# Patient Record
Sex: FEMALE | Race: WHITE | Employment: OTHER | URBAN - METROPOLITAN AREA
[De-identification: names, ages, dates, MRNs, and addresses within clinical notes are randomized per-mention and may not be internally consistent; named-entity substitution may affect disease eponyms.]

---

## 2017-01-03 ENCOUNTER — HOSPITAL ENCOUNTER (EMERGENCY)
Age: 62
Discharge: HOME | End: 2017-01-03
Attending: EMERGENCY MEDICINE
Payer: COMMERCIAL

## 2017-01-03 VITALS
SYSTOLIC BLOOD PRESSURE: 129 MMHG | WEIGHT: 253.75 LBS | RESPIRATION RATE: 16 BRPM | OXYGEN SATURATION: 100 % | DIASTOLIC BLOOD PRESSURE: 62 MMHG | HEIGHT: 66 IN | HEART RATE: 68 BPM | TEMPERATURE: 97.9 F | BODY MASS INDEX: 40.78 KG/M2

## 2017-01-03 DIAGNOSIS — M54.50 ACUTE BILATERAL LOW BACK PAIN WITHOUT SCIATICA: Primary | ICD-10-CM

## 2017-01-03 PROCEDURE — 99282 EMERGENCY DEPT VISIT SF MDM: CPT

## 2017-01-03 RX ORDER — CYCLOBENZAPRINE HCL 5 MG
10 TABLET ORAL
Qty: 15 TAB | Refills: 0 | Status: SHIPPED | OUTPATIENT
Start: 2017-01-03 | End: 2017-03-10 | Stop reason: ALTCHOICE

## 2017-01-03 RX ORDER — OXYCODONE AND ACETAMINOPHEN 5; 325 MG/1; MG/1
1 TABLET ORAL
Qty: 20 TAB | Refills: 0 | Status: SHIPPED | OUTPATIENT
Start: 2017-01-03 | End: 2017-03-10 | Stop reason: ALTCHOICE

## 2017-01-03 NOTE — ED PROVIDER NOTES
Patient is a 62 y.o. female presenting with back pain. The history is provided by the patient. Back Pain    This is a new problem. The current episode started yesterday. The problem has not changed since onset. Episode frequency: with movement only. Patient reports not work related injury. The pain is associated with no known injury (was walking and felt low back suddenly tighten up). The pain is present in the left side, right side and lower back. The quality of the pain is described as aching. The pain does not radiate. The pain is moderate. The symptoms are aggravated by bending, twisting and certain positions. Stiffness is present in the morning. Pertinent negatives include no chest pain, no fever, no numbness, no weight loss, no headaches, no abdominal pain, no abdominal swelling, no bowel incontinence, no perianal numbness, no bladder incontinence, no dysuria, no pelvic pain, no leg pain, no paresthesias, no paresis, no tingling and no weakness. Risk factors include obesity. The patient's surgical history non-contributory        Past Medical History:   Diagnosis Date    Depression     Diabetes ([facility] [address]     Gastrointestinal disorder      gerd    High cholesterol     Obese     Other ill-defined conditions(799.89)      fibromyalgia    Psychiatric disorder      depression       Past Surgical History:   Procedure Laterality Date    Hx gyn       hysterectomy    Delivery        X 2    Hx septoplasty           Family History:   Problem Relation Age of Onset    Hypertension Mother     Heart Disease Father     Cancer Brother        Social History     Social History    Marital status:      Spouse name: N/A    Number of children: N/A    Years of education: N/A     Occupational History    Not on file.      Social History Main Topics    Smoking status: Former Smoker    Smokeless tobacco: Never Used    Alcohol use No      Comment: rarely     Drug use: No    Sexual activity: Not Currently Partners: Male     Birth control/ protection: None     Other Topics Concern    Not on file     Social History Narrative         ALLERGIES: No allergy information available and No known drug allergies    Review of Systems   Constitutional: Negative. Negative for activity change, appetite change, chills, fatigue, fever, unexpected weight change and weight loss. HENT: Negative. Negative for congestion, hearing loss, rhinorrhea, sneezing and voice change. Eyes: Negative. Negative for pain and visual disturbance. Respiratory: Negative. Negative for apnea, cough, choking, chest tightness and shortness of breath. Cardiovascular: Negative. Negative for chest pain and palpitations. Gastrointestinal: Negative. Negative for abdominal distention, abdominal pain, blood in stool, bowel incontinence, diarrhea, nausea and vomiting. Genitourinary: Negative. Negative for bladder incontinence, difficulty urinating, dysuria, flank pain, frequency, pelvic pain and urgency. No discharge   Musculoskeletal: Positive for back pain. Negative for arthralgias, myalgias and neck stiffness. Skin: Negative. Negative for color change and rash. Neurological: Negative. Negative for dizziness, tingling, seizures, syncope, speech difficulty, weakness, numbness, headaches and paresthesias. Hematological: Negative for adenopathy. Psychiatric/Behavioral: Negative. Negative for agitation, behavioral problems, dysphoric mood and suicidal ideas. The patient is not nervous/anxious. Vitals:    01/03/17 1434   BP: 129/62   Pulse: 68   Resp: 16   Temp: 97.9 °F (36.6 °C)   SpO2: 100%   Weight: 115.1 kg (253 lb 12 oz)   Height: 5' 6\" (1.676 m)            Physical Exam   Constitutional: She is oriented to person, place, and time. She appears well-developed and well-nourished. No distress. HENT:   Head: Normocephalic and atraumatic. Mouth/Throat: Oropharynx is clear and moist. No oropharyngeal exudate.    Eyes: Conjunctivae and EOM are normal. Pupils are equal, round, and reactive to light. Right eye exhibits no discharge. Left eye exhibits no discharge. Neck: Normal range of motion. Neck supple. Cardiovascular: Normal rate, regular rhythm and intact distal pulses. Exam reveals no gallop and no friction rub. No murmur heard. Pulmonary/Chest: Effort normal and breath sounds normal. No respiratory distress. She has no wheezes. She has no rales. She exhibits no tenderness. Abdominal: Soft. Bowel sounds are normal. She exhibits no distension and no mass. There is no tenderness. There is no rebound and no guarding. Musculoskeletal: Normal range of motion. She exhibits tenderness. She exhibits no edema. Bilateral paraspinal muscular tenderness to palpation adjacent to lumbar spine. No swelling or erythema. N/v intact distally.  - SLR bilaterally. Lymphadenopathy:     She has no cervical adenopathy. Neurological: She is alert and oriented to person, place, and time. No cranial nerve deficit. Coordination normal.   Skin: Skin is warm and dry. No rash noted. No erythema. Psychiatric: She has a normal mood and affect. Nursing note and vitals reviewed. MDM  Number of Diagnoses or Management Options  Diagnosis management comments: Acute muscular low back pain. No indication for xrays. Will treat with conservative measures. Nsaids, muscle relaxants, heat, stretching, massage. F/u pcp in 1 week for recheck.     Risk of Complications, Morbidity, and/or Mortality  Presenting problems: low  Diagnostic procedures: low  Management options: low    Patient Progress  Patient progress: stable    ED Course       Procedures

## 2017-01-03 NOTE — ED TRIAGE NOTES
Pt ambulatory to treatment area with c/o \"lower back pain x 3 days. \"  Pt states \"sunday morning I got out of the chair and I bent over in pain. \"  Pt denies urinary symptoms, no loss of bowel or bladder. Pt states \"it feels like a spasm. \"  Pt reports taking vicodin yesterday without relief.

## 2017-01-03 NOTE — DISCHARGE INSTRUCTIONS
Back Pain: Care Instructions  Your Care Instructions    Back pain has many possible causes. It is often related to problems with muscles and ligaments of the back. It may also be related to problems with the nerves, discs, or bones of the back. Moving, lifting, standing, sitting, or sleeping in an awkward way can strain the back. Sometimes you don't notice the injury until later. Arthritis is another common cause of back pain. Although it may hurt a lot, back pain usually improves on its own within several weeks. Most people recover in 12 weeks or less. Using good home treatment and being careful not to stress your back can help you feel better sooner. Follow-up care is a key part of your treatment and safety. Be sure to make and go to all appointments, and call your doctor if you are having problems. Its also a good idea to know your test results and keep a list of the medicines you take. How can you care for yourself at home? · Sit or lie in positions that are most comfortable and reduce your pain. Try one of these positions when you lie down:  ¨ Lie on your back with your knees bent and supported by large pillows. ¨ Lie on the floor with your legs on the seat of a sofa or chair. Gregorio Left on your side with your knees and hips bent and a pillow between your legs. ¨ Lie on your stomach if it does not make pain worse. · Do not sit up in bed, and avoid soft couches and twisted positions. Bed rest can help relieve pain at first, but it delays healing. Avoid bed rest after the first day of back pain. · Change positions every 30 minutes. If you must sit for long periods of time, take breaks from sitting. Get up and walk around, or lie in a comfortable position. · Try using a heating pad on a low or medium setting for 15 to 20 minutes every 2 or 3 hours. Try a warm shower in place of one session with the heating pad. · You can also try an ice pack for 10 to 15 minutes every 2 to 3 hours.  Put a thin cloth between the ice pack and your skin. · Take pain medicines exactly as directed. ¨ If the doctor gave you a prescription medicine for pain, take it as prescribed. ¨ If you are not taking a prescription pain medicine, ask your doctor if you can take an over-the-counter medicine. · Take short walks several times a day. You can start with 5 to 10 minutes, 3 or 4 times a day, and work up to longer walks. Walk on level surfaces and avoid hills and stairs until your back is better. · Return to work and other activities as soon as you can. Continued rest without activity is usually not good for your back. · To prevent future back pain, do exercises to stretch and strengthen your back and stomach. Learn how to use good posture, safe lifting techniques, and proper body mechanics. When should you call for help? Call your doctor now or seek immediate medical care if:  · You have new or worsening numbness in your legs. · You have new or worsening weakness in your legs. (This could make it hard to stand up.)  · You lose control of your bladder or bowels. Watch closely for changes in your health, and be sure to contact your doctor if:  · Your pain gets worse. · You are not getting better after 2 weeks. Where can you learn more? Go to http://kelli-carl.info/. Enter V781 in the search box to learn more about \"Back Pain: Care Instructions. \"  Current as of: May 23, 2016  Content Version: 11.1  © 4450-9388 [employer]. Care instructions adapted under license by [employer] (which disclaims liability or warranty for this information). If you have questions about a medical condition or this instruction, always ask your healthcare professional. [address] any warranty or liability for your use of this information.

## 2017-03-10 ENCOUNTER — OFFICE VISIT (OUTPATIENT)
Dept: FAMILY MEDICINE CLINIC | Age: 62
End: 2017-03-10

## 2017-03-10 VITALS
BODY MASS INDEX: 40.43 KG/M2 | HEART RATE: 74 BPM | DIASTOLIC BLOOD PRESSURE: 71 MMHG | RESPIRATION RATE: 20 BRPM | SYSTOLIC BLOOD PRESSURE: 140 MMHG | WEIGHT: 251.6 LBS | OXYGEN SATURATION: 98 % | HEIGHT: 66 IN | TEMPERATURE: 98.3 F

## 2017-03-10 DIAGNOSIS — J04.0 LARYNGITIS: ICD-10-CM

## 2017-03-10 DIAGNOSIS — J32.9 SINUSITIS, UNSPECIFIED CHRONICITY, UNSPECIFIED LOCATION: ICD-10-CM

## 2017-03-10 DIAGNOSIS — J40 BRONCHITIS: Primary | ICD-10-CM

## 2017-03-10 RX ORDER — AMOXICILLIN AND CLAVULANATE POTASSIUM 875; 125 MG/1; MG/1
1 TABLET, FILM COATED ORAL 2 TIMES DAILY
Qty: 20 TAB | Refills: 0 | Status: SHIPPED | OUTPATIENT
Start: 2017-03-10 | End: 2017-03-20

## 2017-03-10 RX ORDER — LISDEXAMFETAMINE DIMESYLATE 60 MG/1
CAPSULE ORAL
Refills: 0 | COMMUNITY
Start: 2017-03-07 | End: 2018-06-28

## 2017-03-10 RX ORDER — LAMOTRIGINE 25 MG/1
TABLET ORAL
Refills: 0 | COMMUNITY
Start: 2017-03-06 | End: 2017-04-12

## 2017-03-10 NOTE — PROGRESS NOTES
Identified pt with two pt identifiers(name and ). Chief Complaint   Patient presents with    Sore Throat     only yesterday    Cough     coughing up green mucus        Health Maintenance Due   Topic    EYE EXAM RETINAL OR DILATED Q1     ZOSTER VACCINE AGE 60>        Wt Readings from Last 3 Encounters:   03/10/17 251 lb 9.6 oz (114.1 kg)   17 253 lb 12 oz (115.1 kg)   16 257 lb 15 oz (117 kg)     Temp Readings from Last 3 Encounters:   03/10/17 98.3 °F (36.8 °C) (Oral)   17 97.9 °F (36.6 °C)   16 98.1 °F (36.7 °C)     BP Readings from Last 3 Encounters:   03/10/17 140/71   17 129/62   16 (!) 140/115     Pulse Readings from Last 3 Encounters:   03/10/17 74   17 68   16 70         Learning Assessment:  :     Learning Assessment 2014   PRIMARY LEARNER Patient   HIGHEST LEVEL OF EDUCATION - PRIMARY LEARNER  DID NOT GRADUATE HIGH SCHOOL   BARRIERS PRIMARY LEARNER NONE   CO-LEARNER CAREGIVER No   PRIMARY LANGUAGE ENGLISH   LEARNER PREFERENCE PRIMARY OTHER (COMMENT)   ANSWERED BY patient   RELATIONSHIP SELF       Depression Screening:  :     PHQ 2 / 9, over the last two weeks 2016   Little interest or pleasure in doing things Several days   Feeling down, depressed or hopeless Several days   Total Score PHQ 2 2       Fall Risk Assessment:  :     Fall Risk Assessment, last 12 mths 2016   Able to walk? Yes   Fall in past 12 months? No       Abuse Screening:  :     Abuse Screening Questionnaire 10/10/2016 2015   Do you ever feel afraid of your partner? N N   Are you in a relationship with someone who physically or mentally threatens you? N N   Is it safe for you to go home? Y Y       Coordination of Care Questionnaire:  :     1) Have you been to an emergency room, urgent care clinic since your last visit?  yes  16 rib pain and 1/3/17 Anibal Liveang for her back   Hospitalized since your last visit? no             2) Have you seen or consulted any other health care providers outside of [address] [place] [address] since your last visit? yes  Dr Vince Arora psy (Include any pap smears or colon screenings in this section.)    3) Do you have an Advance Directive on file? no  Are you interested in receiving information about Advance Directives? no    Reviewed record in preparation for visit and have obtained necessary documentation. Medication reconciliation up to date and corrected with patient at this time.

## 2017-03-10 NOTE — PATIENT INSTRUCTIONS
Laryngitis: Care Instructions  Your Care Instructions    Laryngitis is an inflammation of the voice box (larynx) that causes your voice to become raspy or hoarse. It can be short-lived or long-lasting. Most of the time, laryngitis comes on quickly and lasts as long as 2 weeks. It is caused by overuse, irritation, or infection of the vocal cords inside the larynx. Some of the most common causes are a cold, the flu, or allergies. Loud talking, shouting, cheering, or singing also can cause laryngitis. Stomach acid that backs up into the throat also can make you lose your voice. Resting your voice and taking other steps at home can help you get your voice back. Follow-up care is a key part of your treatment and safety. Be sure to make and go to all appointments, and call your doctor if you are having problems. It's also a good idea to know your test results and keep a list of the medicines you take. How can you care for yourself at home? · Follow your doctor's directions for treating the condition that caused you to lose your voice. If your doctor prescribed antibiotics, take them as directed. Do not stop taking them just because you feel better. You need to take the full course of antibiotics. · Before you use cough and cold medicines, check the label. They may not be safe for young children or for people with certain health problems. · Try to keep stomach acid from backing up into your throat. Do not eat just before bedtime. Reduce the amount of coffee and alcohol you drink, and eat healthy foods. Taking over-the-counter acid reducers can help when these steps are not enough. In some cases, you may need prescription medicine. · Rest your voice. You do not have to stop speaking, but use your voice as little as possible. Speak softly but do not whisper; whispering can bother your larynx more than speaking softly. Avoid talking on the telephone or trying to speak loudly. · Try not to clear your throat.  This can cause more irritation of your larynx. Take an over-the-counter cough suppressant (if your doctor recommends it) if you have a dry cough that does not produce mucus. · Do not smoke or allow others to smoke around you. If you need help quitting, talk to your doctor about stop-smoking programs and medicines. These can increase your chances of quitting for good. · Use a humidifier or vaporizer to add moisture to your bedroom. Humidity helps to thin the mucus in the nasal membranes that causes stuffiness or postnasal drip. Follow the directions for cleaning the machine. · Drink plenty of fluids, enough so that your urine is light yellow or clear like water. If you have kidney, heart, or liver disease and have to limit fluids, talk with your doctor before you increase the amount of fluids you drink. · Relieve nasal stuffiness. A saline (saltwater) nasal wash may help. You can buy saline nose drops at a grocery store or drugstore. Or you can make your own by adding 1 teaspoon of salt and 1 teaspoon of baking soda to 2 cups of distilled water. If you make your own, fill a bulb syringe with the solution, insert the tip into your nostril, and squeeze gently. Lorn Hush your nose. When should you call for help? Call your doctor now or seek immediate medical care if:  · You have new or worse trouble breathing. · You have new pain, or your pain gets worse. · You have trouble swallowing. Watch closely for changes in your health, and be sure to contact your doctor if:  · Your voice does not get better after 2 weeks of care at home. Where can you learn more? Go to http://kelli-carl.info/. Enter Z272 in the search box to learn more about \"Laryngitis: Care Instructions. \"  Current as of: July 29, 2016  Content Version: 11.1  © 8210-1701 [employer]. Care instructions adapted under license by [employer] (which disclaims liability or warranty for this information).  If you have questions about a medical condition or this instruction, always ask your healthcare professional. [place] [address] any warranty or liability for your use of this information.

## 2017-03-10 NOTE — MR AVS SNAPSHOT
Visit Information Date & Time Provider Department Dept. Phone Encounter #  
 [id number]  7:35 PM Jaya Ortiz [phone] Upcoming Health Maintenance Date Due  
 EYE EXAM RETINAL OR DILATED Q1 8/17/1966 ZOSTER VACCINE AGE 60> 8/17/2016 Pneumococcal 19-64 Medium Risk (1 of 1 - PPSV23) 8/23/2021* HEMOGLOBIN A1C Q6M 4/25/2017 FOOT EXAM Q1 5/25/2017 MICROALBUMIN Q1 5/25/2017 LIPID PANEL Q1 10/25/2017 BREAST CANCER SCRN MAMMOGRAM 12/30/2017 PAP AKA CERVICAL CYTOLOGY 11/19/2018 COLONOSCOPY 1/21/2019 DTaP/Tdap/Td series (2 - Td) 9/1/2023 *Topic was postponed. The date shown is not the original due date. Allergies as of 3/10/2017  Review Complete On: 0/27/0104 By: Je Clark MD  
  
 Severity Noted Reaction Type Reactions No Allergy Information Available  09/04/2011    Other (comments)  
 unresponsive No Known Drug Allergies  09/04/2011    Unknown (comments) NKDA per pt's  Current Immunizations  Reviewed on 10/10/2016 Name Date Influenza Vaccine (Quad) 11/19/2015 11:47 AM  
 Influenza Vaccine (Quad) PF 10/10/2016, 1/28/2015 Tdap 9/1/2013  6:31 PM  
  
 Not reviewed this visit You Were Diagnosed With   
  
 Codes Comments Bronchitis    -  Primary ICD-10-CM: X44 ICD-9-CM: 524 Sinusitis, unspecified chronicity, unspecified location     ICD-10-CM: J32.9 ICD-9-CM: 473.9 Laryngitis     ICD-10-CM: J04.0 ICD-9-CM: 464.00 Vitals BP Pulse Temp Resp Height(growth percentile) Weight(growth percentile) 140/71 (BP 1 Location: Right arm, BP Patient Position: Sitting) 74 98.3 °F (36.8 °C) (Oral) 20 5' 6\" (1.676 m) 251 lb 9.6 oz (114.1 kg) SpO2 BMI OB Status Smoking Status 98% 40.61 kg/m2 Hysterectomy Former Smoker BMI and BSA Data Body Mass Index Body Surface Area  
 40.61 kg/m 2 2.31 m 2 Preferred Pharmacy Pharmacy Name Phone [facility] #[id number] - [place] [facility] [address] [phone] Your Updated Medication List  
  
   
This list is accurate as of: 3/10/17  3:09 PM.  Always use your most recent med list. amLODIPine 5 mg tablet Commonly known as:  Suzon Salle TAKE 1 TABLET BY MOUTH DAILY FOR PRINZMETAL ANGINA  
  
 amoxicillin-clavulanate 875-125 mg per tablet Commonly known as:  AUGMENTIN Take 1 Tab by mouth two (2) times a day for 10 days. doxepin 25 mg capsule Commonly known as:  SINEquan TAKE 1 CAPSULE BY MOUTH AT BEDTIME  
  
 escitalopram oxalate 20 mg tablet Commonly known as:  Lorenzo Dub TAKE 2 TABLETS BY MOUTH DAILY  
  
 lactulose 10 gram/15 mL (15 mL) Soln 15 mL twice a day. Indications: Constipation  
  
 lamoTRIgine 25 mg tablet Commonly known as: LaMICtal  
TAKE 2 TABLETS BY MOUTH EVERY NIGHT  
  
 levothyroxine 175 mcg tablet Commonly known as:  SYNTHROID Take 1 Tab by mouth Daily (before breakfast). metFORMIN 500 mg tablet Commonly known as:  GLUCOPHAGE  
TAKE 1 TABLET BY MOUTH TWICE DAILY WITH MEALS  
  
 omeprazole 20 mg capsule Commonly known as:  PRILOSEC  
TAKE 1 CAPSULE BY MOUTH TWICE DAILY pravastatin 40 mg tablet Commonly known as:  PRAVACHOL  
TAKE 1 TABLET BY MOUTH EVERY NIGHT  
  
 pregabalin 150 mg capsule Commonly known as:  Deneise Grandchild TAKE ONE CAPSULE BY MOUTH TWICE A DAY  Indications: FIBROMYALGIA  
  
 VYVANSE 60 mg capsule Generic drug:  Lisdexamfetamine TAKE ONE CAPSULE BY MOUTH EVERY DAY Prescriptions Sent to Pharmacy Refills  
 amoxicillin-clavulanate (AUGMENTIN) 875-125 mg per tablet 0 Sig: Take 1 Tab by mouth two (2) times a day for 10 days. Class: Normal  
 Pharmacy: [facility]acy #[id number] - [place] - [address] Ph #: [phone] Route: Oral  
  
Patient Instructions Laryngitis: Care Instructions Your Care Instructions Laryngitis is an inflammation of the voice box (larynx) that causes your voice to become raspy or hoarse. It can be short-lived or long-lasting. Most of the time, laryngitis comes on quickly and lasts as long as 2 weeks. It is caused by overuse, irritation, or infection of the vocal cords inside the larynx. Some of the most common causes are a cold, the flu, or allergies. Loud talking, shouting, cheering, or singing also can cause laryngitis. Stomach acid that backs up into the throat also can make you lose your voice. Resting your voice and taking other steps at home can help you get your voice back. Follow-up care is a key part of your treatment and safety. Be sure to make and go to all appointments, and call your doctor if you are having problems. It's also a good idea to know your test results and keep a list of the medicines you take. How can you care for yourself at home? · Follow your doctor's directions for treating the condition that caused you to lose your voice. If your doctor prescribed antibiotics, take them as directed. Do not stop taking them just because you feel better. You need to take the full course of antibiotics. · Before you use cough and cold medicines, check the label. They may not be safe for young children or for people with certain health problems. · Try to keep stomach acid from backing up into your throat. Do not eat just before bedtime. Reduce the amount of coffee and alcohol you drink, and eat healthy foods. Taking over-the-counter acid reducers can help when these steps are not enough. In some cases, you may need prescription medicine. · Rest your voice. You do not have to stop speaking, but use your voice as little as possible. Speak softly but do not whisper; whispering can bother your larynx more than speaking softly. Avoid talking on the telephone or trying to speak loudly. · Try not to clear your throat.  This can cause more irritation of your larynx. Take an over-the-counter cough suppressant (if your doctor recommends it) if you have a dry cough that does not produce mucus. · Do not smoke or allow others to smoke around you. If you need help quitting, talk to your doctor about stop-smoking programs and medicines. These can increase your chances of quitting for good. · Use a humidifier or vaporizer to add moisture to your bedroom. Humidity helps to thin the mucus in the nasal membranes that causes stuffiness or postnasal drip. Follow the directions for cleaning the machine. · Drink plenty of fluids, enough so that your urine is light yellow or clear like water. If you have kidney, heart, or liver disease and have to limit fluids, talk with your doctor before you increase the amount of fluids you drink. · Relieve nasal stuffiness. A saline (saltwater) nasal wash may help. You can buy saline nose drops at a grocery store or drugstore. Or you can make your own by adding 1 teaspoon of salt and 1 teaspoon of baking soda to 2 cups of distilled water. If you make your own, fill a bulb syringe with the solution, insert the tip into your nostril, and squeeze gently. Avila Bees your nose. When should you call for help? Call your doctor now or seek immediate medical care if: 
· You have new or worse trouble breathing. · You have new pain, or your pain gets worse. · You have trouble swallowing. Watch closely for changes in your health, and be sure to contact your doctor if: 
· Your voice does not get better after 2 weeks of care at home. Where can you learn more? Go to http://kelli-carl.info/. Enter C932 in the search box to learn more about \"Laryngitis: Care Instructions. \" Current as of: July 29, 2016 Content Version: 11.1 © 3423-4538 [employer]. Care instructions adapted under license by [employer] (which disclaims liability or warranty for this information).  If you have questions about a medical condition or this instruction, always ask your healthcare professional. [place] [address] any warranty or liability for your use of this information. Introducing [facility]! [employer] introduces Zillabyte patient portal. Now you can access parts of your medical record, email your doctor's office, and request medication refills online. 1. In your internet browser, go to [email or web address] 2. Click on the First Time User? Click Here link in the Sign In box. You will see the New Member Sign Up page. 3. Enter your Zillabyte Access Code exactly as it appears below. You will not need to use this code after youve completed the sign-up process. If you do not sign up before the expiration date, you must request a new code. · Zillabyte Access Code: [email or web address] Expires: 6/8/2017  3:09 PM 
 
4. Enter the last four digits of your Social Security Number (xxxx) and Date of Birth (mm/dd/yyyy) as indicated and click Submit. You will be taken to the next sign-up page. 5. Create a Zillabyte ID. This will be your Zillabyte login ID and cannot be changed, so think of one that is secure and easy to remember. 6. Create a Zillabyte password. You can change your password at any time. 7. Enter your Password Reset Question and Answer. This can be used at a later time if you forget your password. 8. Enter your e-mail address. You will receive e-mail notification when new information is available in [address] 9. Click Sign Up. You can now view and download portions of your medical record. 10. Click the Download Summary menu link to download a portable copy of your medical information. If you have questions, please visit the Frequently Asked Questions section of the Zillabyte website. Remember, Zillabyte is NOT to be used for urgent needs. For medical emergencies, dial 911. Now available from your iPhone and Android! Please provide this summary of care documentation to your next provider. Your primary care clinician is listed as Kirk Powell. If you have any questions after today's visit, please call [phone].

## 2017-03-10 NOTE — PROGRESS NOTES
Subjective:   Salazar So is a 62 y.o. female who complains of congestion, sore throat, productive cough and hoarseness for 2 days, gradually worsening since that time. She denies a history of shortness of breath and wheezing. Evaluation to date: none. Treatment to date: OTC products. Patient does not smoke cigarettes. Relevant PMH: . Patient Active Problem List    Diagnosis Date Noted    S/P TOMASA (total abdominal hysterectomy) 04/19/2016    Type II diabetes mellitus ([facility] [address] 04/19/2016    Essential hypertension 03/23/2016    Hypercholesterolemia 03/23/2016    ANNA on CPAP 07/01/2014    MOFFETT (nonalcoholic steatohepatitis) 06/28/2014    Lung nodule seen on imaging study 06/28/2014    Obesity 09/05/2011    Hypothyroidism 09/04/2011    Depression 09/04/2011    GERD 09/04/2011    Fibromyalgia 09/04/2011     Current Outpatient Prescriptions   Medication Sig Dispense Refill    lamoTRIgine (LAMICTAL) 25 mg tablet TAKE 2 TABLETS BY MOUTH EVERY NIGHT  0    VYVANSE 60 mg capsule TAKE ONE CAPSULE BY MOUTH EVERY DAY  0    amoxicillin-clavulanate (AUGMENTIN) 875-125 mg per tablet Take 1 Tab by mouth two (2) times a day for 10 days. 20 Tab 0    doxepin (SINEQUAN) 25 mg capsule TAKE 1 CAPSULE BY MOUTH AT BEDTIME 90 Cap 3    escitalopram oxalate (LEXAPRO) 20 mg tablet TAKE 2 TABLETS BY MOUTH DAILY 180 Tab 3    levothyroxine (SYNTHROID) 175 mcg tablet Take 1 Tab by mouth Daily (before breakfast).  90 Tab 1    pregabalin (LYRICA) 150 mg capsule TAKE ONE CAPSULE BY MOUTH TWICE A DAY  Indications: FIBROMYALGIA 180 Cap 1    amLODIPine (NORVASC) 5 mg tablet TAKE 1 TABLET BY MOUTH DAILY FOR PRINZMETAL ANGINA 90 Tab 0    pravastatin (PRAVACHOL) 40 mg tablet TAKE 1 TABLET BY MOUTH EVERY NIGHT 90 Tab 0    omeprazole (PRILOSEC) 20 mg capsule TAKE 1 CAPSULE BY MOUTH TWICE DAILY 180 Cap 0    metFORMIN (GLUCOPHAGE) 500 mg tablet TAKE 1 TABLET BY MOUTH TWICE DAILY WITH MEALS 180 Tab 0    lactulose 10 gram/15 mL (15 mL) soln 15 mL twice a day. Indications: Constipation 200 mL 2     Allergies   Allergen Reactions    No Allergy Information Available Other (comments)     unresponsive    No Known Drug Allergies Unknown (comments)     NKDA per pt's      Past Medical History:   Diagnosis Date    Depression     Diabetes ([address]     Gastrointestinal disorder     gerd    High cholesterol     Obese     Other ill-defined conditions(799.89)     fibromyalgia    Psychiatric disorder     depression     Past Surgical History:   Procedure Laterality Date    DELIVERY       X 2    HX GYN      hysterectomy    HX SEPTOPLASTY       Family History   Problem Relation Age of Onset    Hypertension Mother     Heart Disease Father     Cancer Brother      Social History   Substance Use Topics    Smoking status: Former Smoker    Smokeless tobacco: Never Used    Alcohol use No      Comment: rarely         Review of Systems  Pertinent items are noted in HPI. Objective:     Visit Vitals    /71 (BP 1 Location: Right arm, BP Patient Position: Sitting)    Pulse 74    Temp 98.3 °F (36.8 °C) (Oral)    Resp 20    Ht 5' 6\" (1.676 m)    Wt 251 lb 9.6 oz (114.1 kg)    SpO2 98%    BMI 40.61 kg/m2     General:  alert, cooperative, no distress, moderately obese   Eyes: negative   Ears: normal TM's and external ear canals AU   Sinuses: Normal paranasal sinuses without tenderness   Mouth:  Lips, mucosa, and tongue normal. Teeth and gums normal   Neck: supple, symmetrical, trachea midline and no adenopathy. Heart: S1 and S2 normal, no murmurs noted. Lungs: clear to auscultation bilaterally   Abdomen:         Assessment/Plan:         ICD-10-CM ICD-9-CM    1. Bronchitis J40 490 amoxicillin-clavulanate (AUGMENTIN) 875-125 mg per tablet   2. Sinusitis, unspecified chronicity, unspecified location J32.9 473.9 amoxicillin-clavulanate (AUGMENTIN) 875-125 mg per tablet   3. Laryngitis J04.0 464.00    .   Patient Instructions        Laryngitis: Care Instructions  Your Care Instructions    Laryngitis is an inflammation of the voice box (larynx) that causes your voice to become raspy or hoarse. It can be short-lived or long-lasting. Most of the time, laryngitis comes on quickly and lasts as long as 2 weeks. It is caused by overuse, irritation, or infection of the vocal cords inside the larynx. Some of the most common causes are a cold, the flu, or allergies. Loud talking, shouting, cheering, or singing also can cause laryngitis. Stomach acid that backs up into the throat also can make you lose your voice. Resting your voice and taking other steps at home can help you get your voice back. Follow-up care is a key part of your treatment and safety. Be sure to make and go to all appointments, and call your doctor if you are having problems. It's also a good idea to know your test results and keep a list of the medicines you take. How can you care for yourself at home? · Follow your doctor's directions for treating the condition that caused you to lose your voice. If your doctor prescribed antibiotics, take them as directed. Do not stop taking them just because you feel better. You need to take the full course of antibiotics. · Before you use cough and cold medicines, check the label. They may not be safe for young children or for people with certain health problems. · Try to keep stomach acid from backing up into your throat. Do not eat just before bedtime. Reduce the amount of coffee and alcohol you drink, and eat healthy foods. Taking over-the-counter acid reducers can help when these steps are not enough. In some cases, you may need prescription medicine. · Rest your voice. You do not have to stop speaking, but use your voice as little as possible. Speak softly but do not whisper; whispering can bother your larynx more than speaking softly. Avoid talking on the telephone or trying to speak loudly.   · Try not to clear your throat. This can cause more irritation of your larynx. Take an over-the-counter cough suppressant (if your doctor recommends it) if you have a dry cough that does not produce mucus. · Do not smoke or allow others to smoke around you. If you need help quitting, talk to your doctor about stop-smoking programs and medicines. These can increase your chances of quitting for good. · Use a humidifier or vaporizer to add moisture to your bedroom. Humidity helps to thin the mucus in the nasal membranes that causes stuffiness or postnasal drip. Follow the directions for cleaning the machine. · Drink plenty of fluids, enough so that your urine is light yellow or clear like water. If you have kidney, heart, or liver disease and have to limit fluids, talk with your doctor before you increase the amount of fluids you drink. · Relieve nasal stuffiness. A saline (saltwater) nasal wash may help. You can buy saline nose drops at a grocery store or drugstore. Or you can make your own by adding 1 teaspoon of salt and 1 teaspoon of baking soda to 2 cups of distilled water. If you make your own, fill a bulb syringe with the solution, insert the tip into your nostril, and squeeze gently. Duana Glow your nose. When should you call for help? Call your doctor now or seek immediate medical care if:  · You have new or worse trouble breathing. · You have new pain, or your pain gets worse. · You have trouble swallowing. Watch closely for changes in your health, and be sure to contact your doctor if:  · Your voice does not get better after 2 weeks of care at home. Where can you learn more? Go to http://kelli-carl.info/. Enter Z722 in the search box to learn more about \"Laryngitis: Care Instructions. \"  Current as of: July 29, 2016  Content Version: 11.1  © 7545-3280 [employer].  Care instructions adapted under license by [employer] (which disclaims liability or warranty for this information). If you have questions about a medical condition or this instruction, always ask your healthcare professional. [place] [address] any warranty or liability for your use of this information. Home rest.  Given note to remain off work over weekend.

## 2017-03-10 NOTE — LETTER
NOTIFICATION RETURN TO WORK / SCHOOL 
 
3/10/2017 3:04 PM 
 
Ms. Mirian Mcdonald [address] [place], [place] [phone] To Whom It May Concern: 
 
Mirian Mcdonald is currently under the care of [address] [place] [address]. She will return to work on: 3/14/2017. If there are questions or concerns please have the patient contact our office. Sincerely, Fan Sung MD

## 2017-04-12 ENCOUNTER — OFFICE VISIT (OUTPATIENT)
Dept: FAMILY MEDICINE CLINIC | Age: 62
End: 2017-04-12

## 2017-04-12 VITALS
RESPIRATION RATE: 18 BRPM | BODY MASS INDEX: 41.33 KG/M2 | WEIGHT: 257.2 LBS | DIASTOLIC BLOOD PRESSURE: 75 MMHG | SYSTOLIC BLOOD PRESSURE: 130 MMHG | OXYGEN SATURATION: 98 % | HEIGHT: 66 IN | TEMPERATURE: 98.5 F | HEART RATE: 80 BPM

## 2017-04-12 DIAGNOSIS — J01.10 ACUTE NON-RECURRENT FRONTAL SINUSITIS: Primary | ICD-10-CM

## 2017-04-12 DIAGNOSIS — J02.9 SORE THROAT: ICD-10-CM

## 2017-04-12 LAB
S PYO AG THROAT QL: NEGATIVE
VALID INTERNAL CONTROL?: YES

## 2017-04-12 RX ORDER — CYCLOBENZAPRINE HCL 5 MG
TABLET ORAL
Refills: 0 | COMMUNITY
Start: 2017-01-03 | End: 2017-07-03 | Stop reason: ALTCHOICE

## 2017-04-12 RX ORDER — LAMOTRIGINE 100 MG/1
100 TABLET ORAL DAILY
COMMUNITY
Start: 2017-04-06 | End: 2017-07-10 | Stop reason: SDUPTHER

## 2017-04-12 RX ORDER — OXYCODONE AND ACETAMINOPHEN 5; 325 MG/1; MG/1
TABLET ORAL
Refills: 0 | COMMUNITY
Start: 2017-01-03 | End: 2017-04-12 | Stop reason: ALTCHOICE

## 2017-04-12 RX ORDER — AMOXICILLIN AND CLAVULANATE POTASSIUM 875; 125 MG/1; MG/1
1 TABLET, FILM COATED ORAL EVERY 12 HOURS
Qty: 14 TAB | Refills: 0 | Status: SHIPPED | OUTPATIENT
Start: 2017-04-12 | End: 2017-04-19

## 2017-04-12 NOTE — PATIENT INSTRUCTIONS
Sinusitis: Care Instructions  Your Care Instructions    Sinusitis is an infection of the lining of the sinus cavities in your head. Sinusitis often follows a cold. It causes pain and pressure in your head and face. In most cases, sinusitis gets better on its own in 1 to 2 weeks. But some mild symptoms may last for several weeks. Sometimes antibiotics are needed. Follow-up care is a key part of your treatment and safety. Be sure to make and go to all appointments, and call your doctor if you are having problems. It's also a good idea to know your test results and keep a list of the medicines you take. How can you care for yourself at home? · Take an over-the-counter pain medicine, such as acetaminophen (Tylenol), ibuprofen (Advil, Motrin), or naproxen (Aleve). Read and follow all instructions on the label. · If the doctor prescribed antibiotics, take them as directed. Do not stop taking them just because you feel better. You need to take the full course of antibiotics. · Be careful when taking over-the-counter cold or flu medicines and Tylenol at the same time. Many of these medicines have acetaminophen, which is Tylenol. Read the labels to make sure that you are not taking more than the recommended dose. Too much acetaminophen (Tylenol) can be harmful. · Breathe warm, moist air from a steamy shower, a hot bath, or a sink filled with hot water. Avoid cold, dry air. Using a humidifier in your home may help. Follow the directions for cleaning the machine. · Use saline (saltwater) nasal washes to help keep your nasal passages open and wash out mucus and bacteria. You can buy saline nose drops at a grocery store or drugstore. Or you can make your own at home by adding 1 teaspoon of salt and 1 teaspoon of baking soda to 2 cups of distilled water. If you make your own, fill a bulb syringe with the solution, insert the tip into your nostril, and squeeze gently. [place] your nose.   · Put a hot, wet towel or a warm gel pack on your face 3 or 4 times a day for 5 to 10 minutes each time. · Try a decongestant nasal spray like oxymetazoline (Afrin). Do not use it for more than 3 days in a row. Using it for more than 3 days can make your congestion worse. When should you call for help? Call your doctor now or seek immediate medical care if:  · You have new or worse swelling or redness in your face or around your eyes. · You have a new or higher fever. Watch closely for changes in your health, and be sure to contact your doctor if:  · You have new or worse facial pain. · The mucus from your nose becomes thicker (like pus) or has new blood in it. · You are not getting better as expected. Where can you learn more? Go to http://kelli-carl.info/. Enter G959 in the search box to learn more about \"Sinusitis: Care Instructions. \"  Current as of: July 29, 2016  Content Version: 11.2  © 2709-9009 [employer]. Care instructions adapted under license by [employer] (which disclaims liability or warranty for this information). If you have questions about a medical condition or this instruction, always ask your healthcare professional. [place] [address] any warranty or liability for your use of this information. Sore Throat: Care Instructions  Your Care Instructions    Infection by bacteria or a virus causes most sore throats. Cigarette smoke, dry air, air pollution, allergies, and yelling can also cause a sore throat. Sore throats can be painful and annoying. Fortunately, most sore throats go away on their own. If you have a bacterial infection, your doctor may prescribe antibiotics. Follow-up care is a key part of your treatment and safety. Be sure to make and go to all appointments, and call your doctor if you are having problems. It's also a good idea to know your test results and keep a list of the medicines you take.   How can you care for yourself at home?  · If your doctor prescribed antibiotics, take them as directed. Do not stop taking them just because you feel better. You need to take the full course of antibiotics. · Gargle with warm salt water once an hour to help reduce swelling and relieve discomfort. Use 1 teaspoon of salt mixed in 1 cup of warm water. · Take an over-the-counter pain medicine, such as acetaminophen (Tylenol), ibuprofen (Advil, Motrin), or naproxen (Aleve). Read and follow all instructions on the label. · Be careful when taking over-the-counter cold or flu medicines and Tylenol at the same time. Many of these medicines have acetaminophen, which is Tylenol. Read the labels to make sure that you are not taking more than the recommended dose. Too much acetaminophen (Tylenol) can be harmful. · Drink plenty of fluids. Fluids may help soothe an irritated throat. Hot fluids, such as tea or soup, may help decrease throat pain. · Use over-the-counter throat lozenges to soothe pain. Regular cough drops or hard candy may also help. These should not be given to young children because of the risk of choking. · Do not smoke or allow others to smoke around you. If you need help quitting, talk to your doctor about stop-smoking programs and medicines. These can increase your chances of quitting for good. · Use a vaporizer or humidifier to add moisture to your bedroom. Follow the directions for cleaning the machine. When should you call for help? Call your doctor now or seek immediate medical care if:  · You have new or worse trouble swallowing. · Your sore throat gets much worse on one side. Watch closely for changes in your health, and be sure to contact your doctor if you do not get better as expected. Where can you learn more? Go to http://kelli-carl.info/. Enter 062 441 80 [id number] in the search box to learn more about \"Sore Throat: Care Instructions. \"  Current as of: July 29, 2016  Content Version: 11.2  © 5235-9821 [employer]. Care instructions adapted under license by [employer] (which disclaims liability or warranty for this information). If you have questions about a medical condition or this instruction, always ask your healthcare professional. [place] any warranty or liability for your use of this information.

## 2017-04-12 NOTE — MR AVS SNAPSHOT
Visit Information Date & Time Provider Department Dept. Phone Encounter #  
 4/12/2017  8:00 AM Yumiko GuerreroJaya [phone] [id number] Follow-up Instructions Return if symptoms worsen or fail to improve. Upcoming Health Maintenance Date Due  
 EYE EXAM RETINAL OR DILATED Q1 8/17/1966 ZOSTER VACCINE AGE 60> 8/17/2016 HEMOGLOBIN A1C Q6M 4/25/2017 Pneumococcal 19-64 Medium Risk (1 of 1 - PPSV23) 8/23/2021* FOOT EXAM Q1 5/25/2017 MICROALBUMIN Q1 5/25/2017 LIPID PANEL Q1 10/25/2017 BREAST CANCER SCRN MAMMOGRAM 12/30/2017 PAP AKA CERVICAL CYTOLOGY 11/19/2018 COLONOSCOPY 1/21/2019 DTaP/Tdap/Td series (2 - Td) 9/1/2023 *Topic was postponed. The date shown is not the original due date. Allergies as of 4/12/2017  Review Complete On: 4/12/2017 By: Yumiko Masters MD  
  
 Severity Noted Reaction Type Reactions No Allergy Information Available  09/04/2011    Other (comments)  
 unresponsive No Known Drug Allergies  09/04/2011    Unknown (comments) NKDA per pt's  Current Immunizations  Reviewed on 10/10/2016 Name Date Influenza Vaccine (Quad) 11/19/2015 11:47 AM  
 Influenza Vaccine (Quad) PF 10/10/2016, 1/28/2015 Tdap 9/1/2013  6:31 PM  
  
 Not reviewed this visit You Were Diagnosed With   
  
 Codes Comments Acute non-recurrent frontal sinusitis    -  Primary ICD-10-CM: J01.10 ICD-9-CM: 836.6 Sore throat     ICD-10-CM: J02.9 ICD-9-CM: 669 Vitals BP Pulse Temp Resp Height(growth percentile) Weight(growth percentile) 130/75 (BP 1 Location: Left arm, BP Patient Position: Sitting) 80 98.5 °F (36.9 °C) (Oral) 18 5' 6\" (1.676 m) 257 lb 3.2 oz (116.7 kg) SpO2 BMI OB Status Smoking Status 98% 41.51 kg/m2 Hysterectomy Former Smoker BMI and BSA Data Body Mass Index Body Surface Area 41.51 kg/m 2 2.33 m 2 Preferred Pharmacy Pharmacy Name Phone [facility]/PHARMACY #[id number] [place] - [address] [phone] Your Updated Medication List  
  
   
This list is accurate as of: 4/12/17  8:24 AM.  Always use your most recent med list. amLODIPine 5 mg tablet Commonly known as:  Erorl Pupa TAKE 1 TABLET BY MOUTH DAILY FOR PRINZMETAL ANGINA  
  
 amoxicillin-clavulanate 875-125 mg per tablet Commonly known as:  AUGMENTIN Take 1 Tab by mouth every twelve (12) hours for 7 days. cyclobenzaprine 5 mg tablet Commonly known as:  FLEXERIL  
TAKE 2 TABLETS BY MOUTH 3 TIMES A DAY AS NEEDED FOR MUSCLE SPASM  
  
 doxepin 25 mg capsule Commonly known as:  SINEquan TAKE 1 CAPSULE BY MOUTH AT BEDTIME  
  
 escitalopram oxalate 20 mg tablet Commonly known as:  Oletha Dubs TAKE 2 TABLETS BY MOUTH DAILY  
  
 lamoTRIgine 100 mg tablet Commonly known as: LaMICtal  
Take 100 mg by mouth daily. levothyroxine 175 mcg tablet Commonly known as:  SYNTHROID Take 1 Tab by mouth Daily (before breakfast). metFORMIN 500 mg tablet Commonly known as:  GLUCOPHAGE  
TAKE 1 TABLET BY MOUTH TWICE DAILY WITH MEALS  
  
 omeprazole 20 mg capsule Commonly known as:  PRILOSEC  
TAKE 1 CAPSULE BY MOUTH TWICE DAILY pravastatin 40 mg tablet Commonly known as:  PRAVACHOL  
TAKE 1 TABLET BY MOUTH EVERY NIGHT  
  
 pregabalin 150 mg capsule Commonly known as:  Veronica Alfonso TAKE ONE CAPSULE BY MOUTH TWICE A DAY  Indications: FIBROMYALGIA  
  
 VYVANSE 60 mg capsule Generic drug:  Lisdexamfetamine TAKE ONE CAPSULE BY MOUTH EVERY DAY Prescriptions Sent to Pharmacy Refills  
 amoxicillin-clavulanate (AUGMENTIN) 875-125 mg per tablet 0 Sig: Take 1 Tab by mouth every twelve (12) hours for 7 days. Class: Normal  
 Pharmacy: [facility]/pharmacy #[id number] - [place] - [address] Ph #: [phone] Route: Oral  
  
We Performed the Following AMB POC RAPID STREP A [15625 CPT(R)] Follow-up Instructions Return if symptoms worsen or fail to improve. Patient Instructions Sinusitis: Care Instructions Your Care Instructions Sinusitis is an infection of the lining of the sinus cavities in your head. Sinusitis often follows a cold. It causes pain and pressure in your head and face. In most cases, sinusitis gets better on its own in 1 to 2 weeks. But some mild symptoms may last for several weeks. Sometimes antibiotics are needed. Follow-up care is a key part of your treatment and safety. Be sure to make and go to all appointments, and call your doctor if you are having problems. It's also a good idea to know your test results and keep a list of the medicines you take. How can you care for yourself at home? · Take an over-the-counter pain medicine, such as acetaminophen (Tylenol), ibuprofen (Advil, Motrin), or naproxen (Aleve). Read and follow all instructions on the label. · If the doctor prescribed antibiotics, take them as directed. Do not stop taking them just because you feel better. You need to take the full course of antibiotics. · Be careful when taking over-the-counter cold or flu medicines and Tylenol at the same time. Many of these medicines have acetaminophen, which is Tylenol. Read the labels to make sure that you are not taking more than the recommended dose. Too much acetaminophen (Tylenol) can be harmful. · Breathe warm, moist air from a steamy shower, a hot bath, or a sink filled with hot water. Avoid cold, dry air. Using a humidifier in your home may help. Follow the directions for cleaning the machine. · Use saline (saltwater) nasal washes to help keep your nasal passages open and wash out mucus and bacteria. You can buy saline nose drops at a grocery store or drugstore.  Or you can make your own at home by adding 1 teaspoon of salt and 1 teaspoon of baking soda to 2 cups of distilled water. If you make your own, fill a bulb syringe with the solution, insert the tip into your nostril, and squeeze gently. [place] your nose. · Put a hot, wet towel or a warm gel pack on your face 3 or 4 times a day for 5 to 10 minutes each time. · Try a decongestant nasal spray like oxymetazoline (Afrin). Do not use it for more than 3 days in a row. Using it for more than 3 days can make your congestion worse. When should you call for help? Call your doctor now or seek immediate medical care if: 
· You have new or worse swelling or redness in your face or around your eyes. · You have a new or higher fever. Watch closely for changes in your health, and be sure to contact your doctor if: 
· You have new or worse facial pain. · The mucus from your nose becomes thicker (like pus) or has new blood in it. · You are not getting better as expected. Where can you learn more? Go to http://kelli-carl.info/. Enter C416 in the search box to learn more about \"Sinusitis: Care Instructions. \" Current as of: July 29, 2016 Content Version: 11.2 © 1820-3756 [employer]. Care instructions adapted under license by [employer] (which disclaims liability or warranty for this information). If you have questions about a medical condition or this instruction, always ask your healthcare professional. [place] [address] any warranty or liability for your use of this information. Sore Throat: Care Instructions Your Care Instructions Infection by bacteria or a virus causes most sore throats. Cigarette smoke, dry air, air pollution, allergies, and yelling can also cause a sore throat. Sore throats can be painful and annoying. Fortunately, most sore throats go away on their own. If you have a bacterial infection, your doctor may prescribe antibiotics. Follow-up care is a key part of your treatment and safety.  Be sure to make and go to all appointments, and call your doctor if you are having problems. It's also a good idea to know your test results and keep a list of the medicines you take. How can you care for yourself at home? · If your doctor prescribed antibiotics, take them as directed. Do not stop taking them just because you feel better. You need to take the full course of antibiotics. · Gargle with warm salt water once an hour to help reduce swelling and relieve discomfort. Use 1 teaspoon of salt mixed in 1 cup of warm water. · Take an over-the-counter pain medicine, such as acetaminophen (Tylenol), ibuprofen (Advil, Motrin), or naproxen (Aleve). Read and follow all instructions on the label. · Be careful when taking over-the-counter cold or flu medicines and Tylenol at the same time. Many of these medicines have acetaminophen, which is Tylenol. Read the labels to make sure that you are not taking more than the recommended dose. Too much acetaminophen (Tylenol) can be harmful. · Drink plenty of fluids. Fluids may help soothe an irritated throat. Hot fluids, such as tea or soup, may help decrease throat pain. · Use over-the-counter throat lozenges to soothe pain. Regular cough drops or hard candy may also help. These should not be given to young children because of the risk of choking. · Do not smoke or allow others to smoke around you. If you need help quitting, talk to your doctor about stop-smoking programs and medicines. These can increase your chances of quitting for good. · Use a vaporizer or humidifier to add moisture to your bedroom. Follow the directions for cleaning the machine. When should you call for help? Call your doctor now or seek immediate medical care if: 
· You have new or worse trouble swallowing. · Your sore throat gets much worse on one side. Watch closely for changes in your health, and be sure to contact your doctor if you do not get better as expected. Where can you learn more? Go to http://kelli-carl.info/. Enter [phone] [id number] [phone] in the search box to learn more about \"Sore Throat: Care Instructions. \" Current as of: July 29, 2016 Content Version: 11.2 © 3369-8700 [employer]. Care instructions adapted under license by [employer] (which disclaims liability or warranty for this information). If you have questions about a medical condition or this instruction, always ask your healthcare professional. [address] any warranty or liability for your use of this information. Introducing [facility]! Brenda Katiana introduces [employer] patient portal. Now you can access parts of your medical record, email your doctor's office, and request medication refills online. 1. In your internet browser, go to [email or web address]. [employer]/Medgenics 2. Click on the First Time User? Click Here link in the Sign In box. You will see the New Member Sign Up page. 3. Enter your [employer] Access Code exactly as it appears below. You will not need to use this code after youve completed the sign-up process. If you do not sign up before the expiration date, you must request a new code. · [employer] Access Code: [email or web address] Expires: 6/8/2017  4:09 PM 
 
4. Enter the last four digits of your Social Security Number (xxxx) and Date of Birth (mm/dd/yyyy) as indicated and click Submit. You will be taken to the next sign-up page. 5. Create a [employer] ID. This will be your [employer] login ID and cannot be changed, so think of one that is secure and easy to remember. 6. Create a [employer] password. You can change your password at any time. 7. Enter your Password Reset Question and Answer. This can be used at a later time if you forget your password. 8. Enter your e-mail address. You will receive e-mail notification when new information is available in [address] 9. Click Sign Up. You can now view and download portions of your medical record. 10. Click the Download Summary menu link to download a portable copy of your medical information. If you have questions, please visit the Frequently Asked Questions section of the homedeco2u website. Remember, homedeco2u is NOT to be used for urgent needs. For medical emergencies, dial 911. Now available from your iPhone and Android! Please provide this summary of care documentation to your next provider. Your primary care clinician is listed as Stephanie Hughes. If you have any questions after today's visit, please call [phone].

## 2017-04-12 NOTE — PROGRESS NOTES
Subjective:      Liberty Garcia is a 62 y.o. female here with complaint of sore throat, dry cough, headache, bilateral sinus pain and green nasal discharge for 2 days. She denies a history of chills, fevers, nausea, vomiting and wheezing. Positive sick contacts - granddaughter. Evaluation to date: none  Treatment to date: Dayquil with some improvement in her symptoms      Current Outpatient Prescriptions   Medication Sig Dispense Refill    cyclobenzaprine (FLEXERIL) 5 mg tablet TAKE 2 TABLETS BY MOUTH 3 TIMES A DAY AS NEEDED FOR MUSCLE SPASM  0    lamoTRIgine (LAMICTAL) 100 mg tablet Take 100 mg by mouth daily.  VYVANSE 60 mg capsule TAKE ONE CAPSULE BY MOUTH EVERY DAY  0    doxepin (SINEQUAN) 25 mg capsule TAKE 1 CAPSULE BY MOUTH AT BEDTIME 90 Cap 3    escitalopram oxalate (LEXAPRO) 20 mg tablet TAKE 2 TABLETS BY MOUTH DAILY 180 Tab 3    levothyroxine (SYNTHROID) 175 mcg tablet Take 1 Tab by mouth Daily (before breakfast).  90 Tab 1    pregabalin (LYRICA) 150 mg capsule TAKE ONE CAPSULE BY MOUTH TWICE A DAY  Indications: FIBROMYALGIA 180 Cap 1    amLODIPine (NORVASC) 5 mg tablet TAKE 1 TABLET BY MOUTH DAILY FOR PRINZMETAL ANGINA 90 Tab 0    pravastatin (PRAVACHOL) 40 mg tablet TAKE 1 TABLET BY MOUTH EVERY NIGHT 90 Tab 0    omeprazole (PRILOSEC) 20 mg capsule TAKE 1 CAPSULE BY MOUTH TWICE DAILY 180 Cap 0    metFORMIN (GLUCOPHAGE) 500 mg tablet TAKE 1 TABLET BY MOUTH TWICE DAILY WITH MEALS 180 Tab 0       Allergies   Allergen Reactions    No Allergy Information Available Other (comments)     unresponsive    No Known Drug Allergies Unknown (comments)     NKDA per pt's        Past Medical History:   Diagnosis Date    Depression     Diabetes (HCC)     Gastrointestinal disorder     gerd    High cholesterol     Obese     Other ill-defined conditions     fibromyalgia    Psychiatric disorder     depression       Social History   Substance Use Topics    Smoking status: Former Smoker    Smokeless tobacco: Never Used    Alcohol use No      Comment: rarely         Review of Systems  Pertinent items are noted in HPI. Objective:     Visit Vitals    /75 (BP 1 Location: Left arm, BP Patient Position: Sitting)    Pulse 80    Temp 98.5 °F (36.9 °C) (Oral)    Resp 18    Ht 5' 6\" (1.676 m)    Wt 257 lb 3.2 oz (116.7 kg)    SpO2 98%    BMI 41.51 kg/m2      General appearance - alert, well appearing, and in no distress  Eyes - pupils equal and reactive, extraocular eye movements intact, sclera anicteric  Ears - bilateral TM's and external ear canals normal  Nose - mucosal congestion, mucosal erythema and sinus tenderness noted frontal and maxillary sinuses  Oropharyngx - mucous membranes moist, pharynx normal without lesions and erythematous  Neck - supple, no significant adenopathy  Chest - clear to auscultation, no wheezes, rales or rhonchi, symmetric air entry, no tachypnea, retractions or cyanosis  Heart - normal rate, regular rhythm, normal S1, S2, no murmurs, rubs, clicks or gallops    Assessment/Plan:   Destinee Hernandez is a 62 y.o. female seen for:     1. Acute non-recurrent frontal sinusitis: will treat as below. - amoxicillin-clavulanate (AUGMENTIN) 875-125 mg per tablet; Take 1 Tab by mouth every twelve (12) hours for 7 days. Dispense: 14 Tab; Refill: 0  - Symptomatic therapy suggested: push fluids, rest, gargle warm salt water, nasal saline spray as needed for congestion, and use acetaminophen, ibuprofen, cough suppressant of choice prn.    2. Sore throat: rapid strep testing negative. - AMB POC RAPID STREP A    I have discussed the diagnosis with the patient and the intended plan as seen in the above orders. The patient has received an after-visit summary and questions were answered concerning future plans. I have discussed medication side effects and warnings with the patient as well.  Patient verbalizes understanding of plan of care and denies further questions or concerns at this time. Informed patient to return to the office if symptoms worsen or if new symptoms arise. Follow-up Disposition:  Return if symptoms worsen or fail to improve.

## 2017-04-12 NOTE — PROGRESS NOTES
Chief Complaint   Patient presents with    Sore Throat     started 2 days ago    Nasal Congestion     starting today with nasal and sinus congestion     \"REVIEWED RECORD IN PREPARATION FOR VISIT AND HAVE OBTAINED THE NECESSARY DOCUMENTATION\"  1. Have you been to the ER, urgent care clinic since your last visit? Hospitalized since your last visit? No    2. Have you seen or consulted any other health care providers outside of the [address] [place] [address] since your last visit? Include any pap smears or colon screening. No  Patient does not have advanced directives.

## 2017-04-26 ENCOUNTER — OFFICE VISIT (OUTPATIENT)
Dept: FAMILY MEDICINE CLINIC | Age: 62
End: 2017-04-26

## 2017-04-26 VITALS
HEIGHT: 66 IN | OXYGEN SATURATION: 96 % | WEIGHT: 253 LBS | RESPIRATION RATE: 20 BRPM | HEART RATE: 67 BPM | TEMPERATURE: 98.6 F | SYSTOLIC BLOOD PRESSURE: 100 MMHG | BODY MASS INDEX: 40.66 KG/M2 | DIASTOLIC BLOOD PRESSURE: 62 MMHG

## 2017-04-26 DIAGNOSIS — J02.9 SORE THROAT: Primary | ICD-10-CM

## 2017-04-26 DIAGNOSIS — Z91.09 POLLEN ALLERGIES: ICD-10-CM

## 2017-04-26 LAB
S PYO AG THROAT QL: NEGATIVE
VALID INTERNAL CONTROL?: YES

## 2017-04-26 RX ORDER — CETIRIZINE HCL 10 MG
10 TABLET ORAL DAILY
Qty: 30 TAB | Refills: 2 | COMMUNITY
Start: 2017-04-26 | End: 2018-11-12 | Stop reason: SDUPTHER

## 2017-04-26 RX ORDER — AZITHROMYCIN 250 MG/1
TABLET, FILM COATED ORAL
Qty: 6 TAB | Refills: 0 | Status: SHIPPED | OUTPATIENT
Start: 2017-04-26 | End: 2017-05-01

## 2017-04-26 NOTE — PATIENT INSTRUCTIONS
Back Care and Preventing Injuries: Care Instructions  Your Care Instructions  You can hurt your back doing many everyday activities: lifting a heavy box, bending down to garden, exercising at the gym, and even getting out of bed. But you can keep your back strong and healthy by doing some exercises. You also can follow a few tips for sitting, sleeping, and lifting to avoid hurting your back again. Talk to your doctor before you start an exercise program. Ask for help if you want to learn more about keeping your back healthy. Follow-up care is a key part of your treatment and safety. Be sure to make and go to all appointments, and call your doctor if you are having problems. It's also a good idea to know your test results and keep a list of the medicines you take. How can you care for yourself at home? · Stay at a healthy weight to avoid strain on your lower back. · Do not smoke. Smoking increases the risk of osteoporosis, which weakens the spine. If you need help quitting, talk to your doctor about stop-smoking programs and medicines. These can increase your chances of quitting for good. · Make sure you sleep in a position that maintains your back's normal curves and on a mattress that feels comfortable. Sleep on your side with a pillow between your knees, or sleep on your back with a pillow under your knees. These positions can reduce strain on your back. · When you get out of bed, lie on your side and bend both knees. Drop your feet over the edge of the bed as you push up with both arms. Scoot to the edge of the bed. Make sure your feet are in line with your rear end (buttocks), and then stand up. · If you must stand for a long time, put one foot on a stool, ledge, or box. Exercise to strengthen your back and other muscles  · Get at least 30 minutes of exercise on most days of the week. Walking is a good choice.  You also may want to do other activities, such as running, swimming, cycling, or playing tennis or team sports. · Stretch your back muscles. Here are few exercises to try:  Shahzad Sharma on your back with your knees bent and your feet flat on the floor. Gently pull one bent knee to your chest. Put that foot back on the floor, and then pull the other knee to your chest. Hold for 15 to 30 seconds. Repeat 2 to 4 times. ¨ Do pelvic tilts. Lie on your back with your knees bent. Tighten your stomach muscles. Pull your belly button (navel) in and up toward your ribs. You should feel like your back is pressing to the floor and your hips and pelvis are slightly lifting off the floor. Hold for 6 seconds while breathing smoothly. · Keep your core muscles strong. The muscles of your back, belly (abdomen), and buttocks support your spine. ¨ Pull in your belly, and imagine pulling your navel toward your spine. Hold this for 6 seconds, then relax. Remember to keep breathing normally as you tense your muscles. ¨ Do curl-ups. Always do them with your knees bent. Keep your low back on the floor, and curl your shoulders toward your knees using a smooth, slow motion. Keep your arms folded across your chest. If this bothers your neck, try putting your hands behind your neck (not your head), with your elbows spread apart. ¨ Lie on your back with your knees bent and your feet flat on the floor. Tighten your belly muscles, and then push with your feet and raise your buttocks up a few inches. Hold this position 6 seconds as you continue to breathe normally, then lower yourself slowly to the floor. Repeat 8 to 12 times. ¨ If you like group exercise, try Pilates or yoga. These classes have poses that strengthen the core muscles. Protect your back when you sit  · Place a small pillow, a rolled-up towel, or a lumbar roll in the curve of your back if you need extra support. · Sit in a chair that is low enough to let you place both feet flat on the floor with both knees nearly level with your hips.  If your chair or desk is too high, use a foot rest to raise your knees. · When driving, keep your knees nearly level with your hips. Sit straight, and drive with both hands on the steering wheel. Your arms should be in a slightly bent position. · Try a kneeling chair, which helps tilt your hips forward. This takes pressure off your lower back. · Try sitting on an exercise ball. It can rock from side to side, which helps keep your back loose. Lift properly  · Squat down, bending at the hips and knees only. If you need to, put one knee to the floor and extend your other knee in front of you, bent at a right angle (half kneeling). · Press your chest straight forward. This helps keep your upper back straight while keeping a slight arch in your low back. · Hold the load as close to your body as possible, at the level of your navel. · Use your feet to change direction, taking small steps. · Lead with your hips as you change direction. Keep your shoulders in line with your hips as you move. Do not twist your body. · Set down your load carefully, squatting with your knees and hips only. When should you call for help? Watch closely for changes in your health, and be sure to contact your doctor if:  · You want more exercises to make your back and other core muscles stronger. Where can you learn more? Go to http://kelli-carl.info/. Enter S810 in the search box to learn more about \"Back Care and Preventing Injuries: Care Instructions. \"  Current as of: May 23, 2016  Content Version: 11.2  © 7163-1102 [employer]. Care instructions adapted under license by [employer] (which disclaims liability or warranty for this information). If you have questions about a medical condition or this instruction, always ask your healthcare professional. [address] any warranty or liability for your use of this information.

## 2017-04-26 NOTE — PROGRESS NOTES
Subjective:   Leslie Lyle is a 62 y.o. female who complains of sore throat and dry cough for 3 days, gradually worsening since that time. She denies a history of fevers, shortness of breath and wheezing. Evaluation to date: 2 weeks ago. Treatment to date: antibiotics -Augmentin x 1 week. Patient does not smoke cigarettes. Relevant PMH: DM.    Patient Active Problem List    Diagnosis Date Noted    S/P TOMASA (total abdominal hysterectomy) 04/19/2016    Type II diabetes mellitus ([facility] [address] 04/19/2016    Essential hypertension 03/23/2016    Hypercholesterolemia 03/23/2016    ANNA on CPAP 07/01/2014    MOFFETT (nonalcoholic steatohepatitis) 06/28/2014    Lung nodule seen on imaging study 06/28/2014    Obesity 09/05/2011    Hypothyroidism 09/04/2011    Depression 09/04/2011    GERD 09/04/2011    Fibromyalgia 09/04/2011     Current Outpatient Prescriptions   Medication Sig Dispense Refill    lamoTRIgine (LAMICTAL) 100 mg tablet Take 100 mg by mouth daily.  VYVANSE 60 mg capsule TAKE ONE CAPSULE BY MOUTH EVERY DAY  0    doxepin (SINEQUAN) 25 mg capsule TAKE 1 CAPSULE BY MOUTH AT BEDTIME 90 Cap 3    escitalopram oxalate (LEXAPRO) 20 mg tablet TAKE 2 TABLETS BY MOUTH DAILY 180 Tab 3    levothyroxine (SYNTHROID) 175 mcg tablet Take 1 Tab by mouth Daily (before breakfast).  90 Tab 1    pregabalin (LYRICA) 150 mg capsule TAKE ONE CAPSULE BY MOUTH TWICE A DAY  Indications: FIBROMYALGIA 180 Cap 1    amLODIPine (NORVASC) 5 mg tablet TAKE 1 TABLET BY MOUTH DAILY FOR PRINZMETAL ANGINA 90 Tab 0    pravastatin (PRAVACHOL) 40 mg tablet TAKE 1 TABLET BY MOUTH EVERY NIGHT 90 Tab 0    omeprazole (PRILOSEC) 20 mg capsule TAKE 1 CAPSULE BY MOUTH TWICE DAILY 180 Cap 0    metFORMIN (GLUCOPHAGE) 500 mg tablet TAKE 1 TABLET BY MOUTH TWICE DAILY WITH MEALS 180 Tab 0    cyclobenzaprine (FLEXERIL) 5 mg tablet TAKE 2 TABLETS BY MOUTH 3 TIMES A DAY AS NEEDED FOR MUSCLE SPASM  0     Allergies   Allergen Reactions    No Allergy Information Available Other (comments)     unresponsive    No Known Drug Allergies Unknown (comments)     NKDA per pt's      Past Medical History:   Diagnosis Date    Depression     Diabetes ([address]     Gastrointestinal disorder     gerd    High cholesterol     Obese     Other ill-defined conditions     fibromyalgia    Psychiatric disorder     depression        Review of Systems  Pertinent items are noted in HPI. Objective:     Visit Vitals    /62 (BP 1 Location: Left arm, BP Patient Position: Sitting)  Comment: sachin    Pulse 67    Temp 98.6 °F (37 °C) (Oral)    Resp 20    Ht 5' 6\" (1.676 m)    Wt 253 lb (114.8 kg)    SpO2 96%    BMI 40.84 kg/m2     General:  alert, cooperative, no distress, moderately obese   Eyes: negative   Ears: abnormal TM AD - dull, abnormal TM AS - dull   Sinuses: Normal paranasal sinuses without tenderness   Mouth:  Lips, mucosa, and tongue normal. Teeth and gums normal   Neck: supple, symmetrical, trachea midline and no adenopathy. Heart: S1 and S2 normal, no murmurs noted. Lungs: clear to auscultation bilaterally   Abdomen:         Results for orders placed or performed in visit on 04/26/17   AMB POC RAPID STREP A   Result Value Ref Range    VALID INTERNAL CONTROL POC Yes     Group A Strep Ag Negative Negative       Assessment/Plan:   allergic rhinitis      ICD-10-CM ICD-9-CM    1. Sore throat J02.9 462 AMB POC RAPID STREP A      azithromycin (ZITHROMAX) 250 mg tablet   2. Pollen allergies J30.1 477.0 cetirizine (ZYRTEC) 10 mg tablet   . Return for OV and labs to check DM, lipids.

## 2017-04-26 NOTE — MR AVS SNAPSHOT
Visit Information Date & Time Provider Department Dept. Phone Encounter #  
 6/64/2821  5:90 PM Jaya Brooks Tho [phone] [id number] Upcoming Health Maintenance Date Due  
 EYE EXAM RETINAL OR DILATED Q1 8/17/1966 ZOSTER VACCINE AGE 60> 8/17/2016 HEMOGLOBIN A1C Q6M 4/25/2017 FOOT EXAM Q1 5/25/2017 MICROALBUMIN Q1 5/25/2017 Pneumococcal 19-64 Medium Risk (1 of 1 - PPSV23) 8/23/2021* LIPID PANEL Q1 10/25/2017 BREAST CANCER SCRN MAMMOGRAM 12/30/2017 PAP AKA CERVICAL CYTOLOGY 11/19/2018 COLONOSCOPY 1/21/2019 DTaP/Tdap/Td series (2 - Td) 9/1/2023 *Topic was postponed. The date shown is not the original due date. Allergies as of 4/26/2017  Review Complete On: 6/32/1867 By: Kelli Garcia MD  
  
 Severity Noted Reaction Type Reactions No Allergy Information Available  09/04/2011    Other (comments)  
 unresponsive No Known Drug Allergies  09/04/2011    Unknown (comments) NKDA per pt's  Current Immunizations  Reviewed on 10/10/2016 Name Date Influenza Vaccine (Quad) 11/19/2015 11:47 AM  
 Influenza Vaccine (Quad) PF 10/10/2016, 1/28/2015 Tdap 9/1/2013  6:31 PM  
  
 Not reviewed this visit You Were Diagnosed With   
  
 Codes Comments Sore throat    -  Primary ICD-10-CM: J02.9 ICD-9-CM: 673 Pollen allergies     ICD-10-CM: J30.1 ICD-9-CM: 477.0 Vitals BP Pulse Temp Resp Height(growth percentile) Weight(growth percentile) 100/62 (BP 1 Location: Left arm, BP Patient Position: Sitting) 67 98.6 °F (37 °C) (Oral) 20 5' 6\" (1.676 m) 253 lb (114.8 kg) SpO2 BMI OB Status Smoking Status 96% 40.84 kg/m2 Hysterectomy Former Smoker Vitals History BMI and BSA Data Body Mass Index Body Surface Area  
 40.84 kg/m 2 2.31 m 2 Preferred Pharmacy Pharmacy Name Phone [facility]/PHARMACY #[id number] - [place] - [address] [address].. [phone] Your Updated Medication List  
  
   
This list is accurate as of: 4/26/17  3:43 PM.  Always use your most recent med list. amLODIPine 5 mg tablet Commonly known as:  Javier Lopez TAKE 1 TABLET BY MOUTH DAILY FOR PRINZMETAL ANGINA  
  
 azithromycin 250 mg tablet Commonly known as:  Sherryle Nutley Take 2 tabs today, then 1 tab daily x 4 days. cetirizine 10 mg tablet Commonly known as:  ZYRTEC Take 1 Tab by mouth daily. Indications: ALLERGIC RHINITIS  
  
 cyclobenzaprine 5 mg tablet Commonly known as:  FLEXERIL  
TAKE 2 TABLETS BY MOUTH 3 TIMES A DAY AS NEEDED FOR MUSCLE SPASM  
  
 doxepin 25 mg capsule Commonly known as:  SINEquan TAKE 1 CAPSULE BY MOUTH AT BEDTIME  
  
 escitalopram oxalate 20 mg tablet Commonly known as:  Wendelin Saas TAKE 2 TABLETS BY MOUTH DAILY  
  
 lamoTRIgine 100 mg tablet Commonly known as: LaMICtal  
Take 100 mg by mouth daily. levothyroxine 175 mcg tablet Commonly known as:  SYNTHROID Take 1 Tab by mouth Daily (before breakfast). metFORMIN 500 mg tablet Commonly known as:  GLUCOPHAGE  
TAKE 1 TABLET BY MOUTH TWICE DAILY WITH MEALS  
  
 omeprazole 20 mg capsule Commonly known as:  PRILOSEC  
TAKE 1 CAPSULE BY MOUTH TWICE DAILY pravastatin 40 mg tablet Commonly known as:  PRAVACHOL  
TAKE 1 TABLET BY MOUTH EVERY NIGHT  
  
 pregabalin 150 mg capsule Commonly known as:  York Pace TAKE ONE CAPSULE BY MOUTH TWICE A DAY  Indications: FIBROMYALGIA  
  
 VYVANSE 60 mg capsule Generic drug:  Lisdexamfetamine TAKE ONE CAPSULE BY MOUTH EVERY DAY Prescriptions Sent to Pharmacy Refills  
 azithromycin (ZITHROMAX) 250 mg tablet 0 Sig: Take 2 tabs today, then 1 tab daily x 4 days. Class: Normal  
 Pharmacy: [facility]/pharmacy #[id number] - [place], [place] - [address] Ph #: [phone] We Performed the Following AMB POC RAPID STREP A [70350 CPT(R)] Patient Instructions Back Care and Preventing Injuries: Care Instructions Your Care Instructions You can hurt your back doing many everyday activities: lifting a heavy box, bending down to garden, exercising at the gym, and even getting out of bed. But you can keep your back strong and healthy by doing some exercises. You also can follow a few tips for sitting, sleeping, and lifting to avoid hurting your back again. Talk to your doctor before you start an exercise program. Ask for help if you want to learn more about keeping your back healthy. Follow-up care is a key part of your treatment and safety. Be sure to make and go to all appointments, and call your doctor if you are having problems. It's also a good idea to know your test results and keep a list of the medicines you take. How can you care for yourself at home? · Stay at a healthy weight to avoid strain on your lower back. · Do not smoke. Smoking increases the risk of osteoporosis, which weakens the spine. If you need help quitting, talk to your doctor about stop-smoking programs and medicines. These can increase your chances of quitting for good. · Make sure you sleep in a position that maintains your back's normal curves and on a mattress that feels comfortable. Sleep on your side with a pillow between your knees, or sleep on your back with a pillow under your knees. These positions can reduce strain on your back. · When you get out of bed, lie on your side and bend both knees. Drop your feet over the edge of the bed as you push up with both arms. Scoot to the edge of the bed. Make sure your feet are in line with your rear end (buttocks), and then stand up. · If you must stand for a long time, put one foot on a stool, ledge, or box. Exercise to strengthen your back and other muscles · Get at least 30 minutes of exercise on most days of the week. Walking is a good choice.  You also may want to do other activities, such as running, swimming, cycling, or playing tennis or team sports. · Stretch your back muscles. Here are few exercises to try: ¨ Lie on your back with your knees bent and your feet flat on the floor. Gently pull one bent knee to your chest. Put that foot back on the floor, and then pull the other knee to your chest. Hold for 15 to 30 seconds. Repeat 2 to 4 times. ¨ Do pelvic tilts. Lie on your back with your knees bent. Tighten your stomach muscles. Pull your belly button (navel) in and up toward your ribs. You should feel like your back is pressing to the floor and your hips and pelvis are slightly lifting off the floor. Hold for 6 seconds while breathing smoothly. · Keep your core muscles strong. The muscles of your back, belly (abdomen), and buttocks support your spine. ¨ Pull in your belly, and imagine pulling your navel toward your spine. Hold this for 6 seconds, then relax. Remember to keep breathing normally as you tense your muscles. ¨ Do curl-ups. Always do them with your knees bent. Keep your low back on the floor, and curl your shoulders toward your knees using a smooth, slow motion. Keep your arms folded across your chest. If this bothers your neck, try putting your hands behind your neck (not your head), with your elbows spread apart. ¨ Lie on your back with your knees bent and your feet flat on the floor. Tighten your belly muscles, and then push with your feet and raise your buttocks up a few inches. Hold this position 6 seconds as you continue to breathe normally, then lower yourself slowly to the floor. Repeat 8 to 12 times. ¨ If you like group exercise, try Pilates or yoga. These classes have poses that strengthen the core muscles. Protect your back when you sit · Place a small pillow, a rolled-up towel, or a lumbar roll in the curve of your back if you need extra support.  
· Sit in a chair that is low enough to let you place both feet flat on the floor with both knees nearly level with your hips. If your chair or desk is too high, use a foot rest to raise your knees. · When driving, keep your knees nearly level with your hips. Sit straight, and drive with both hands on the steering wheel. Your arms should be in a slightly bent position. · Try a kneeling chair, which helps tilt your hips forward. This takes pressure off your lower back. · Try sitting on an exercise ball. It can rock from side to side, which helps keep your back loose. Lift properly · Squat down, bending at the hips and knees only. If you need to, put one knee to the floor and extend your other knee in front of you, bent at a right angle (half kneeling). · Press your chest straight forward. This helps keep your upper back straight while keeping a slight arch in your low back. · Hold the load as close to your body as possible, at the level of your navel. · Use your feet to change direction, taking small steps. · Lead with your hips as you change direction. Keep your shoulders in line with your hips as you move. Do not twist your body. · Set down your load carefully, squatting with your knees and hips only. When should you call for help? Watch closely for changes in your health, and be sure to contact your doctor if: 
· You want more exercises to make your back and other core muscles stronger. Where can you learn more? Go to http://kelli-carl.info/. Enter S810 in the search box to learn more about \"Back Care and Preventing Injuries: Care Instructions. \" Current as of: May 23, 2016 Content Version: 11.2 © 8944-8845 [employer]. Care instructions adapted under license by [employer] (which disclaims liability or warranty for this information).  If you have questions about a medical condition or this instruction, always ask your healthcare professional. [address] any warranty or liability for your use of this information. Introducing [facility]! Rob Carvajal introduces [employer] patient portal. Now you can access parts of your medical record, email your doctor's office, and request medication refills online. 1. In your internet browser, go to [email or web address] 2. Click on the First Time User? Click Here link in the Sign In box. You will see the New Member Sign Up page. 3. Enter your [employer] Access Code exactly as it appears below. You will not need to use this code after youve completed the sign-up process. If you do not sign up before the expiration date, you must request a new code. · [employer] Access Code: [email or web address] Expires: 6/8/2017  4:09 PM 
 
4. Enter the last four digits of your Social Security Number (xxxx) and Date of Birth (mm/dd/yyyy) as indicated and click Submit. You will be taken to the next sign-up page. 5. Create a [employer] ID. This will be your [employer] login ID and cannot be changed, so think of one that is secure and easy to remember. 6. Create a [employer] password. You can change your password at any time. 7. Enter your Password Reset Question and Answer. This can be used at a later time if you forget your password. 8. Enter your e-mail address. You will receive e-mail notification when new information is available in [address] 9. Click Sign Up. You can now view and download portions of your medical record. 10. Click the Download Summary menu link to download a portable copy of your medical information. If you have questions, please visit the Frequently Asked Questions section of the [employer] website. Remember, [employer] is NOT to be used for urgent needs. For medical emergencies, dial 911. Now available from your iPhone and Android! Please provide this summary of care documentation to your next provider. Your primary care clinician is listed as Jesusita Patel.  If you have any questions after today's visit, please call [phone].

## 2017-04-26 NOTE — PROGRESS NOTES
Identified pt with two pt identifiers(name and ). Chief Complaint   Patient presents with    Sore Throat     started day before yesterday - her grandson has strep     Nausea    Diarrhea     little    Cough     some        Health Maintenance Due   Topic    EYE EXAM RETINAL OR DILATED Q1     ZOSTER VACCINE AGE 60>     HEMOGLOBIN A1C Q6M     FOOT EXAM Q1     MICROALBUMIN Q1    she will come back for Diabetic check. Wt Readings from Last 3 Encounters:   17 253 lb (114.8 kg)   17 257 lb 3.2 oz (116.7 kg)   03/10/17 251 lb 9.6 oz (114.1 kg)     Temp Readings from Last 3 Encounters:   17 98.6 °F (37 °C) (Oral)   17 98.5 °F (36.9 °C) (Oral)   03/10/17 98.3 °F (36.8 °C) (Oral)     BP Readings from Last 3 Encounters:   17 100/62   17 130/75   03/10/17 140/71     Pulse Readings from Last 3 Encounters:   17 67   17 80   03/10/17 74         Learning Assessment:  :     Learning Assessment 2014   PRIMARY LEARNER Patient   HIGHEST LEVEL OF EDUCATION - PRIMARY LEARNER  DID NOT GRADUATE HIGH SCHOOL   BARRIERS PRIMARY LEARNER NONE   CO-LEARNER CAREGIVER No   PRIMARY LANGUAGE ENGLISH   LEARNER PREFERENCE PRIMARY OTHER (COMMENT)   ANSWERED BY patient   RELATIONSHIP SELF       Depression Screening:  :     PHQ 2 / 9, over the last two weeks 2016   Little interest or pleasure in doing things Several days   Feeling down, depressed or hopeless Several days   Total Score PHQ 2 2       Fall Risk Assessment:  :     Fall Risk Assessment, last 12 mths 2016   Able to walk? Yes   Fall in past 12 months? No       Abuse Screening:  :     Abuse Screening Questionnaire 10/10/2016 2015   Do you ever feel afraid of your partner? N N   Are you in a relationship with someone who physically or mentally threatens you? N N   Is it safe for you to go home?  Y Y       Coordination of Care Questionnaire:  :     1) Have you been to an emergency room, urgent care clinic since your last visit? no   Hospitalized since your last visit? no             2) Have you seen or consulted any other health care providers outside of [address] [place] [address] since your last visit? no  (Include any pap smears or colon screenings in this section.)    3) Do you have an Advance Directive on file? no  Are you interested in receiving information about Advance Directives? no    Patient is accompanied by spouse I have received verbal consent from Caitlin Kessler to discuss any/all medical information while they are present in the room. Reviewed record in preparation for visit and have obtained necessary documentation. Medication reconciliation up to date and corrected with patient at this time.

## 2017-07-03 ENCOUNTER — OFFICE VISIT (OUTPATIENT)
Dept: FAMILY MEDICINE CLINIC | Age: 62
End: 2017-07-03

## 2017-07-03 VITALS
BODY MASS INDEX: 40.34 KG/M2 | HEIGHT: 66 IN | WEIGHT: 251 LBS | HEART RATE: 68 BPM | OXYGEN SATURATION: 97 % | DIASTOLIC BLOOD PRESSURE: 68 MMHG | SYSTOLIC BLOOD PRESSURE: 134 MMHG | TEMPERATURE: 97.9 F | RESPIRATION RATE: 20 BRPM

## 2017-07-03 DIAGNOSIS — Z23 ENCOUNTER FOR IMMUNIZATION: ICD-10-CM

## 2017-07-03 DIAGNOSIS — E11.9 TYPE 2 DIABETES MELLITUS WITHOUT COMPLICATION, WITHOUT LONG-TERM CURRENT USE OF INSULIN (HCC): Primary | ICD-10-CM

## 2017-07-03 DIAGNOSIS — K75.81 NASH (NONALCOHOLIC STEATOHEPATITIS): ICD-10-CM

## 2017-07-03 DIAGNOSIS — I10 ESSENTIAL HYPERTENSION: ICD-10-CM

## 2017-07-03 DIAGNOSIS — E78.00 HYPERCHOLESTEROLEMIA: ICD-10-CM

## 2017-07-03 DIAGNOSIS — E03.9 ACQUIRED HYPOTHYROIDISM: Chronic | ICD-10-CM

## 2017-07-03 NOTE — PROGRESS NOTES
Subjective:     Meagan Coy is a 62 y.o. female who presents for follow up of diabetes, hypertension, hyperlipidemia and hypothyroidism. Diet and Lifestyle: generally follows a low fat low cholesterol diet, generally follows a low sodium diet, exercises sporadically, nonsmoker  Home BP Monitoring: is not measured at home    Cardiovascular ROS: taking medications as instructed, no medication side effects noted, no TIA's, no chest pain on exertion, no dyspnea on exertion, no swelling of ankles. New concerns: she is working FT at [employer] and is moving a lot. Weight is stable. Good energy. Reviewed labs. She is not taking Lyrica regularly. Some numbness of both first toes. Patient Active Problem List    Diagnosis Date Noted    S/P TOMASA (total abdominal hysterectomy) 04/19/2016    Type II diabetes mellitus ([facility] [address] 04/19/2016    Essential hypertension 03/23/2016    Hypercholesterolemia 03/23/2016    ANNA on CPAP 07/01/2014    MOFFETT (nonalcoholic steatohepatitis) 06/28/2014    Lung nodule seen on imaging study 06/28/2014    Obesity 09/05/2011    Hypothyroidism 09/04/2011    Depression 09/04/2011    GERD 09/04/2011    Fibromyalgia 09/04/2011     Current Outpatient Prescriptions   Medication Sig Dispense Refill    varicella zoster vacine live (ZOSTAVAX) 19,400 unit/0.65 mL susr injection 1 Vial by SubCUTAneous route once for 1 dose. 0.65 mL 0    levothyroxine (SYNTHROID) 175 mcg tablet TAKE 1 TABLET BY MOUTH DAILY ( BEFORE BREAKFAST ) 90 Tab 0    lamoTRIgine (LAMICTAL) 100 mg tablet Take 100 mg by mouth daily.       VYVANSE 60 mg capsule TAKE ONE CAPSULE BY MOUTH EVERY DAY  0    doxepin (SINEQUAN) 25 mg capsule TAKE 1 CAPSULE BY MOUTH AT BEDTIME 90 Cap 3    escitalopram oxalate (LEXAPRO) 20 mg tablet TAKE 2 TABLETS BY MOUTH DAILY 180 Tab 3    pregabalin (LYRICA) 150 mg capsule TAKE ONE CAPSULE BY MOUTH TWICE A DAY  Indications: FIBROMYALGIA 180 Cap 1    amLODIPine (NORVASC) 5 mg tablet TAKE 1 TABLET BY MOUTH DAILY FOR PRINZMETAL ANGINA 90 Tab 0    pravastatin (PRAVACHOL) 40 mg tablet TAKE 1 TABLET BY MOUTH EVERY NIGHT 90 Tab 0    omeprazole (PRILOSEC) 20 mg capsule TAKE 1 CAPSULE BY MOUTH TWICE DAILY 180 Cap 0    metFORMIN (GLUCOPHAGE) 500 mg tablet TAKE 1 TABLET BY MOUTH TWICE DAILY WITH MEALS 180 Tab 0    cetirizine (ZYRTEC) 10 mg tablet Take 1 Tab by mouth daily.  Indications: ALLERGIC RHINITIS 30 Tab 2     Allergies   Allergen Reactions    No Allergy Information Available Other (comments)     unresponsive    No Known Drug Allergies Unknown (comments)     NKDA per pt's      Past Medical History:   Diagnosis Date    Depression     Diabetes ([facility] [address]     Gastrointestinal disorder     gerd    High cholesterol     Obese     Other ill-defined conditions     fibromyalgia    Psychiatric disorder     depression     Past Surgical History:   Procedure Laterality Date    DELIVERY       X 2    HX GYN      hysterectomy    HX SEPTOPLASTY       Family History   Problem Relation Age of Onset    Hypertension Mother     Heart Disease Father     Cancer Brother      Social History   Substance Use Topics    Smoking status: Former Smoker    Smokeless tobacco: Never Used    Alcohol use No      Comment: rarely         Lab Results  Component Value Date/Time   WBC 4.9 2017 08:32 AM   HGB 12.7 2017 08:32 AM   Hemoglobin (POC) 13.9 2011 10:09 AM   HCT 40.6 2017 08:32 AM   Hematocrit (POC) 41 2011 10:09 AM   PLATELET 793  08:32 AM   MCV 85 2017 08:32 AM     Lab Results  Component Value Date/Time   Hemoglobin A1c 5.7 2017 08:32 AM   Hemoglobin A1c 5.9 10/25/2016 09:37 AM   Hemoglobin A1c 6.6 2016 10:55 AM   Glucose 118 2017 08:32 AM   Glucose (POC) 101 2016 09:47 AM   Microalb/Creat ratio (ug/mg creat.) 4.1 2017 08:32 AM   LDL, calculated 127 2017 08:32 AM   Creatinine (POC) 1.0 2011 10:09 AM   Creatinine 0.86 06/26/2017 08:32 AM      Lab Results  Component Value Date/Time   Cholesterol, total 190 06/26/2017 08:32 AM   HDL Cholesterol 38 06/26/2017 08:32 AM   LDL, calculated 127 06/26/2017 08:32 AM   Triglyceride 127 06/26/2017 08:32 AM   CHOL/HDL Ratio 3.8 06/29/2014 01:00 AM   Lab Results  Component Value Date/Time   ALT (SGPT) 27 06/26/2017 08:32 AM   AST (SGOT) 25 06/26/2017 08:32 AM   Alk. phosphatase 81 06/26/2017 08:32 AM   Bilirubin, direct 0.17 04/19/2016 10:55 AM   Bilirubin, total 0.4 06/26/2017 08:32 AM   Albumin 4.0 06/26/2017 08:32 AM   Protein, total 6.2 06/26/2017 08:32 AM   Ammonia 25 09/04/2011 10:20 AM   INR 1.0 04/19/2016 10:55 AM   Prothrombin time 10.6 04/19/2016 10:55 AM   PLATELET 309 21/68/7655 08:32 AM       Lab Results  Component Value Date/Time   GFR est non-AA 74 06/26/2017 08:32 AM   GFRNA, POC >60 09/04/2011 10:09 AM   GFR est AA 85 06/26/2017 08:32 AM   GFRAA, POC >60 09/04/2011 10:09 AM   Creatinine 0.86 06/26/2017 08:32 AM   Creatinine (POC) 1.0 09/04/2011 10:09 AM   BUN 11 06/26/2017 08:32 AM   BUN (POC) 5 09/04/2011 10:09 AM   Sodium 144 06/26/2017 08:32 AM   Sodium (POC) 143 09/04/2011 10:09 AM   Potassium 4.3 06/26/2017 08:32 AM   Potassium (POC) 4.1 09/04/2011 10:09 AM   Chloride 104 06/26/2017 08:32 AM   Chloride (POC) 105 09/04/2011 10:09 AM   CO2 22 06/26/2017 08:32 AM   Magnesium 2.1 09/08/2011 04:00 AM   Phosphorus 3.7 09/05/2011 01:45 AM   Lab Results  Component Value Date/Time   TSH 0.984 06/26/2017 08:32 AM   T4, Free 1.37 06/26/2017 08:32 AM                         Review of Systems, additional:  Pertinent items are noted in HPI. Objective:   Visit Vitals    /68 (BP 1 Location: Left arm, BP Patient Position: Sitting)    Pulse 68    Temp 97.9 °F (36.6 °C) (Oral)    Resp 20    Ht 5' 6\" (1.676 m)    Wt 251 lb (113.9 kg)    SpO2 97%    BMI 40.51 kg/m2     Appearance: alert, well appearing, and in no distress and overweight.   General exam: CVS exam BP noted to be well controlled today in office, S1, S2 normal, no gallop, no murmur, chest clear, no JVD, no HSM, no edema. Diabetic foot exam:     Left:    Filament test normal sensation with micro filament   Pulse DP: 2+ (normal)   Pulse PT: 2+ (normal)   Deformities: None  Right:    Filament test normal sensation with micro filament   Pulse DP: 2+ (normal)   Pulse PT: 2+ (normal)   Deformities: Mild - discoloration of toenail  Lab review: labs are reviewed, up to date and normal.     Assessment/Plan:     diabetes well controlled, hypertension well controlled, hyperlipidemia stable. orders and follow up as documented in patient record  specific diabetic recommendations: annual eye examinations at Ophthalmology discussed. ICD-10-CM ICD-9-CM    1. Type 2 diabetes mellitus without complication, without long-term current use of insulin (HCC) E11.9 250.00  DIABETES FOOT EXAM   2. Acquired hypothyroidism E03.9 244.9    3. Essential hypertension I10 401.9    4. Hypercholesterolemia E78.00 272.0    5. MOFFETT (nonalcoholic steatohepatitis) K75.81 571.8    6. Encounter for immunization Z23 V03.89 varicella zoster vacine live (ZOSTAVAX) 19,400 unit/0.65 mL susr injection      DISCONTINUED: varicella zoster vacine live (ZOSTAVAX) 19,400 unit/0.65 mL susr injection     reviewed lab results. Order Zostavax. Recommend get eye exam.  FU 6 months. I have reviewed/discussed the above normal BMI with the patient. I have recommended the following interventions: dietary management education, guidance, and counseling and encourage exercise . Jorge Osborn

## 2017-07-03 NOTE — PROGRESS NOTES
Identified pt with two pt identifiers(name and ). Chief Complaint   Patient presents with    Diabetes    Results        Health Maintenance Due   Topic    EYE EXAM RETINAL OR DILATED Q1     ZOSTER VACCINE AGE 60>     FOOT EXAM Q1        Wt Readings from Last 3 Encounters:   17 251 lb (113.9 kg)   17 253 lb (114.8 kg)   17 257 lb 3.2 oz (116.7 kg)     Temp Readings from Last 3 Encounters:   17 97.9 °F (36.6 °C) (Oral)   17 98.6 °F (37 °C) (Oral)   17 98.5 °F (36.9 °C) (Oral)     BP Readings from Last 3 Encounters:   17 134/68   17 100/62   17 130/75     Pulse Readings from Last 3 Encounters:   17 68   17 67   17 80         Learning Assessment:  :     Learning Assessment 2014   PRIMARY LEARNER Patient   HIGHEST LEVEL OF EDUCATION - PRIMARY LEARNER  DID NOT GRADUATE HIGH SCHOOL   BARRIERS PRIMARY LEARNER NONE   CO-LEARNER CAREGIVER No   PRIMARY LANGUAGE ENGLISH   LEARNER PREFERENCE PRIMARY OTHER (COMMENT)   ANSWERED BY patient   RELATIONSHIP SELF       Depression Screening:  :     PHQ over the last two weeks 2016   Little interest or pleasure in doing things Several days   Feeling down, depressed or hopeless Several days   Total Score PHQ 2 2       Fall Risk Assessment:  :     Fall Risk Assessment, last 12 mths 2016   Able to walk? Yes   Fall in past 12 months? No       Abuse Screening:  :     Abuse Screening Questionnaire 10/10/2016 2015   Do you ever feel afraid of your partner? N N   Are you in a relationship with someone who physically or mentally threatens you? N N   Is it safe for you to go home?  Y Y       Coordination of Care Questionnaire:  :     1) Have you been to an emergency room, urgent care clinic since your last visit? no   Hospitalized since your last visit? no             2) Have you seen or consulted any other health care providers outside of [address] [place] [address] since your last visit? no (Include any pap smears or colon screenings in this section.)    3) Do you have an Advance Directive on file? no  Are you interested in receiving information about Advance Directives? no    Patient is accompanied by daughter I have received verbal consent from Hina Ravi to discuss any/all medical information while they are present in the room. Reviewed record in preparation for visit and have obtained necessary documentation. Medication reconciliation up to date and corrected with patient at this time.

## 2017-07-03 NOTE — MR AVS SNAPSHOT
Visit Information Date & Time Provider Department Dept. Phone Encounter #  
 5/1/1038  3:73 PM Jaya Badillo Tho [phone] [id number] Upcoming Health Maintenance Date Due  
 EYE EXAM RETINAL OR DILATED Q1 8/17/1966 ZOSTER VACCINE AGE 60> 8/17/2016 Pneumococcal 19-64 Medium Risk (1 of 1 - PPSV23) 8/23/2021* INFLUENZA AGE 9 TO ADULT 8/1/2017 HEMOGLOBIN A1C Q6M 12/26/2017 BREAST CANCER SCRN MAMMOGRAM 12/30/2017 MICROALBUMIN Q1 6/26/2018 LIPID PANEL Q1 6/26/2018 FOOT EXAM Q1 7/3/2018 PAP AKA CERVICAL CYTOLOGY 11/19/2018 COLONOSCOPY 1/21/2019 DTaP/Tdap/Td series (2 - Td) 9/1/2023 *Topic was postponed. The date shown is not the original due date. Allergies as of 7/3/2017  Review Complete On: 1/7/3378 By: Lizz Sanchez MD  
  
 Severity Noted Reaction Type Reactions No Allergy Information Available  09/04/2011    Other (comments)  
 unresponsive No Known Drug Allergies  09/04/2011    Unknown (comments) NKDA per pt's  Current Immunizations  Reviewed on 10/10/2016 Name Date Influenza Vaccine (Quad) 11/19/2015 11:47 AM  
 Influenza Vaccine (Quad) PF 10/10/2016, 1/28/2015 Tdap 9/1/2013  6:31 PM  
  
 Not reviewed this visit You Were Diagnosed With   
  
 Codes Comments Type 2 diabetes mellitus without complication, without long-term current use of insulin (HCC)    -  Primary ICD-10-CM: E11.9 ICD-9-CM: 250.00 Acquired hypothyroidism     ICD-10-CM: E03.9 ICD-9-CM: 244.9 Essential hypertension     ICD-10-CM: I10 
ICD-9-CM: 401.9 Hypercholesterolemia     ICD-10-CM: E78.00 ICD-9-CM: 272.0   
 MOFFETT (nonalcoholic steatohepatitis)     ICD-10-CM: Y00.79 ICD-9-CM: 571.8 Encounter for immunization     ICD-10-CM: P48 ICD-9-CM: V03.89 Vitals BP Pulse Temp Resp Height(growth percentile) Weight(growth percentile) 134/68 (BP 1 Location: Left arm, BP Patient Position: Sitting) 68 97.9 °F (36.6 °C) (Oral) 20 5' 6\" (1.676 m) 251 lb (113.9 kg) SpO2 BMI OB Status Smoking Status 97% 40.51 kg/m2 Hysterectomy Former Smoker BMI and BSA Data Body Mass Index Body Surface Area 40.51 kg/m 2 2.3 m 2 Preferred Pharmacy Pharmacy Name Phone [address] [place] [address], [facility] [phone] Your Updated Medication List  
  
   
This list is accurate as of: 7/3/17  2:58 PM.  Always use your most recent med list. amLODIPine 5 mg tablet Commonly known as:  Du Kj TAKE 1 TABLET BY MOUTH DAILY FOR PRINZMETAL ANGINA  
  
 cetirizine 10 mg tablet Commonly known as:  ZYRTEC Take 1 Tab by mouth daily. Indications: ALLERGIC RHINITIS  
  
 doxepin 25 mg capsule Commonly known as:  SINEquan TAKE 1 CAPSULE BY MOUTH AT BEDTIME  
  
 escitalopram oxalate 20 mg tablet Commonly known as:  Lopez Aas TAKE 2 TABLETS BY MOUTH DAILY  
  
 lamoTRIgine 100 mg tablet Commonly known as: LaMICtal  
Take 100 mg by mouth daily. levothyroxine 175 mcg tablet Commonly known as:  SYNTHROID  
TAKE 1 TABLET BY MOUTH DAILY ( BEFORE BREAKFAST )  
  
 metFORMIN 500 mg tablet Commonly known as:  GLUCOPHAGE  
TAKE 1 TABLET BY MOUTH TWICE DAILY WITH MEALS  
  
 omeprazole 20 mg capsule Commonly known as:  PRILOSEC  
TAKE 1 CAPSULE BY MOUTH TWICE DAILY pravastatin 40 mg tablet Commonly known as:  PRAVACHOL  
TAKE 1 TABLET BY MOUTH EVERY NIGHT  
  
 pregabalin 150 mg capsule Commonly known as:  Arcadio Lefort TAKE ONE CAPSULE BY MOUTH TWICE A DAY  Indications: FIBROMYALGIA  
  
 varicella zoster vacine live 19,400 unit/0.65 mL Susr injection Commonly known as:  ZOSTAVAX  
1 Vial by SubCUTAneous route once for 1 dose. VYVANSE 60 mg capsule Generic drug:  Lisdexamfetamine TAKE ONE CAPSULE BY MOUTH EVERY DAY Prescriptions Printed Refills  
 varicella zoster vacine live (ZOSTAVAX) 19,400 unit/0.65 mL susr injection 0 Si Vial by SubCUTAneous route once for 1 dose. Class: Print Route: SubCUTAneous We Performed the Following  DIABETES FOOT EXAM [7 Custom] Introducing \[facility]\"" & HEALTH SERVICES! Aury Rubio introduces [employer] patient portal. Now you can access parts of your medical record, email your doctor's office, and request medication refills online. 1. In your internet browser, go to [email or web address] 2. Click on the First Time User? Click Here link in the Sign In box. You will see the New Member Sign Up page. 3. Enter your [employer] Access Code exactly as it appears below. You will not need to use this code after youve completed the sign-up process. If you do not sign up before the expiration date, you must request a new code. · [employer] Access Code: [email or web address] Expires: 2017  8:32 AM 
 
4. Enter the last four digits of your Social Security Number (xxxx) and Date of Birth (mm/dd/yyyy) as indicated and click Submit. You will be taken to the next sign-up page. 5. Create a [employer] ID. This will be your [employer] login ID and cannot be changed, so think of one that is secure and easy to remember. 6. Create a [employer] password. You can change your password at any time. 7. Enter your Password Reset Question and Answer. This can be used at a later time if you forget your password. 8. Enter your e-mail address. You will receive e-mail notification when new information is available in [address] 9. Click Sign Up. You can now view and download portions of your medical record. 10. Click the Download Summary menu link to download a portable copy of your medical information. If you have questions, please visit the Frequently Asked Questions section of the [employer] website. Remember, [employer] is NOT to be used for urgent needs. For medical emergencies, dial 911. Now available from your iPhone and Android! Please provide this summary of care documentation to your next provider. Your primary care clinician is listed as Summer Padilla. If you have any questions after today's visit, please call [phone].

## 2017-07-10 DIAGNOSIS — Z91.09 POLLEN ALLERGIES: ICD-10-CM

## 2017-07-10 DIAGNOSIS — M79.7 FIBROMYALGIA: Chronic | ICD-10-CM

## 2017-07-10 NOTE — TELEPHONE ENCOUNTER
Patient called requesting med refills. Patient requests 90 day.     Patient uses [address] home delivery

## 2017-07-11 RX ORDER — OMEPRAZOLE 20 MG/1
CAPSULE, DELAYED RELEASE ORAL
Qty: 180 CAP | Refills: 3 | Status: SHIPPED | OUTPATIENT
Start: 2017-07-11 | End: 2018-01-23 | Stop reason: SDUPTHER

## 2017-07-11 RX ORDER — PRAVASTATIN SODIUM 40 MG/1
TABLET ORAL
Qty: 90 TAB | Refills: 3 | Status: SHIPPED | OUTPATIENT
Start: 2017-07-11 | End: 2018-01-23 | Stop reason: SDUPTHER

## 2017-07-11 RX ORDER — ESCITALOPRAM OXALATE 20 MG/1
TABLET ORAL
Qty: 180 TAB | Refills: 3 | Status: SHIPPED | OUTPATIENT
Start: 2017-07-11 | End: 2017-11-24 | Stop reason: SDUPTHER

## 2017-07-11 RX ORDER — DOXEPIN HYDROCHLORIDE 25 MG/1
CAPSULE ORAL
Qty: 90 CAP | Refills: 3 | Status: SHIPPED | OUTPATIENT
Start: 2017-07-11 | End: 2018-01-23 | Stop reason: SDUPTHER

## 2017-07-11 RX ORDER — AMLODIPINE BESYLATE 5 MG/1
TABLET ORAL
Qty: 90 TAB | Refills: 3 | Status: SHIPPED | OUTPATIENT
Start: 2017-07-11 | End: 2018-01-23 | Stop reason: SDUPTHER

## 2017-07-11 RX ORDER — METFORMIN HYDROCHLORIDE 500 MG/1
TABLET ORAL
Qty: 180 TAB | Refills: 3 | Status: SHIPPED | OUTPATIENT
Start: 2017-07-11 | End: 2018-01-23 | Stop reason: SDUPTHER

## 2017-07-11 RX ORDER — LEVOTHYROXINE SODIUM 175 UG/1
TABLET ORAL
Qty: 90 TAB | Refills: 3 | Status: SHIPPED | OUTPATIENT
Start: 2017-07-11 | End: 2018-01-23 | Stop reason: SDUPTHER

## 2017-07-11 RX ORDER — LAMOTRIGINE 100 MG/1
100 TABLET ORAL DAILY
Qty: 90 TAB | Refills: 3 | Status: SHIPPED | OUTPATIENT
Start: 2017-07-11 | End: 2018-01-23 | Stop reason: SDUPTHER

## 2017-07-12 DIAGNOSIS — M79.7 FIBROMYALGIA: Chronic | ICD-10-CM

## 2017-07-12 RX ORDER — PREGABALIN 150 MG/1
CAPSULE ORAL
Qty: 20 CAP | Refills: 0 | OUTPATIENT
Start: 2017-07-12 | End: 2017-11-07 | Stop reason: SDUPTHER

## 2017-07-12 RX ORDER — PREGABALIN 150 MG/1
CAPSULE ORAL
Qty: 180 CAP | Refills: 1 | Status: SHIPPED | OUTPATIENT
Start: 2017-07-12 | End: 2017-07-12 | Stop reason: SDUPTHER

## 2017-07-12 NOTE — TELEPHONE ENCOUNTER
Patient's  came by and stated that patient is completely out of Lyrica medication and wanted to know if some could be sent to [facility] [address].      Best contact: [phone]

## 2017-07-13 RX ORDER — ESCITALOPRAM OXALATE 20 MG/1
TABLET ORAL
Qty: 180 TAB | Refills: 3 | Status: CANCELLED | OUTPATIENT
Start: 2017-07-13

## 2017-10-11 ENCOUNTER — OFFICE VISIT (OUTPATIENT)
Dept: FAMILY MEDICINE CLINIC | Age: 62
End: 2017-10-11

## 2017-10-11 VITALS
RESPIRATION RATE: 20 BRPM | HEART RATE: 68 BPM | HEIGHT: 66 IN | TEMPERATURE: 98.1 F | SYSTOLIC BLOOD PRESSURE: 118 MMHG | WEIGHT: 249.8 LBS | DIASTOLIC BLOOD PRESSURE: 65 MMHG | BODY MASS INDEX: 40.15 KG/M2 | OXYGEN SATURATION: 97 %

## 2017-10-11 DIAGNOSIS — J02.9 SORE THROAT: ICD-10-CM

## 2017-10-11 DIAGNOSIS — J03.90 TONSILLITIS: Primary | ICD-10-CM

## 2017-10-11 LAB
S PYO AG THROAT QL: NEGATIVE
VALID INTERNAL CONTROL?: YES

## 2017-10-11 RX ORDER — AMOXICILLIN 875 MG/1
875 TABLET, FILM COATED ORAL 2 TIMES DAILY
Qty: 20 TAB | Refills: 0 | Status: SHIPPED | OUTPATIENT
Start: 2017-10-11 | End: 2017-10-21

## 2017-10-11 NOTE — PROGRESS NOTES
Identified pt with two pt identifiers(name and ). Chief Complaint   Patient presents with    Sore Throat     she has had a sore throat for a couple days - her granddaughter has strep     Generalized Body Aches     she does not feel well         Health Maintenance Due   Topic    EYE EXAM RETINAL OR DILATED Q1     ZOSTER VACCINE AGE 60>     INFLUENZA AGE 9 TO ADULT     BREAST CANCER SCRN MAMMOGRAM        Wt Readings from Last 3 Encounters:   10/11/17 249 lb 12.8 oz (113.3 kg)   17 251 lb (113.9 kg)   17 253 lb (114.8 kg)     Temp Readings from Last 3 Encounters:   10/11/17 98.1 °F (36.7 °C) (Oral)   17 97.9 °F (36.6 °C) (Oral)   17 98.6 °F (37 °C) (Oral)     BP Readings from Last 3 Encounters:   10/11/17 118/65   17 134/68   17 100/62     Pulse Readings from Last 3 Encounters:   10/11/17 68   17 68   17 67         Learning Assessment:  :     Learning Assessment 2014   PRIMARY LEARNER Patient   HIGHEST LEVEL OF EDUCATION - PRIMARY LEARNER  DID NOT GRADUATE HIGH SCHOOL   BARRIERS PRIMARY LEARNER NONE   CO-LEARNER CAREGIVER No   PRIMARY LANGUAGE ENGLISH   LEARNER PREFERENCE PRIMARY OTHER (COMMENT)   ANSWERED BY patient   RELATIONSHIP SELF       Depression Screening:  :     PHQ over the last two weeks 10/11/2017   PHQ Not Done Active Diagnosis of Depression or Bipolar Disorder   Little interest or pleasure in doing things -   Feeling down, depressed or hopeless -   Total Score PHQ 2 -       Fall Risk Assessment:  :     Fall Risk Assessment, last 12 mths 10/11/2017   Able to walk? Yes   Fall in past 12 months? No       Abuse Screening:  :     Abuse Screening Questionnaire 10/11/2017 10/10/2016 2015   Do you ever feel afraid of your partner? N N N   Are you in a relationship with someone who physically or mentally threatens you? N N N   Is it safe for you to go home?  Derrick Doan       Coordination of Care Questionnaire:  :     1) Have you been to an emergency room, urgent care clinic since your last visit? no   Hospitalized since your last visit? no             2) Have you seen or consulted any other health care providers outside of [address] [place] [address] since your last visit? no  (Include any pap smears or colon screenings in this section.)    3) Do you have an Advance Directive on file? no  Are you interested in receiving information about Advance Directives? no    Reviewed record in preparation for visit and have obtained necessary documentation. Medication reconciliation up to date and corrected with patient at this time.      Results for orders placed or performed in visit on 10/11/17   AMB POC RAPID STREP A   Result Value Ref Range    VALID INTERNAL CONTROL POC Yes     Group A Strep Ag Negative Negative

## 2017-10-11 NOTE — PROGRESS NOTES
Subjective:   Valeriano Shah is a 65 y.o. female who complains of sore throat, post nasal drip and myalgias for 7 days, unchanged since that time. Her granddaughter has strep. She denies a history of shortness of breath and wheezing. Evaluation to date: none. Treatment to date: none. Patient does not smoke cigarettes. Relevant PMH: . Patient Active Problem List    Diagnosis Date Noted    S/P TOMASA (total abdominal hysterectomy) 04/19/2016    Type II diabetes mellitus ([facility] [address] 04/19/2016    Essential hypertension 03/23/2016    Hypercholesterolemia 03/23/2016    ANNA on CPAP 07/01/2014    MOFFETT (nonalcoholic steatohepatitis) 06/28/2014    Lung nodule seen on imaging study 06/28/2014    Obesity 09/05/2011    Hypothyroidism 09/04/2011    Depression 09/04/2011    GERD 09/04/2011    Fibromyalgia 09/04/2011     Current Outpatient Prescriptions   Medication Sig Dispense Refill    amoxicillin (AMOXIL) 875 mg tablet Take 1 Tab by mouth two (2) times a day for 10 days. 20 Tab 0    pregabalin (LYRICA) 150 mg capsule TAKE ONE CAPSULE BY MOUTH TWICE A DAY  Indications: FIBROMYALGIA 20 Cap 0    amLODIPine (NORVASC) 5 mg tablet TAKE 1 TABLET BY MOUTH DAILY FOR PRINZMETAL ANGINA 90 Tab 3    doxepin (SINEQUAN) 25 mg capsule TAKE 1 CAPSULE BY MOUTH AT BEDTIME 90 Cap 3    escitalopram oxalate (LEXAPRO) 20 mg tablet TAKE 2 TABLETS BY MOUTH DAILY 180 Tab 3    lamoTRIgine (LAMICTAL) 100 mg tablet Take 1 Tab by mouth daily.  90 Tab 3    levothyroxine (SYNTHROID) 175 mcg tablet TAKE 1 TABLET BY MOUTH DAILY ( BEFORE BREAKFAST ) 90 Tab 3    metFORMIN (GLUCOPHAGE) 500 mg tablet TAKE 1 TABLET BY MOUTH TWICE DAILY WITH MEALS 180 Tab 3    omeprazole (PRILOSEC) 20 mg capsule TAKE 1 CAPSULE BY MOUTH TWICE DAILY 180 Cap 3    pravastatin (PRAVACHOL) 40 mg tablet TAKE 1 TABLET BY MOUTH EVERY NIGHT 90 Tab 3    VYVANSE 60 mg capsule TAKE ONE CAPSULE BY MOUTH EVERY DAY  0    cetirizine (ZYRTEC) 10 mg tablet Take 1 Tab by mouth daily. Indications: ALLERGIC RHINITIS 30 Tab 2     Allergies   Allergen Reactions    No Allergy Information Available Other (comments)     unresponsive    No Known Drug Allergies Unknown (comments)     NKDA per pt's      Past Medical History:   Diagnosis Date    Depression     Diabetes ([address]     Gastrointestinal disorder     gerd    High cholesterol     Obese     Other ill-defined conditions(799.89)     fibromyalgia    Psychiatric disorder     depression     Past Surgical History:   Procedure Laterality Date    DELIVERY       X 2    HX GYN      hysterectomy    HX SEPTOPLASTY       Family History   Problem Relation Age of Onset    Hypertension Mother     Heart Disease Father     Cancer Brother      Social History   Substance Use Topics    Smoking status: Former Smoker    Smokeless tobacco: Never Used    Alcohol use No      Comment: rarely         Review of Systems  Pertinent items are noted in HPI. Objective:     Visit Vitals    /65 (BP 1 Location: Left arm, BP Patient Position: Sitting)    Pulse 68    Temp 98.1 °F (36.7 °C) (Oral)    Resp 20    Ht 5' 6\" (1.676 m)    Wt 249 lb 12.8 oz (113.3 kg)    SpO2 97%    BMI 40.32 kg/m2     General:  alert, cooperative, no distress   Eyes: negative   Ears: normal TM's and external ear canals AU   Sinuses: Normal paranasal sinuses without tenderness   Mouth:  Lips, mucosa, and tongue normal. Teeth and gums normal and abnormal findings: mild oropharyngeal erythema and tonsillar hypertrophy 1+   Neck: supple, symmetrical, trachea midline and no adenopathy. Heart: S1 and S2 normal, no murmurs noted. Lungs: clear to auscultation bilaterally   Abdomen:         Results for orders placed or performed in visit on 10/11/17   AMB POC RAPID STREP A   Result Value Ref Range    VALID INTERNAL CONTROL POC Yes     Group A Strep Ag Negative Negative       Assessment/Plan:   tonsillitis      ICD-10-CM ICD-9-CM    1.  Tonsillitis J03.90 463 amoxicillin (AMOXIL) 875 mg tablet   2. Sore throat J02.9 462 AMB POC RAPID STREP A   .

## 2017-10-11 NOTE — MR AVS SNAPSHOT
Visit Information Date & Time Provider Department Dept. Phone Encounter #  
 [id number]/28/[id number] 56:50 AM Bruno Vizcaino MD [address] [place] [address] Road [phone] [id number] Upcoming Health Maintenance Date Due  
 EYE EXAM RETINAL OR DILATED Q1 8/17/1966 ZOSTER VACCINE AGE 60> 6/17/2016 INFLUENZA AGE 9 TO ADULT 8/1/2017 BREAST CANCER SCRN MAMMOGRAM 12/30/2017 Pneumococcal 19-64 Medium Risk (1 of 1 - PPSV23) 8/23/2021* HEMOGLOBIN A1C Q6M 12/26/2017 MICROALBUMIN Q1 6/26/2018 LIPID PANEL Q1 6/26/2018 FOOT EXAM Q1 7/3/2018 PAP AKA CERVICAL CYTOLOGY 11/19/2018 COLONOSCOPY 1/21/2019 DTaP/Tdap/Td series (2 - Td) 9/1/2023 *Topic was postponed. The date shown is not the original due date. Allergies as of 10/11/2017  Review Complete On: 00/96/6748 By: Bruno Vizcaino MD  
  
 Severity Noted Reaction Type Reactions No Allergy Information Available  09/04/2011    Other (comments)  
 unresponsive No Known Drug Allergies  09/04/2011    Unknown (comments) NKDA per pt's  Current Immunizations  Reviewed on 10/10/2016 Name Date Influenza Vaccine (Quad) 11/19/2015 11:47 AM  
 Influenza Vaccine (Quad) PF 10/10/2016, 1/28/2015 Tdap 9/1/2013  6:31 PM  
  
 Not reviewed this visit You Were Diagnosed With   
  
 Codes Comments Tonsillitis    -  Primary ICD-10-CM: J03.90 ICD-9-CM: 275 Sore throat     ICD-10-CM: J02.9 ICD-9-CM: 422 Vitals BP Pulse Temp Resp Height(growth percentile) Weight(growth percentile)  
 118/65 (BP 1 Location: Left arm, BP Patient Position: Sitting) 68 98.1 °F (36.7 °C) (Oral) 20 5' 6\" (1.676 m) 249 lb 12.8 oz (113.3 kg) SpO2 BMI OB Status Smoking Status 97% 40.32 kg/m2 Hysterectomy Former Smoker BMI and BSA Data Body Mass Index Body Surface Area  
 40.32 kg/m 2 2.3 m 2 Preferred Pharmacy Pharmacy Name Phone [facility]/PHARMACY #[id number] - [facility] - [address] [phone] Your Updated Medication List  
  
   
This list is accurate as of: 10/11/17 12:15 PM.  Always use your most recent med list. amLODIPine 5 mg tablet Commonly known as:  Chacon Fanti TAKE 1 TABLET BY MOUTH DAILY FOR PRINZMETAL ANGINA  
  
 amoxicillin 875 mg tablet Commonly known as:  AMOXIL Take 1 Tab by mouth two (2) times a day for 10 days. cetirizine 10 mg tablet Commonly known as:  ZYRTEC Take 1 Tab by mouth daily. Indications: ALLERGIC RHINITIS  
  
 doxepin 25 mg capsule Commonly known as:  SINEquan TAKE 1 CAPSULE BY MOUTH AT BEDTIME  
  
 escitalopram oxalate 20 mg tablet Commonly known as:  Georgiann Bares TAKE 2 TABLETS BY MOUTH DAILY  
  
 lamoTRIgine 100 mg tablet Commonly known as: LaMICtal  
Take 1 Tab by mouth daily. levothyroxine 175 mcg tablet Commonly known as:  SYNTHROID  
TAKE 1 TABLET BY MOUTH DAILY ( BEFORE BREAKFAST )  
  
 metFORMIN 500 mg tablet Commonly known as:  GLUCOPHAGE  
TAKE 1 TABLET BY MOUTH TWICE DAILY WITH MEALS  
  
 omeprazole 20 mg capsule Commonly known as:  PRILOSEC  
TAKE 1 CAPSULE BY MOUTH TWICE DAILY pravastatin 40 mg tablet Commonly known as:  PRAVACHOL  
TAKE 1 TABLET BY MOUTH EVERY NIGHT  
  
 pregabalin 150 mg capsule Commonly known as:  Caryn Tamaroa TAKE ONE CAPSULE BY MOUTH TWICE A DAY  Indications: FIBROMYALGIA  
  
 VYVANSE 60 mg capsule Generic drug:  Lisdexamfetamine TAKE ONE CAPSULE BY MOUTH EVERY DAY Prescriptions Sent to Pharmacy Refills  
 amoxicillin (AMOXIL) 875 mg tablet 0 Sig: Take 1 Tab by mouth two (2) times a day for 10 days. Class: Normal  
 Pharmacy: [facility]/pharmacy #[id number] - [place] - [address] Ph #: [phone] Route: Oral  
  
We Performed the Following AMB POC RAPID STREP A [09489 CPT(R)] Introducing [address]! Mary Velázquez introduces Versium patient portal. Now you can access parts of your medical record, email your doctor's office, and request medication refills online. 1. In your internet browser, go to [email or web address]. [email or web address] 2. Click on the First Time User? Click Here link in the Sign In box. You will see the New Member Sign Up page. 3. Enter your Versium Access Code exactly as it appears below. You will not need to use this code after youve completed the sign-up process. If you do not sign up before the expiration date, you must request a new code. · Versium Access Code: [email or web address] Expires: 1/9/2018 12:15 PM 
 
4. Enter the last four digits of your Social Security Number (xxxx) and Date of Birth (mm/dd/yyyy) as indicated and click Submit. You will be taken to the next sign-up page. 5. Create a Versium ID. This will be your Versium login ID and cannot be changed, so think of one that is secure and easy to remember. 6. Create a Versium password. You can change your password at any time. 7. Enter your Password Reset Question and Answer. This can be used at a later time if you forget your password. 8. Enter your e-mail address. You will receive e-mail notification when new information is available in [address] 9. Click Sign Up. You can now view and download portions of your medical record. 10. Click the Download Summary menu link to download a portable copy of your medical information. If you have questions, please visit the Frequently Asked Questions section of the Versium website. Remember, Versium is NOT to be used for urgent needs. For medical emergencies, dial 911. Now available from your iPhone and Android! Please provide this summary of care documentation to your next provider. Your primary care clinician is listed as Yue Leary. If you have any questions after today's visit, please call [phone].

## 2017-11-07 ENCOUNTER — OFFICE VISIT (OUTPATIENT)
Dept: FAMILY MEDICINE CLINIC | Age: 62
End: 2017-11-07

## 2017-11-07 VITALS
RESPIRATION RATE: 16 BRPM | WEIGHT: 249 LBS | DIASTOLIC BLOOD PRESSURE: 65 MMHG | TEMPERATURE: 98 F | HEIGHT: 66 IN | BODY MASS INDEX: 40.02 KG/M2 | OXYGEN SATURATION: 99 % | SYSTOLIC BLOOD PRESSURE: 143 MMHG | HEART RATE: 69 BPM

## 2017-11-07 DIAGNOSIS — J06.9 URI WITH COUGH AND CONGESTION: Primary | ICD-10-CM

## 2017-11-07 DIAGNOSIS — Z23 ENCOUNTER FOR IMMUNIZATION: ICD-10-CM

## 2017-11-07 DIAGNOSIS — M79.7 FIBROMYALGIA: Chronic | ICD-10-CM

## 2017-11-07 RX ORDER — PREGABALIN 150 MG/1
CAPSULE ORAL
Qty: 60 CAP | Refills: 0 | Status: SHIPPED | OUTPATIENT
Start: 2017-11-07 | End: 2017-12-20 | Stop reason: SDUPTHER

## 2017-11-07 RX ORDER — CEFDINIR 300 MG/1
300 CAPSULE ORAL 2 TIMES DAILY
Qty: 20 CAP | Refills: 0 | Status: SHIPPED | OUTPATIENT
Start: 2017-11-07 | End: 2017-11-17

## 2017-11-07 RX ORDER — BENZONATATE 100 MG/1
100 CAPSULE ORAL
Qty: 21 CAP | Refills: 0 | Status: SHIPPED | OUTPATIENT
Start: 2017-11-07 | End: 2017-11-14

## 2017-11-07 NOTE — PROGRESS NOTES
Identified pt with two pt identifiers(name and ). Chief Complaint   Patient presents with    Cough     been goign on about a week now,    Sore Throat    Nasal Congestion        Health Maintenance Due   Topic    EYE EXAM RETINAL OR DILATED Q1     ZOSTER VACCINE AGE 60>     Influenza Age 5 to Adult     BREAST CANCER SCRN MAMMOGRAM        Wt Readings from Last 3 Encounters:   17 249 lb (112.9 kg)   10/11/17 249 lb 12.8 oz (113.3 kg)   17 251 lb (113.9 kg)     Temp Readings from Last 3 Encounters:   17 98 °F (36.7 °C) (Oral)   10/11/17 98.1 °F (36.7 °C) (Oral)   17 97.9 °F (36.6 °C) (Oral)     BP Readings from Last 3 Encounters:   17 143/65   10/11/17 118/65   17 134/68     Pulse Readings from Last 3 Encounters:   17 69   10/11/17 68   17 68         Learning Assessment:  :     Learning Assessment 2014   PRIMARY LEARNER Patient   HIGHEST LEVEL OF EDUCATION - PRIMARY LEARNER  DID NOT GRADUATE HIGH SCHOOL   BARRIERS PRIMARY LEARNER NONE   CO-LEARNER CAREGIVER No   PRIMARY LANGUAGE ENGLISH   LEARNER PREFERENCE PRIMARY OTHER (COMMENT)   ANSWERED BY patient   RELATIONSHIP SELF       Depression Screening:  :     PHQ over the last two weeks 10/11/2017   PHQ Not Done Active Diagnosis of Depression or Bipolar Disorder   Little interest or pleasure in doing things -   Feeling down, depressed or hopeless -   Total Score PHQ 2 -       Fall Risk Assessment:  :     Fall Risk Assessment, last 12 mths 10/11/2017   Able to walk? Yes   Fall in past 12 months? No       Abuse Screening:  :     Abuse Screening Questionnaire 10/11/2017 10/10/2016 2015   Do you ever feel afraid of your partner? N N N   Are you in a relationship with someone who physically or mentally threatens you? N N N   Is it safe for you to go home?  Rivera Mae       Coordination of Care Questionnaire:  :     1) Have you been to an emergency room, urgent care clinic since your last visit? no   Hospitalized

## 2017-11-07 NOTE — PATIENT INSTRUCTIONS
Upper Respiratory Infection (Cold): Care Instructions  Your Care Instructions    An upper respiratory infection, or URI, is an infection of the nose, sinuses, or throat. URIs are spread by coughs, sneezes, and direct contact. The common cold is the most frequent kind of URI. The flu and sinus infections are other kinds of URIs. Almost all URIs are caused by viruses. Antibiotics won't cure them. But you can treat most infections with home care. This may include drinking lots of fluids and taking over-the-counter pain medicine. You will probably feel better in 4 to 10 days. The doctor has checked you carefully, but problems can develop later. If you notice any problems or new symptoms, get medical treatment right away. Follow-up care is a key part of your treatment and safety. Be sure to make and go to all appointments, and call your doctor if you are having problems. It's also a good idea to know your test results and keep a list of the medicines you take. How can you care for yourself at home? · To prevent dehydration, drink plenty of fluids, enough so that your urine is light yellow or clear like water. Choose water and other caffeine-free clear liquids until you feel better. If you have kidney, heart, or liver disease and have to limit fluids, talk with your doctor before you increase the amount of fluids you drink. · Take an over-the-counter pain medicine, such as acetaminophen (Tylenol), ibuprofen (Advil, Motrin), or naproxen (Aleve). Read and follow all instructions on the label. · Before you use cough and cold medicines, check the label. These medicines may not be safe for young children or for people with certain health problems. · Be careful when taking over-the-counter cold or flu medicines and Tylenol at the same time. Many of these medicines have acetaminophen, which is Tylenol. Read the labels to make sure that you are not taking more than the recommended dose.  Too much acetaminophen (Tylenol) can be harmful. · Get plenty of rest.  · Do not smoke or allow others to smoke around you. If you need help quitting, talk to your doctor about stop-smoking programs and medicines. These can increase your chances of quitting for good. When should you call for help? Call 911 anytime you think you may need emergency care. For example, call if:  ? · You have severe trouble breathing. ?Call your doctor now or seek immediate medical care if:  ? · You seem to be getting much sicker. ? · You have new or worse trouble breathing. ? · You have a new or higher fever. ? · You have a new rash. ? Watch closely for changes in your health, and be sure to contact your doctor if:  ? · You have a new symptom, such as a sore throat, an earache, or sinus pain. ? · You cough more deeply or more often, especially if you notice more mucus or a change in the color of your mucus. ? · You do not get better as expected. Where can you learn more? Go to http://kelli-carl.info/. Enter G132 in the search box to learn more about \"Upper Respiratory Infection (Cold): Care Instructions. \"  Current as of: May 12, 2017  Content Version: 11.4  © 3103-0685 [employer]. Care instructions adapted under license by [employer] (which disclaims liability or warranty for this information). If you have questions about a medical condition or this instruction, always ask your healthcare professional. [place] [address] any warranty or liability for your use of this information. Influenza (Flu) Vaccine (Inactivated or Recombinant): What You Need to Know  Why get vaccinated? Influenza (\"flu\") is a contagious disease that spreads around the [place] every winter, usually between October and May. Flu is caused by influenza viruses and is spread mainly by coughing, sneezing, and close contact. Anyone can get flu. Flu strikes suddenly and can last several days. Symptoms vary by age, but can include:  · Fever/chills. · Sore throat. · Muscle aches. · Fatigue. · Cough. · Headache. · Runny or stuffy nose. Flu can also lead to pneumonia and blood infections, and cause diarrhea and seizures in children. If you have a medical condition, such as heart or lung disease, flu can make it worse. Flu is more dangerous for some people. Infants and young children, people 72years of age and older, pregnant women, and people with certain health conditions or a weakened immune system are at greatest risk. Each year thousands of people in the [facility] die from flu, and many more are hospitalized. Flu vaccine can:  · Keep you from getting flu. · Make flu less severe if you do get it. · Keep you from spreading flu to your family and other people. Inactivated and recombinant flu vaccines  A dose of flu vaccine is recommended every flu season. Children 6 months through 6years of age may need two doses during the same flu season. Everyone else needs only one dose each flu season. Some inactivated flu vaccines contain a very small amount of a mercury-based preservative called thimerosal. Studies have not shown thimerosal in vaccines to be harmful, but flu vaccines that do not contain thimerosal are available. There is no live flu virus in flu shots. They cannot cause the flu. There are many flu viruses, and they are always changing. Each year a new flu vaccine is made to protect against three or four viruses that are likely to cause disease in the upcoming flu season. But even when the vaccine doesn't exactly match these viruses, it may still provide some protection. Flu vaccine cannot prevent:  · Flu that is caused by a virus not covered by the vaccine. · Illnesses that look like flu but are not. Some people should not get this vaccine  Tell the person who is giving you the vaccine:  · If you have any severe (life-threatening) allergies.  If you ever had a life-threatening allergic reaction after a dose of flu vaccine, or have a severe allergy to any part of this vaccine, you may be advised not to get vaccinated. Most, but not all, types of flu vaccine contain a small amount of egg protein. · If you ever had Guillain-Barré syndrome (also called GBS) Some people with a history of GBS should not get this vaccine. This should be discussed with your doctor. · If you are not feeling well. It is usually okay to get flu vaccine when you have a mild illness, but you might be asked to come back when you feel better. Risks of a vaccine reaction  With any medicine, including vaccines, there is a chance of reactions. These are usually mild and go away on their own, but serious reactions are also possible. Most people who get a flu shot do not have any problems with it. Minor problems following a flu shot include:  · Soreness, redness, or swelling where the shot was given  · Hoarseness  · Sore, red or itchy eyes  · Cough  · Fever  · Aches  · Headache  · Itching  · Fatigue  If these problems occur, they usually begin soon after the shot and last 1 or 2 days. More serious problems following a flu shot can include the following:  · There may be a small increased risk of Guillain-Barré Syndrome (GBS) after inactivated flu vaccine. This risk has been estimated at 1 or 2 additional cases per million people vaccinated. This is much lower than the risk of severe complications from flu, which can be prevented by flu vaccine. · Joretta Foil children who get the flu shot along with pneumococcal vaccine (PCV13) and/or DTaP vaccine at the same time might be slightly more likely to have a seizure caused by fever. Ask your doctor for more information. Tell your doctor if a child who is getting flu vaccine has ever had a seizure  Problems that could happen after any injected vaccine:  · People sometimes faint after a medical procedure, including vaccination.  Sitting or lying down for about 15 minutes can help prevent fainting, and injuries caused by a fall. Tell your doctor if you feel dizzy, or have vision changes or ringing in the ears. · Some people get severe pain in the shoulder and have difficulty moving the arm where a shot was given. This happens very rarely. · Any medication can cause a severe allergic reaction. Such reactions from a vaccine are very rare, estimated at about 1 in a million doses, and would happen within a few minutes to a few hours after the vaccination. As with any medicine, there is a very remote chance of a vaccine causing a serious injury or death. The safety of vaccines is always being monitored. For more information, visit: www.cdc.gov/vaccinesafety/. What if there is a serious reaction? What should I look for? · Look for anything that concerns you, such as signs of a severe allergic reaction, very high fever, or unusual behavior. Signs of a severe allergic reaction can include hives, swelling of the face and throat, difficulty breathing, a fast heartbeat, dizziness, and weakness - usually within a few minutes to a few hours after the vaccination. What should I do? · If you think it is a severe allergic reaction or other emergency that can't wait, call 9-1-1 and get the person to the nearest hospital. Otherwise, call your doctor. · Reactions should be reported to the \"Vaccine Adverse Event Reporting System\" (VAERS). Your doctor should file this report, or you can do it yourself through the VAERS website at www.vaers. hhs.gov, or by calling 5-943.960.3108. VAERS does not give medical advice. The National Vaccine Injury Compensation Program  The National Vaccine Injury Compensation Program (VICP) is a federal program that was created to compensate people who may have been injured by certain vaccines.   Persons who believe they may have been injured by a vaccine can learn about the program and about filing a claim by calling 7-399.217.2228 or visiting the [facility] website at www.hrsa.gov/vaccinecompensation. There is a time limit to file a claim for compensation. How can I learn more? · Ask your healthcare provider. He or she can give you the vaccine package insert or suggest other sources of information. · Call your local or state health department. · Contact the Centers for Disease Control and Prevention (CDC):  ¨ Call 9-132.104.5918 (1-800-CDC-INFO) or  ¨ Visit CDC's website at www.cdc.gov/flu  Vaccine Information Statement  Inactivated Influenza Vaccine  8/7/2015)  42 ARENLuis Alberto Kirk Bren 256AE-18  Department of Health and Human Services  Centers for Disease Control and Prevention  Many Vaccine Information Statements are available in Georgian and other languages. See www.immunize.org/vis. Muchas hojas de información sobre vacunas están disponibles en español y en otros idiomas. Visite www.immunize.org/vis. Care instructions adapted under license by [employer] (which disclaims liability or warranty for this information). If you have questions about a medical condition or this instruction, always ask your healthcare professional. [address] any warranty or liability for your use of this information.

## 2017-11-07 NOTE — MR AVS SNAPSHOT
Visit Information Date & Time Provider Department Dept. Phone Encounter #  
 11/7/2017  2:00 PM Jordon WhitleyJaya [phone] [id number] Follow-up Instructions Return if symptoms worsen or fail to improve. Upcoming Health Maintenance Date Due  
 EYE EXAM RETINAL OR DILATED Q1 8/17/1966 ZOSTER VACCINE AGE 60> 6/17/2016 Influenza Age 5 to Adult 8/1/2017 BREAST CANCER SCRN MAMMOGRAM 12/30/2017 Pneumococcal 19-64 Medium Risk (1 of 1 - PPSV23) 8/23/2021* HEMOGLOBIN A1C Q6M 12/26/2017 MICROALBUMIN Q1 6/26/2018 LIPID PANEL Q1 6/26/2018 FOOT EXAM Q1 7/3/2018 PAP AKA CERVICAL CYTOLOGY 11/19/2018 COLONOSCOPY 1/21/2019 DTaP/Tdap/Td series (2 - Td) 9/1/2023 *Topic was postponed. The date shown is not the original due date. Allergies as of 11/7/2017  Review Complete On: 11/7/2017 By: Jordon Whitley MD  
  
 Severity Noted Reaction Type Reactions No Allergy Information Available  09/04/2011    Other (comments)  
 unresponsive No Known Drug Allergies  09/04/2011    Unknown (comments) NKDA per pt's  Current Immunizations  Reviewed on 10/10/2016 Name Date Influenza Vaccine (Quad) 11/19/2015 11:47 AM  
 Influenza Vaccine (Quad) PF  Incomplete, 10/10/2016, 1/28/2015 Tdap 9/1/2013  6:31 PM  
  
 Not reviewed this visit You Were Diagnosed With   
  
 Codes Comments URI with cough and congestion    -  Primary ICD-10-CM: J06.9 ICD-9-CM: 465.9 Fibromyalgia     ICD-10-CM: M79.7 ICD-9-CM: 729.1 Encounter for immunization     ICD-10-CM: A82 ICD-9-CM: V03.89 Vitals BP Pulse Temp Resp Height(growth percentile) Weight(growth percentile) 143/65 (BP 1 Location: Right arm, BP Patient Position: Sitting) 69 98 °F (36.7 °C) (Oral) 16 5' 6\" (1.676 m) 249 lb (112.9 kg) SpO2 BMI OB Status Smoking Status 99% 40.19 kg/m2 Hysterectomy Former Smoker Vitals History BMI and BSA Data Body Mass Index Body Surface Area  
 40.19 kg/m 2 2.29 m 2 Preferred Pharmacy Pharmacy Name Phone [facility]/PHARMACY #[id number] Jigar Goddardy - [phone] [phone] Your Updated Medication List  
  
   
This list is accurate as of: 11/7/17  2:50 PM.  Always use your most recent med list. amLODIPine 5 mg tablet Commonly known as:  Kinga Colon TAKE 1 TABLET BY MOUTH DAILY FOR PRINZMETAL ANGINA  
  
 benzonatate 100 mg capsule Commonly known as:  TESSALON Take 1 Cap by mouth three (3) times daily as needed for Cough for up to 7 days. cefdinir 300 mg capsule Commonly known as:  OMNICEF Take 1 Cap by mouth two (2) times a day for 10 days. cetirizine 10 mg tablet Commonly known as:  ZYRTEC Take 1 Tab by mouth daily. Indications: ALLERGIC RHINITIS  
  
 doxepin 25 mg capsule Commonly known as:  SINEquan TAKE 1 CAPSULE BY MOUTH AT BEDTIME  
  
 escitalopram oxalate 20 mg tablet Commonly known as:  Jung Hands TAKE 2 TABLETS BY MOUTH DAILY  
  
 lamoTRIgine 100 mg tablet Commonly known as: LaMICtal  
Take 1 Tab by mouth daily. levothyroxine 175 mcg tablet Commonly known as:  SYNTHROID  
TAKE 1 TABLET BY MOUTH DAILY ( BEFORE BREAKFAST )  
  
 metFORMIN 500 mg tablet Commonly known as:  GLUCOPHAGE  
TAKE 1 TABLET BY MOUTH TWICE DAILY WITH MEALS  
  
 omeprazole 20 mg capsule Commonly known as:  PRILOSEC  
TAKE 1 CAPSULE BY MOUTH TWICE DAILY pravastatin 40 mg tablet Commonly known as:  PRAVACHOL  
TAKE 1 TABLET BY MOUTH EVERY NIGHT  
  
 pregabalin 150 mg capsule Commonly known as:  Larey Nikhil TAKE ONE CAPSULE BY MOUTH TWICE A DAY  Indications: FIBROMYALGIA  
  
 VYVANSE 60 mg capsule Generic drug:  Lisdexamfetamine TAKE ONE CAPSULE BY MOUTH EVERY DAY Prescriptions Printed Refills  
 pregabalin (LYRICA) 150 mg capsule 0  Sig: TAKE ONE CAPSULE BY MOUTH TWICE A DAY  Indications: FIBROMYALGIA  
 Class: Print Prescriptions Sent to Pharmacy Refills  
 cefdinir (OMNICEF) 300 mg capsule 0 Sig: Take 1 Cap by mouth two (2) times a day for 10 days. Class: Normal  
 Pharmacy: [facility]/pharmacy [place][id number] [place] [address] Ph #: [phone] Route: Oral  
 benzonatate (TESSALON) 100 mg capsule 0 Sig: Take 1 Cap by mouth three (3) times daily as needed for Cough for up to 7 days. Class: Normal  
 [id number] [place] [address] Ph #: [phone] Route: Oral  
  
We Performed the Following INFLUENZA VIRUS VAC QUAD,SPLIT,PRESV FREE SYRINGE IM [id number] CPT(R)] IN IMMUNIZ ADMIN,1 SINGLE/COMB VAC/TOXOID [id number] CPT(R)] Follow-up Instructions Return if symptoms worsen or fail to improve. Patient Instructions Upper Respiratory Infection (Cold): Care Instructions Your Care Instructions An upper respiratory infection, or URI, is an infection of the nose, sinuses, or throat. URIs are spread by coughs, sneezes, and direct contact. The common cold is the most frequent kind of URI. The flu and sinus infections are other kinds of URIs. Almost all URIs are caused by viruses. Antibiotics won't cure them. But you can treat most infections with home care. This may include drinking lots of fluids and taking over-the-counter pain medicine. You will probably feel better in 4 to 10 days. The doctor has checked you carefully, but problems can develop later. If you notice any problems or new symptoms, get medical treatment right away. Follow-up care is a key part of your treatment and safety. Be sure to make and go to all appointments, and call your doctor if you are having problems. It's also a good idea to know your test results and keep a list of the medicines you take. How can you care for yourself at home?  
· To prevent dehydration, drink plenty of fluids, enough so that your urine is light yellow or clear like water. Choose water and other caffeine-free clear liquids until you feel better. If you have kidney, heart, or liver disease and have to limit fluids, talk with your doctor before you increase the amount of fluids you drink. · Take an over-the-counter pain medicine, such as acetaminophen (Tylenol), ibuprofen (Advil, Motrin), or naproxen (Aleve). Read and follow all instructions on the label. · Before you use cough and cold medicines, check the label. These medicines may not be safe for young children or for people with certain health problems. · Be careful when taking over-the-counter cold or flu medicines and Tylenol at the same time. Many of these medicines have acetaminophen, which is Tylenol. Read the labels to make sure that you are not taking more than the recommended dose. Too much acetaminophen (Tylenol) can be harmful. · Get plenty of rest. 
· Do not smoke or allow others to smoke around you. If you need help quitting, talk to your doctor about stop-smoking programs and medicines. These can increase your chances of quitting for good. When should you call for help? Call 911 anytime you think you may need emergency care. For example, call if: 
? · You have severe trouble breathing. ?Call your doctor now or seek immediate medical care if: 
? · You seem to be getting much sicker. ? · You have new or worse trouble breathing. ? · You have a new or higher fever. ? · You have a new rash. ? Watch closely for changes in your health, and be sure to contact your doctor if: 
? · You have a new symptom, such as a sore throat, an earache, or sinus pain. ? · You cough more deeply or more often, especially if you notice more mucus or a change in the color of your mucus. ? · You do not get better as expected. Where can you learn more? Go to http://kelli-carl.info/.  
Enter W921 in the search box to learn more about \"Upper Respiratory Infection (Cold): Care Instructions. \" Current as of: May 12, 2017 Content Version: 11.4 © 5249-3819 [employer]. Care instructions adapted under license by [employer] (which disclaims liability or warranty for this information). If you have questions about a medical condition or this instruction, always ask your healthcare professional. [address]  any warranty or liability for your use of this information. Influenza (Flu) Vaccine (Inactivated or Recombinant): What You Need to Know Why get vaccinated? Influenza (\"flu\") is a contagious disease that spreads around the [place] every winter, usually between October and May. Flu is caused by influenza viruses and is spread mainly by coughing, sneezing, and close contact. Anyone can get flu. Flu strikes suddenly and can last several days. Symptoms vary by age, but can include: · Fever/chills. · Sore throat. · Muscle aches. · Fatigue. · Cough. · Headache. · Runny or stuffy nose. Flu can also lead to pneumonia and blood infections, and cause diarrhea and seizures in children. If you have a medical condition, such as heart or lung disease, flu can make it worse. Flu is more dangerous for some people. Infants and young children, people 72years of age and older, pregnant women, and people with certain health conditions or a weakened immune system are at greatest risk. Each year thousands of people in the [facility] die from flu, and many more are hospitalized. Flu vaccine can: · Keep you from getting flu. · Make flu less severe if you do get it. · Keep you from spreading flu to your family and other people. Inactivated and recombinant flu vaccines A dose of flu vaccine is recommended every flu season. Children 6 months through 6years of age may need two doses during the same flu season. Everyone else needs only one dose each flu season. Some inactivated flu vaccines contain a very small amount of a mercury-based preservative called thimerosal. Studies have not shown thimerosal in vaccines to be harmful, but flu vaccines that do not contain thimerosal are available. There is no live flu virus in flu shots. They cannot cause the flu. There are many flu viruses, and they are always changing. Each year a new flu vaccine is made to protect against three or four viruses that are likely to cause disease in the upcoming flu season. But even when the vaccine doesn't exactly match these viruses, it may still provide some protection. Flu vaccine cannot prevent: · Flu that is caused by a virus not covered by the vaccine. · Illnesses that look like flu but are not. Some people should not get this vaccine Tell the person who is giving you the vaccine: · If you have any severe (life-threatening) allergies. If you ever had a life-threatening allergic reaction after a dose of flu vaccine, or have a severe allergy to any part of this vaccine, you may be advised not to get vaccinated. Most, but not all, types of flu vaccine contain a small amount of egg protein. · If you ever had Guillain-Barré syndrome (also called GBS) Some people with a history of GBS should not get this vaccine. This should be discussed with your doctor. · If you are not feeling well. It is usually okay to get flu vaccine when you have a mild illness, but you might be asked to come back when you feel better. Risks of a vaccine reaction With any medicine, including vaccines, there is a chance of reactions. These are usually mild and go away on their own, but serious reactions are also possible. Most people who get a flu shot do not have any problems with it. Minor problems following a flu shot include: · Soreness, redness, or swelling where the shot was given · Hoarseness · Sore, red or itchy eyes · Cough · Fever · Aches · Headache · Itching · Fatigue If these problems occur, they usually begin soon after the shot and last 1 or 2 days. More serious problems following a flu shot can include the following: · There may be a small increased risk of Guillain-Barré Syndrome (GBS) after inactivated flu vaccine. This risk has been estimated at 1 or 2 additional cases per million people vaccinated. This is much lower than the risk of severe complications from flu, which can be prevented by flu vaccine. · Terry Laureano children who get the flu shot along with pneumococcal vaccine (PCV13) and/or DTaP vaccine at the same time might be slightly more likely to have a seizure caused by fever. Ask your doctor for more information. Tell your doctor if a child who is getting flu vaccine has ever had a seizure Problems that could happen after any injected vaccine: · People sometimes faint after a medical procedure, including vaccination. Sitting or lying down for about 15 minutes can help prevent fainting, and injuries caused by a fall. Tell your doctor if you feel dizzy, or have vision changes or ringing in the ears. · Some people get severe pain in the shoulder and have difficulty moving the arm where a shot was given. This happens very rarely. · Any medication can cause a severe allergic reaction. Such reactions from a vaccine are very rare, estimated at about 1 in a million doses, and would happen within a few minutes to a few hours after the vaccination. As with any medicine, there is a very remote chance of a vaccine causing a serious injury or death. The safety of vaccines is always being monitored. For more information, visit: www.cdc.gov/vaccinesafety/. What if there is a serious reaction? What should I look for? · Look for anything that concerns you, such as signs of a severe allergic reaction, very high fever, or unusual behavior.  
Signs of a severe allergic reaction can include hives, swelling of the face and throat, difficulty breathing, a fast heartbeat, dizziness, and weakness - usually within a few minutes to a few hours after the vaccination. What should I do? · If you think it is a severe allergic reaction or other emergency that can't wait, call 9-1-1 and get the person to the nearest hospital. Otherwise, call your doctor. · Reactions should be reported to the \"Vaccine Adverse Event Reporting System\" (VAERS). Your doctor should file this report, or you can do it yourself through the VAERS website at www.vaers. [facility].gov, or by calling 2-108.485.6554. VAERS does not give medical advice. The National Vaccine Injury Compensation Program 
The National Vaccine Injury Compensation Program (VICP) is a federal program that was created to compensate people who may have been injured by certain vaccines. Persons who believe they may have been injured by a vaccine can learn about the program and about filing a claim by calling 0-318.793.3985 or visiting the [employer] website at www.[facility].gov/vaccinecompensation. There is a time limit to file a claim for compensation. How can I learn more? · Ask your healthcare provider. He or she can give you the vaccine package insert or suggest other sources of information. · Call your local or state health department. · Contact the Centers for Disease Control and Prevention (CDC): 
¨ Call 9-984.600.8286 (1-800-CDC-INFO) or ¨ Visit CDC's website at www.cdc.gov/flu Vaccine Information Statement Inactivated Influenza Vaccine 8/7/2015) 42 DARLEEN Andrews 483TO-38 [facility] and [employer] Centers for Disease Control and Prevention Many Vaccine Information Statements are available in Portuguese and other languages. See www.immunize.org/vis. Muchas hojas de información sobre vacunas están disponibles en español y en otros idiomas. Visite www.immunize.org/vis.  
Care instructions adapted under license by [employer] (which disclaims liability or warranty for this information). If you have questions about a medical condition or this instruction, always ask your healthcare professional. [facility] [address] any warranty or liability for your use of this information. Introducing [facility]! [facility] introduces Magin patient portal. Now you can access parts of your medical record, email your doctor's office, and request medication refills online. 1. In your internet browser, go to https://[employer]. [employer] 2. Click on the First Time User? Click Here link in the Sign In box. You will see the New Member Sign Up page. 3. Enter your Magin Access Code exactly as it appears below. You will not need to use this code after youve completed the sign-up process. If you do not sign up before the expiration date, you must request a new code. · Magin Access Code: [email or web address] Expires: 1/9/2018 11:15 AM 
 
4. Enter the last four digits of your Social Security Number (xxxx) and Date of Birth (mm/dd/yyyy) as indicated and click Submit. You will be taken to the next sign-up page. 5. Create a Magin ID. This will be your Magin login ID and cannot be changed, so think of one that is secure and easy to remember. 6. Create a Magin password. You can change your password at any time. 7. Enter your Password Reset Question and Answer. This can be used at a later time if you forget your password. 8. Enter your e-mail address. You will receive e-mail notification when new information is available in [address] 9. Click Sign Up. You can now view and download portions of your medical record. 10. Click the Download Summary menu link to download a portable copy of your medical information. If you have questions, please visit the Frequently Asked Questions section of the Magin website. Remember, Magin is NOT to be used for urgent needs. For medical emergencies, dial 911. Now available from your iPhone and Android! Please provide this summary of care documentation to your next provider. Your primary care clinician is listed as Юлия Isabel. If you have any questions after today's visit, please call [phone].

## 2017-11-07 NOTE — PROGRESS NOTES
Subjective:      Raymundo Hanna is a 65 y.o. female here with complaint of head congestion, sore throat, postnasal drip, and hot and cold spells have gotten better. Onset was 7 days ago and has gradually worsened. She now reports \"I just don't feel good\". Reports hacking nonproductive cough that started approximately 3 days ago. Positive sick contacts - her grandson who just started with these symptoms. Evaluation to date: none  Treatment to date: Tylenol, Mucinex, Sudafed     Current Outpatient Prescriptions   Medication Sig Dispense Refill    pregabalin (LYRICA) 150 mg capsule TAKE ONE CAPSULE BY MOUTH TWICE A DAY  Indications: FIBROMYALGIA 20 Cap 0    amLODIPine (NORVASC) 5 mg tablet TAKE 1 TABLET BY MOUTH DAILY FOR PRINZMETAL ANGINA 90 Tab 3    doxepin (SINEQUAN) 25 mg capsule TAKE 1 CAPSULE BY MOUTH AT BEDTIME 90 Cap 3    escitalopram oxalate (LEXAPRO) 20 mg tablet TAKE 2 TABLETS BY MOUTH DAILY 180 Tab 3    lamoTRIgine (LAMICTAL) 100 mg tablet Take 1 Tab by mouth daily. 90 Tab 3    levothyroxine (SYNTHROID) 175 mcg tablet TAKE 1 TABLET BY MOUTH DAILY ( BEFORE BREAKFAST ) 90 Tab 3    metFORMIN (GLUCOPHAGE) 500 mg tablet TAKE 1 TABLET BY MOUTH TWICE DAILY WITH MEALS 180 Tab 3    omeprazole (PRILOSEC) 20 mg capsule TAKE 1 CAPSULE BY MOUTH TWICE DAILY 180 Cap 3    pravastatin (PRAVACHOL) 40 mg tablet TAKE 1 TABLET BY MOUTH EVERY NIGHT 90 Tab 3    cetirizine (ZYRTEC) 10 mg tablet Take 1 Tab by mouth daily.  Indications: ALLERGIC RHINITIS 30 Tab 2    VYVANSE 60 mg capsule TAKE ONE CAPSULE BY MOUTH EVERY DAY  0       Allergies   Allergen Reactions    No Allergy Information Available Other (comments)     unresponsive    No Known Drug Allergies Unknown (comments)     NKDA per pt's        Past Medical History:   Diagnosis Date    Depression     Diabetes ([facility] [address]     Gastrointestinal disorder     gerd    High cholesterol     Obese     Other ill-defined conditions(799.89)     fibromyalgia    Psychiatric disorder     depression       Social History   Substance Use Topics    Smoking status: Former Smoker    Smokeless tobacco: Never Used    Alcohol use No      Comment: rarely         Review of Systems  Pertinent items are noted in HPI. Objective:     Visit Vitals    /65 (BP 1 Location: Right arm, BP Patient Position: Sitting)  Comment: auto cuff    Pulse 69    Temp 98 °F (36.7 °C) (Oral)  Comment: .  Resp 16    Ht 5' 6\" (1.676 m)    Wt 249 lb (112.9 kg)    SpO2 99%    BMI 40.19 kg/m2      General appearance - alert, well appearing, and in no distress  Eyes - pupils equal and reactive, extraocular eye movements intact, sclera anicteric  Ears - bilateral TM's and external ear canals normal  Nose - mucosal congestion and mucosal erythema  Oropharyngx - mucous membranes moist, pharynx normal without lesions and erythematous  Neck - supple, no significant adenopathy  Chest - clear to auscultation, no wheezes, rales or rhonchi, symmetric air entry, no tachypnea, retractions or cyanosis  Heart - normal rate, regular rhythm, normal S1, S2, no murmurs, rubs, clicks or gallops    Assessment/Plan:   Ayaan Garcia is a 65 y.o. female seen for:     1. URI with cough and congestion  - cefdinir (OMNICEF) 300 mg capsule; Take 1 Cap by mouth two (2) times a day for 10 days. Dispense: 20 Cap; Refill: 0  - benzonatate (TESSALON) 100 mg capsule; Take 1 Cap by mouth three (3) times daily as needed for Cough for up to 7 days. Dispense: 21 Cap; Refill: 0  - Symptomatic therapy suggested: push fluids, rest and use cough suppressant of choice prn.    2. Fibromyalgia: she is awaiting her prescription from her mail order pharmacy. Short term prescription sent to her local pharmacy. - pregabalin (LYRICA) 150 mg capsule; TAKE ONE CAPSULE BY MOUTH TWICE A DAY  Indications: FIBROMYALGIA  Dispense: 60 Cap; Refill: 0    3. Encounter for immunization: Influenza vaccine administered, VIS provided.    - INFLUENZA VIRUS VACCINE QUADRIVALENT, PRESERVATIVE FREE SYRINGE (75152)  - DE IMMUNIZ ADMIN,1 SINGLE/COMB VAC/TOXOID    I have discussed the diagnosis with the patient and the intended plan as seen in the above orders. The patient has received an after-visit summary and questions were answered concerning future plans. I have discussed medication side effects and warnings with the patient as well. Patient verbalizes understanding of plan of care and denies further questions or concerns at this time. Informed patient to return to the office if symptoms worsen or if new symptoms arise. Follow-up Disposition:  Return if symptoms worsen or fail to improve.

## 2017-11-13 ENCOUNTER — OFFICE VISIT (OUTPATIENT)
Dept: FAMILY MEDICINE CLINIC | Age: 62
End: 2017-11-13

## 2017-11-13 VITALS
SYSTOLIC BLOOD PRESSURE: 114 MMHG | BODY MASS INDEX: 39.05 KG/M2 | TEMPERATURE: 97.8 F | RESPIRATION RATE: 18 BRPM | DIASTOLIC BLOOD PRESSURE: 74 MMHG | HEART RATE: 90 BPM | WEIGHT: 243 LBS | HEIGHT: 66 IN | OXYGEN SATURATION: 100 %

## 2017-11-13 DIAGNOSIS — R42 DIZZINESS: Primary | ICD-10-CM

## 2017-11-13 NOTE — PROGRESS NOTES
Identified pt with two pt identifiers(name and ). Chief Complaint   Patient presents with    Follow-up     since last visit URI has cleared but now has Dizziness and cold clammy sweats        Health Maintenance Due   Topic    EYE EXAM RETINAL OR DILATED Q1     ZOSTER VACCINE AGE 60>     BREAST CANCER SCRN MAMMOGRAM        Wt Readings from Last 3 Encounters:   17 243 lb (110.2 kg)   17 249 lb (112.9 kg)   10/11/17 249 lb 12.8 oz (113.3 kg)     Temp Readings from Last 3 Encounters:   17 97.8 °F (36.6 °C) (Oral)   17 98 °F (36.7 °C) (Oral)   10/11/17 98.1 °F (36.7 °C) (Oral)     BP Readings from Last 3 Encounters:   17 148/81   17 143/65   10/11/17 118/65     Pulse Readings from Last 3 Encounters:   17 90   17 69   10/11/17 68         Learning Assessment:  :     Learning Assessment 2014   PRIMARY LEARNER Patient   HIGHEST LEVEL OF EDUCATION - PRIMARY LEARNER  DID NOT GRADUATE HIGH SCHOOL   BARRIERS PRIMARY LEARNER NONE   CO-LEARNER CAREGIVER No   PRIMARY LANGUAGE ENGLISH   LEARNER PREFERENCE PRIMARY OTHER (COMMENT)   ANSWERED BY patient   RELATIONSHIP SELF       Depression Screening:  :     PHQ over the last two weeks 10/11/2017   PHQ Not Done Active Diagnosis of Depression or Bipolar Disorder   Little interest or pleasure in doing things -   Feeling down, depressed or hopeless -   Total Score PHQ 2 -       Fall Risk Assessment:  :     Fall Risk Assessment, last 12 mths 10/11/2017   Able to walk? Yes   Fall in past 12 months? No       Abuse Screening:  :     Abuse Screening Questionnaire 10/11/2017 10/10/2016 2015   Do you ever feel afraid of your partner? N N N   Are you in a relationship with someone who physically or mentally threatens you? N N N   Is it safe for you to go home?  Leona Thrasher       Coordination of Care Questionnaire:  :     1) Have you been to an emergency room, urgent care clinic since your last visit? no   Hospitalized since your last visit? no             2) Have you seen or consulted any other health care providers outside of [address] [place] [address] since your last visit? no  (Include any pap smears or colon screenings in this section.)    3) Do you have an Advance Directive on file? no  Are you interested in receiving information about Advance Directives? no    Reviewed record in preparation for visit and have obtained necessary documentation. Medication reconciliation up to date and corrected with patient at this time.

## 2017-11-13 NOTE — MR AVS SNAPSHOT
Visit Information Date & Time Provider Department Dept. Phone Encounter #  
 11/13/2017  3:45 PM Corinna ZamoraJaya [phone] [id number] Follow-up Instructions Return if symptoms worsen or fail to improve. Upcoming Health Maintenance Date Due  
 EYE EXAM RETINAL OR DILATED Q1 8/17/1966 ZOSTER VACCINE AGE 60> 6/17/2016 BREAST CANCER SCRN MAMMOGRAM 12/30/2017 Pneumococcal 19-64 Medium Risk (1 of 1 - PPSV23) 8/23/2021* HEMOGLOBIN A1C Q6M 12/26/2017 MICROALBUMIN Q1 6/26/2018 LIPID PANEL Q1 6/26/2018 FOOT EXAM Q1 7/3/2018 PAP AKA CERVICAL CYTOLOGY 11/19/2018 COLONOSCOPY 1/21/2019 DTaP/Tdap/Td series (2 - Td) 9/1/2023 *Topic was postponed. The date shown is not the original due date. Allergies as of 11/13/2017  Review Complete On: 11/13/2017 By: Corinna Zamora MD  
  
 Severity Noted Reaction Type Reactions No Allergy Information Available  09/04/2011    Other (comments)  
 unresponsive No Known Drug Allergies  09/04/2011    Unknown (comments) NKDA per pt's  Current Immunizations  Reviewed on 10/10/2016 Name Date Influenza Vaccine (Quad) 11/19/2015 11:47 AM  
 Influenza Vaccine (Quad) PF 11/7/2017, 10/10/2016, 1/28/2015 Tdap 9/1/2013  6:31 PM  
  
 Not reviewed this visit You Were Diagnosed With   
  
 Codes Comments Dizziness    -  Primary ICD-10-CM: S35 ICD-9-CM: 780.4 Vitals BP Pulse Temp Resp Height(growth percentile) Weight(growth percentile) 114/74 (BP 1 Location: Left arm, BP Patient Position: Standing) 90 97.8 °F (36.6 °C) (Oral) 18 5' 6\" (1.676 m) 243 lb (110.2 kg) SpO2 BMI OB Status Smoking Status 100% 39.22 kg/m2 Hysterectomy Former Smoker Vitals History BMI and BSA Data Body Mass Index Body Surface Area  
 39.22 kg/m 2 2.27 m 2 Preferred Pharmacy Pharmacy Name Phone [facility] #[id number] - Betha Piggs - [address] [phone] Your Updated Medication List  
  
   
This list is accurate as of: 11/13/17  5:05 PM.  Always use your most recent med list. amLODIPine 5 mg tablet Commonly known as:  Lyudmila Zi TAKE 1 TABLET BY MOUTH DAILY FOR PRINZMETAL ANGINA  
  
 benzonatate 100 mg capsule Commonly known as:  TESSALON Take 1 Cap by mouth three (3) times daily as needed for Cough for up to 7 days. cefdinir 300 mg capsule Commonly known as:  OMNICEF Take 1 Cap by mouth two (2) times a day for 10 days. cetirizine 10 mg tablet Commonly known as:  ZYRTEC Take 1 Tab by mouth daily. Indications: ALLERGIC RHINITIS  
  
 doxepin 25 mg capsule Commonly known as:  SINEquan TAKE 1 CAPSULE BY MOUTH AT BEDTIME  
  
 escitalopram oxalate 20 mg tablet Commonly known as:  Yara Basket TAKE 2 TABLETS BY MOUTH DAILY  
  
 lamoTRIgine 100 mg tablet Commonly known as: LaMICtal  
Take 1 Tab by mouth daily. levothyroxine 175 mcg tablet Commonly known as:  SYNTHROID  
TAKE 1 TABLET BY MOUTH DAILY ( BEFORE BREAKFAST )  
  
 metFORMIN 500 mg tablet Commonly known as:  GLUCOPHAGE  
TAKE 1 TABLET BY MOUTH TWICE DAILY WITH MEALS  
  
 omeprazole 20 mg capsule Commonly known as:  PRILOSEC  
TAKE 1 CAPSULE BY MOUTH TWICE DAILY pravastatin 40 mg tablet Commonly known as:  PRAVACHOL  
TAKE 1 TABLET BY MOUTH EVERY NIGHT  
  
 pregabalin 150 mg capsule Commonly known as:  Maryetta Craw TAKE ONE CAPSULE BY MOUTH TWICE A DAY  Indications: FIBROMYALGIA  
  
 VYVANSE 60 mg capsule Generic drug:  Lisdexamfetamine TAKE ONE CAPSULE BY MOUTH EVERY DAY We Performed the Following AMB POC EKG ROUTINE W/ 12 LEADS, INTER & REP [33703 CPT(R)] CBC WITH AUTOMATED DIFF [11917 CPT(R)] METABOLIC PANEL, COMPREHENSIVE [02926 CPT(R)] Follow-up Instructions Return if symptoms worsen or fail to improve. Patient Instructions Dizziness: Care Instructions Your Care Instructions Dizziness is the feeling of unsteadiness or fuzziness in your head. It is different than having vertigo, which is a feeling that the room is spinning or that you are moving or falling. It is also different from lightheadedness, which is the feeling that you are about to faint. It can be hard to know what causes dizziness. Some people feel dizzy when they have migraine headaches. Sometimes bouts of flu can make you feel dizzy. Some medical conditions, such as heart problems or high blood pressure, can make you feel dizzy. Many medicines can cause dizziness, including medicines for high blood pressure, pain, or anxiety. If a medicine causes your symptoms, your doctor may recommend that you stop or change the medicine. If it is a problem with your heart, you may need medicine to help your heart work better. If there is no clear reason for your symptoms, your doctor may suggest watching and waiting for a while to see if the dizziness goes away on its own. Follow-up care is a key part of your treatment and safety. Be sure to make and go to all appointments, and call your doctor if you are having problems. It's also a good idea to know your test results and keep a list of the medicines you take. How can you care for yourself at home? · If your doctor recommends or prescribes medicine, take it exactly as directed. Call your doctor if you think you are having a problem with your medicine. · Do not drive while you feel dizzy. · Try to prevent falls. Steps you can take include: ¨ Using nonskid mats, adding grab bars near the tub, and using night-lights. ¨ Clearing your home so that walkways are free of anything you might trip on. ¨ Letting family and friends know that you have been feeling dizzy. This will help them know how to help you. When should you call for help? Call 911 anytime you think you may need emergency care. For example, call if: 
? · You passed out (lost consciousness). ? · You have dizziness along with symptoms of a heart attack. These may include: ¨ Chest pain or pressure, or a strange feeling in the chest. 
¨ Sweating. ¨ Shortness of breath. ¨ Nausea or vomiting. ¨ Pain, pressure, or a strange feeling in the back, neck, jaw, or upper belly or in one or both shoulders or arms. ¨ Lightheadedness or sudden weakness. ¨ A fast or irregular heartbeat. ? · You have symptoms of a stroke. These may include: 
¨ Sudden numbness, tingling, weakness, or loss of movement in your face, arm, or leg, especially on only one side of your body. ¨ Sudden vision changes. ¨ Sudden trouble speaking. ¨ Sudden confusion or trouble understanding simple statements. ¨ Sudden problems with walking or balance. ¨ A sudden, severe headache that is different from past headaches. ?Call your doctor now or seek immediate medical care if: 
? · You feel dizzy and have a fever, headache, or ringing in your ears. ? · You have new or increased nausea and vomiting. ? · Your dizziness does not go away or comes back. ? Watch closely for changes in your health, and be sure to contact your doctor if: 
? · You do not get better as expected. Where can you learn more? Go to http://kelli-carl.info/. Enter D207 in the search box to learn more about \"Dizziness: Care Instructions. \" Current as of: March 20, 2017 Content Version: 11.4 © 9269-6455 [employer]. Care instructions adapted under license by [employer] (which disclaims liability or warranty for this information). If you have questions about a medical condition or this instruction, always ask your healthcare professional. [place] [address] any warranty or liability for your use of this information. Introducing [facility]! Pravin Kirkpatrick introduces [employer]) patient portal. Now you can access parts of your medical record, email your doctor's office, and request medication refills online. 1. In your internet browser, go to [email or web address] 2. Click on the First Time User? Click Here link in the Sign In box. You will see the New Member Sign Up page. 3. Enter your [employer]) Access Code exactly as it appears below. You will not need to use this code after youve completed the sign-up process. If you do not sign up before the expiration date, you must request a new code. · [employer]HI) Access Code: [email or web address] Expires: 1/9/2018 11:15 AM 
 
4. Enter the last four digits of your Social Security Number (xxxx) and Date of Birth (mm/dd/yyyy) as indicated and click Submit. You will be taken to the next sign-up page. 5. Create a [employer]HI) ID. This will be your [employer]) login ID and cannot be changed, so think of one that is secure and easy to remember. 6. Create a [employer]) password. You can change your password at any time. 7. Enter your Password Reset Question and Answer. This can be used at a later time if you forget your password. 8. Enter your e-mail address. You will receive e-mail notification when new information is available in [address] 9. Click Sign Up. You can now view and download portions of your medical record. 10. Click the Download Summary menu link to download a portable copy of your medical information. If you have questions, please visit the Frequently Asked Questions section of the [employer]) website. Remember, [employer]) is NOT to be used for urgent needs. For medical emergencies, dial 911. Now available from your iPhone and Android! Please provide this summary of care documentation to your next provider. Your primary care clinician is listed as Ming Gates. If you have any questions after today's visit, please call [phone].

## 2017-11-13 NOTE — PROGRESS NOTES
Subjective:      Doug Wilkes is a 65 y.o. female here with complaint of \"woozy headache\" and \"woozy dizziness\". Onset was approximately 1 weeks ago. Has been associated with a cold and clammy sweat noticed with exertion at work, nausea, sensation in the anterior chest that radiates upward into the throat (states that she would not characterize as pain). There have been a few times where she felt as though it was difficult for her to swallow. She has had to leave work early due to these symptoms. She has h/o URI for which she has completed a course of antibiotics. Denies presyncope or syncope, tinnitus, hearing loss, bleeding episodes. She has been eating and drinking well. Blood glucose is not measured at home. - Exacerbated with movement. - Alleviated with rest.     Current Outpatient Prescriptions   Medication Sig Dispense Refill    cefdinir (OMNICEF) 300 mg capsule Take 1 Cap by mouth two (2) times a day for 10 days. 20 Cap 0    benzonatate (TESSALON) 100 mg capsule Take 1 Cap by mouth three (3) times daily as needed for Cough for up to 7 days. 21 Cap 0    pregabalin (LYRICA) 150 mg capsule TAKE ONE CAPSULE BY MOUTH TWICE A DAY  Indications: FIBROMYALGIA 60 Cap 0    amLODIPine (NORVASC) 5 mg tablet TAKE 1 TABLET BY MOUTH DAILY FOR PRINZMETAL ANGINA 90 Tab 3    doxepin (SINEQUAN) 25 mg capsule TAKE 1 CAPSULE BY MOUTH AT BEDTIME 90 Cap 3    escitalopram oxalate (LEXAPRO) 20 mg tablet TAKE 2 TABLETS BY MOUTH DAILY 180 Tab 3    lamoTRIgine (LAMICTAL) 100 mg tablet Take 1 Tab by mouth daily.  90 Tab 3    levothyroxine (SYNTHROID) 175 mcg tablet TAKE 1 TABLET BY MOUTH DAILY ( BEFORE BREAKFAST ) 90 Tab 3    metFORMIN (GLUCOPHAGE) 500 mg tablet TAKE 1 TABLET BY MOUTH TWICE DAILY WITH MEALS 180 Tab 3    omeprazole (PRILOSEC) 20 mg capsule TAKE 1 CAPSULE BY MOUTH TWICE DAILY 180 Cap 3    pravastatin (PRAVACHOL) 40 mg tablet TAKE 1 TABLET BY MOUTH EVERY NIGHT 90 Tab 3    cetirizine (ZYRTEC) 10 mg tablet Take 1 Tab by mouth daily. Indications: ALLERGIC RHINITIS 30 Tab 2    VYVANSE 60 mg capsule TAKE ONE CAPSULE BY MOUTH EVERY DAY  0       Allergies   Allergen Reactions    No Allergy Information Available Other (comments)     unresponsive    No Known Drug Allergies Unknown (comments)     NKDA per pt's        Past Medical History:   Diagnosis Date    Depression     Diabetes ([address]     Gastrointestinal disorder     gerd    High cholesterol     Obese     Other ill-defined conditions(799.89)     fibromyalgia    Psychiatric disorder     depression       Social History   Substance Use Topics    Smoking status: Former Smoker    Smokeless tobacco: Never Used    Alcohol use No      Comment: rarely         Review of Systems  Pertinent items are noted in HPI. Objective:     Vitals:    11/13/17 1552 11/13/17 1657 11/13/17 1658 11/13/17 1659   BP: 148/81  Comment: auto cuff 135/76  Comment: auto cuff 137/72  Comment: auto cuff 114/74  Comment: auto cuff   BP 1 Location: Right arm Left arm Left arm Left arm   BP Patient Position: Sitting Supine Sitting Standing   Pulse: 90      Resp: 18      Temp: 97.8 °F (36.6 °C)  Comment: .       TempSrc: Oral      SpO2: 100%      Weight: 243 lb (110.2 kg)      Height: 5' 6\" (1.676 m)           General appearance - alert, well appearing, and in no distress  Eyes - pupils equal and reactive, extraocular eye movements intact, sclera anicteric  Oropharyngx - mucous membranes moist, pharynx normal without lesions  Neck - supple, no significant adenopathy, carotids upstroke normal bilaterally, no bruits  Chest - clear to auscultation, no wheezes, rales or rhonchi, symmetric air entry, no tachypnea, retractions or cyanosis  Heart - normal rate, regular rhythm, normal S1, S2, no murmurs, rubs, clicks or gallops  Neurological - alert, oriented, normal speech, no focal findings or movement disorder noted, cranial nerves II through XII intact, motor and sensory grossly normal bilaterally, Romberg sign negative, normal gait and station    EKG: normal sinus rhythm, no acute T wave changes, unchanged from previous tracings     Assessment/Plan:   Della Lazar is a 65 y.o. female seen for:     1. Dizziness: no vertiginous symptoms reported. She is mildly orthostatic today. Examination benign at this time. Check labs as below to evaluate for metabolic derangement. Has been recommended by her employer that she have paperwork completed for intermittent leave; paperwork has been received and will be completed on her behalf. - AMB POC EKG ROUTINE W/ 12 LEADS, INTER & REP  - CBC WITH AUTOMATED DIFF  - METABOLIC PANEL, COMPREHENSIVE  - push fluids     I have discussed the diagnosis with the patient and the intended plan as seen in the above orders. The patient has received an after-visit summary and questions were answered concerning future plans. I have discussed medication side effects and warnings with the patient as well. Patient verbalizes understanding of plan of care and denies further questions or concerns at this time. Informed patient to return to the office if symptoms worsen or if new symptoms arise. Follow-up Disposition:  Return if symptoms worsen or fail to improve.

## 2017-11-13 NOTE — PATIENT INSTRUCTIONS
Dizziness: Care Instructions  Your Care Instructions  Dizziness is the feeling of unsteadiness or fuzziness in your head. It is different than having vertigo, which is a feeling that the room is spinning or that you are moving or falling. It is also different from lightheadedness, which is the feeling that you are about to faint. It can be hard to know what causes dizziness. Some people feel dizzy when they have migraine headaches. Sometimes bouts of flu can make you feel dizzy. Some medical conditions, such as heart problems or high blood pressure, can make you feel dizzy. Many medicines can cause dizziness, including medicines for high blood pressure, pain, or anxiety. If a medicine causes your symptoms, your doctor may recommend that you stop or change the medicine. If it is a problem with your heart, you may need medicine to help your heart work better. If there is no clear reason for your symptoms, your doctor may suggest watching and waiting for a while to see if the dizziness goes away on its own. Follow-up care is a key part of your treatment and safety. Be sure to make and go to all appointments, and call your doctor if you are having problems. It's also a good idea to know your test results and keep a list of the medicines you take. How can you care for yourself at home? · If your doctor recommends or prescribes medicine, take it exactly as directed. Call your doctor if you think you are having a problem with your medicine. · Do not drive while you feel dizzy. · Try to prevent falls. Steps you can take include:  ¨ Using nonskid mats, adding grab bars near the tub, and using night-lights. ¨ Clearing your home so that walkways are free of anything you might trip on. ¨ Letting family and friends know that you have been feeling dizzy. This will help them know how to help you. When should you call for help? Call 911 anytime you think you may need emergency care.  For example, call if:  ? · You passed out (lost consciousness). ? · You have dizziness along with symptoms of a heart attack. These may include:  ¨ Chest pain or pressure, or a strange feeling in the chest.  ¨ Sweating. ¨ Shortness of breath. ¨ Nausea or vomiting. ¨ Pain, pressure, or a strange feeling in the back, neck, jaw, or upper belly or in one or both shoulders or arms. ¨ Lightheadedness or sudden weakness. ¨ A fast or irregular heartbeat. ? · You have symptoms of a stroke. These may include:  ¨ Sudden numbness, tingling, weakness, or loss of movement in your face, arm, or leg, especially on only one side of your body. ¨ Sudden vision changes. ¨ Sudden trouble speaking. ¨ Sudden confusion or trouble understanding simple statements. ¨ Sudden problems with walking or balance. ¨ A sudden, severe headache that is different from past headaches. ?Call your doctor now or seek immediate medical care if:  ? · You feel dizzy and have a fever, headache, or ringing in your ears. ? · You have new or increased nausea and vomiting. ? · Your dizziness does not go away or comes back. ? Watch closely for changes in your health, and be sure to contact your doctor if:  ? · You do not get better as expected. Where can you learn more? Go to http://kelli-carl.info/. Enter I944 in the search box to learn more about \"Dizziness: Care Instructions. \"  Current as of: March 20, 2017  Content Version: 11.4  © 3713-4184 [employer]. Care instructions adapted under license by [employer] (which disclaims liability or warranty for this information). If you have questions about a medical condition or this instruction, always ask your healthcare professional. [place] [address] any warranty or liability for your use of this information.

## 2017-11-15 LAB
ALBUMIN SERPL-MCNC: 4.3 G/DL (ref 3.6–4.8)
ALBUMIN/GLOB SERPL: 1.7 {RATIO} (ref 1.2–2.2)
ALP SERPL-CCNC: 85 IU/L (ref 39–117)
ALT SERPL-CCNC: 29 IU/L (ref 0–32)
AST SERPL-CCNC: 25 IU/L (ref 0–40)
BASOPHILS # BLD AUTO: 0 X10E3/UL (ref 0–0.2)
BASOPHILS NFR BLD AUTO: 0 %
BILIRUB SERPL-MCNC: 0.4 MG/DL (ref 0–1.2)
BUN SERPL-MCNC: 14 MG/DL (ref 8–27)
BUN/CREAT SERPL: 18 (ref 12–28)
CALCIUM SERPL-MCNC: 9.6 MG/DL (ref 8.7–10.3)
CHLORIDE SERPL-SCNC: 100 MMOL/L (ref 96–106)
CO2 SERPL-SCNC: 26 MMOL/L (ref 18–29)
CREAT SERPL-MCNC: 0.79 MG/DL (ref 0.57–1)
EOSINOPHIL # BLD AUTO: 0.2 X10E3/UL (ref 0–0.4)
EOSINOPHIL NFR BLD AUTO: 3 %
ERYTHROCYTE [DISTWIDTH] IN BLOOD BY AUTOMATED COUNT: 13.9 % (ref 12.3–15.4)
GFR SERPLBLD CREATININE-BSD FMLA CKD-EPI: 81 ML/MIN/1.73
GFR SERPLBLD CREATININE-BSD FMLA CKD-EPI: 93 ML/MIN/1.73
GLOBULIN SER CALC-MCNC: 2.5 G/DL (ref 1.5–4.5)
GLUCOSE SERPL-MCNC: 107 MG/DL (ref 65–99)
HCT VFR BLD AUTO: 40.5 % (ref 34–46.6)
HGB BLD-MCNC: 13.2 G/DL (ref 11.1–15.9)
IMM GRANULOCYTES # BLD: 0 X10E3/UL (ref 0–0.1)
IMM GRANULOCYTES NFR BLD: 0 %
LYMPHOCYTES # BLD AUTO: 1.8 X10E3/UL (ref 0.7–3.1)
LYMPHOCYTES NFR BLD AUTO: 30 %
MCH RBC QN AUTO: 27.7 PG (ref 26.6–33)
MCHC RBC AUTO-ENTMCNC: 32.6 G/DL (ref 31.5–35.7)
MCV RBC AUTO: 85 FL (ref 79–97)
MONOCYTES # BLD AUTO: 0.4 X10E3/UL (ref 0.1–0.9)
MONOCYTES NFR BLD AUTO: 7 %
NEUTROPHILS # BLD AUTO: 3.7 X10E3/UL (ref 1.4–7)
NEUTROPHILS NFR BLD AUTO: 60 %
PLATELET # BLD AUTO: 246 X10E3/UL (ref 150–379)
POTASSIUM SERPL-SCNC: 4.3 MMOL/L (ref 3.5–5.2)
PROT SERPL-MCNC: 6.8 G/DL (ref 6–8.5)
RBC # BLD AUTO: 4.77 X10E6/UL (ref 3.77–5.28)
SODIUM SERPL-SCNC: 142 MMOL/L (ref 134–144)
WBC # BLD AUTO: 6.1 X10E3/UL (ref 3.4–10.8)

## 2017-11-16 ENCOUNTER — TELEPHONE (OUTPATIENT)
Dept: FAMILY MEDICINE CLINIC | Age: 62
End: 2017-11-16

## 2017-11-16 DIAGNOSIS — R42 DIZZINESS: Primary | ICD-10-CM

## 2017-11-16 NOTE — TELEPHONE ENCOUNTER
Patient called and informed of labs results (normal CMP and CBC). States that she continues to have dizziness. Has been thinking about her symptoms and states that a number of years ago dizziness was her presenting with thyroid disease. TSH was not checked with her last labs and she would like to have checked. Lab order placed. She will come in tomorrow to have performed.      Orders Placed This Encounter    THYROID CASCADE PROFILE

## 2017-11-20 ENCOUNTER — OFFICE VISIT (OUTPATIENT)
Dept: FAMILY MEDICINE CLINIC | Age: 62
End: 2017-11-20

## 2017-11-20 VITALS
OXYGEN SATURATION: 95 % | RESPIRATION RATE: 18 BRPM | TEMPERATURE: 98.7 F | HEIGHT: 66 IN | HEART RATE: 88 BPM | WEIGHT: 243 LBS | BODY MASS INDEX: 39.05 KG/M2

## 2017-11-20 DIAGNOSIS — J02.9 SORE THROAT: ICD-10-CM

## 2017-11-20 DIAGNOSIS — J02.0 STREP PHARYNGITIS: Primary | ICD-10-CM

## 2017-11-20 LAB
S PYO AG THROAT QL: POSITIVE
TSH SERPL DL<=0.005 MIU/L-ACNC: 0.77 UIU/ML (ref 0.45–4.5)
VALID INTERNAL CONTROL?: YES

## 2017-11-20 RX ORDER — CEFDINIR 300 MG/1
CAPSULE ORAL
Refills: 0 | COMMUNITY
Start: 2017-11-07 | End: 2017-11-20 | Stop reason: ALTCHOICE

## 2017-11-20 RX ORDER — AMOXICILLIN 875 MG/1
875 TABLET, FILM COATED ORAL 2 TIMES DAILY
Qty: 20 TAB | Refills: 0 | Status: SHIPPED | OUTPATIENT
Start: 2017-11-20 | End: 2017-11-30

## 2017-11-20 NOTE — PROGRESS NOTES
Identified pt with two pt identifiers(name and ). Chief Complaint   Patient presents with    Sore Throat        Health Maintenance Due   Topic    EYE EXAM RETINAL OR DILATED Q1     ZOSTER VACCINE AGE 60>     BREAST CANCER SCRN MAMMOGRAM        Wt Readings from Last 3 Encounters:   17 243 lb (110.2 kg)   17 243 lb (110.2 kg)   17 249 lb (112.9 kg)     Temp Readings from Last 3 Encounters:   17 98.7 °F (37.1 °C) (Oral)   17 97.8 °F (36.6 °C) (Oral)   17 98 °F (36.7 °C) (Oral)     BP Readings from Last 3 Encounters:   17 114/74   17 143/65   10/11/17 118/65     Pulse Readings from Last 3 Encounters:   17 88   17 90   17 69         Learning Assessment:  :     Learning Assessment 2014   PRIMARY LEARNER Patient   HIGHEST LEVEL OF EDUCATION - PRIMARY LEARNER  DID NOT GRADUATE HIGH SCHOOL   BARRIERS PRIMARY LEARNER NONE   CO-LEARNER CAREGIVER No   PRIMARY LANGUAGE ENGLISH   LEARNER PREFERENCE PRIMARY OTHER (COMMENT)   ANSWERED BY patient   RELATIONSHIP SELF       Depression Screening:  :     PHQ over the last two weeks 10/11/2017   PHQ Not Done Active Diagnosis of Depression or Bipolar Disorder   Little interest or pleasure in doing things -   Feeling down, depressed or hopeless -   Total Score PHQ 2 -       Fall Risk Assessment:  :     Fall Risk Assessment, last 12 mths 10/11/2017   Able to walk? Yes   Fall in past 12 months? No       Abuse Screening:  :     Abuse Screening Questionnaire 10/11/2017 10/10/2016 2015   Do you ever feel afraid of your partner? N N N   Are you in a relationship with someone who physically or mentally threatens you? N N N   Is it safe for you to go home?  Y Y Y       Coordination of Care Questionnaire:  :     1) Have you been to an emergency room, urgent care clinic since your last visit? no   Hospitalized since your last visit? no             2) Have you seen or consulted any other health care providers outside of [facility] since your last visit? no  (Include any pap smears or colon screenings in this section.)    3) Do you have an Advance Directive on file? no  Are you interested in receiving information about Advance Directives? no    Reviewed record in preparation for visit and have obtained necessary documentation. Medication reconciliation up to date and corrected with patient at this time.

## 2017-11-20 NOTE — MR AVS SNAPSHOT
Visit Information Date & Time Provider Department Dept. Phone Encounter #  
 11/20/2017  3:45 PM Emmett Sexton Jaya Tho [phone] [id number] Follow-up Instructions Return if symptoms worsen or fail to improve. Upcoming Health Maintenance Date Due  
 EYE EXAM RETINAL OR DILATED Q1 8/17/1966 ZOSTER VACCINE AGE 60> 6/17/2016 BREAST CANCER SCRN MAMMOGRAM 12/30/2017 Pneumococcal 19-64 Medium Risk (1 of 1 - PPSV23) 8/23/2021* HEMOGLOBIN A1C Q6M 12/26/2017 MICROALBUMIN Q1 6/26/2018 LIPID PANEL Q1 6/26/2018 FOOT EXAM Q1 7/3/2018 PAP AKA CERVICAL CYTOLOGY 11/19/2018 COLONOSCOPY 1/21/2019 DTaP/Tdap/Td series (2 - Td) 9/1/2023 *Topic was postponed. The date shown is not the original due date. Allergies as of 11/20/2017  Review Complete On: 11/20/2017 By: Emmett Sexton MD  
  
 Severity Noted Reaction Type Reactions No Allergy Information Available  09/04/2011    Other (comments)  
 unresponsive No Known Drug Allergies  09/04/2011    Unknown (comments) NKDA per pt's  Current Immunizations  Reviewed on 10/10/2016 Name Date Influenza Vaccine (Quad) 11/19/2015 11:47 AM  
 Influenza Vaccine (Quad) PF 11/7/2017, 10/10/2016, 1/28/2015 Tdap 9/1/2013  6:31 PM  
  
 Not reviewed this visit You Were Diagnosed With   
  
 Codes Comments Strep pharyngitis    -  Primary ICD-10-CM: J02.0 ICD-9-CM: 034.0 Sore throat     ICD-10-CM: J02.9 ICD-9-CM: 264 Vitals Pulse Temp Resp Height(growth percentile) Weight(growth percentile) SpO2  
 88 98.7 °F (37.1 °C) (Oral) 18 5' 6\" (1.676 m) 243 lb (110.2 kg) 95% BMI OB Status Smoking Status 39.22 kg/m2 Hysterectomy Former Smoker BMI and BSA Data Body Mass Index Body Surface Area  
 39.22 kg/m 2 2.27 m 2 Preferred Pharmacy Pharmacy Name Phone [facility]/PHARMACY #[id number] - [place] - [address] [phone] Your Updated Medication List  
  
   
This list is accurate as of: 11/20/17  4:25 PM.  Always use your most recent med list. amLODIPine 5 mg tablet Commonly known as:  Allison Citron TAKE 1 TABLET BY MOUTH DAILY FOR PRINZMETAL ANGINA  
  
 amoxicillin 875 mg tablet Commonly known as:  AMOXIL Take 1 Tab by mouth two (2) times a day for 10 days. cetirizine 10 mg tablet Commonly known as:  ZYRTEC Take 1 Tab by mouth daily. Indications: ALLERGIC RHINITIS  
  
 doxepin 25 mg capsule Commonly known as:  SINEquan TAKE 1 CAPSULE BY MOUTH AT BEDTIME  
  
 escitalopram oxalate 20 mg tablet Commonly known as:  Shan Barrs TAKE 2 TABLETS BY MOUTH DAILY  
  
 lamoTRIgine 100 mg tablet Commonly known as: LaMICtal  
Take 1 Tab by mouth daily. levothyroxine 175 mcg tablet Commonly known as:  SYNTHROID  
TAKE 1 TABLET BY MOUTH DAILY ( BEFORE BREAKFAST )  
  
 metFORMIN 500 mg tablet Commonly known as:  GLUCOPHAGE  
TAKE 1 TABLET BY MOUTH TWICE DAILY WITH MEALS  
  
 omeprazole 20 mg capsule Commonly known as:  PRILOSEC  
TAKE 1 CAPSULE BY MOUTH TWICE DAILY pravastatin 40 mg tablet Commonly known as:  PRAVACHOL  
TAKE 1 TABLET BY MOUTH EVERY NIGHT  
  
 pregabalin 150 mg capsule Commonly known as:  Walda Smoker TAKE ONE CAPSULE BY MOUTH TWICE A DAY  Indications: FIBROMYALGIA  
  
 VYVANSE 60 mg capsule Generic drug:  Lisdexamfetamine TAKE ONE CAPSULE BY MOUTH EVERY DAY Prescriptions Sent to Pharmacy Refills  
 amoxicillin (AMOXIL) 875 mg tablet 0 Sig: Take 1 Tab by mouth two (2) times a day for 10 days. Class: Normal  
 Pharmacy: [facility]/pharmacy #[id number] - [place]  [address] Ph #: [phone] Route: Oral  
  
We Performed the Following AMB POC RAPID STREP A [38155 CPT(R)] Follow-up Instructions Return if symptoms worsen or fail to improve. Patient Instructions Strep Throat: Care Instructions Your Care Instructions Strep throat is a bacterial infection that causes sudden, severe sore throat and fever. Strep throat, which is caused by bacteria called streptococcus, is treated with antibiotics. Sometimes a strep test is necessary to tell if the sore throat is caused by strep bacteria. Treatment can help ease symptoms and may prevent future problems. Follow-up care is a key part of your treatment and safety. Be sure to make and go to all appointments, and call your doctor if you are having problems. It's also a good idea to know your test results and keep a list of the medicines you take. How can you care for yourself at home? · Take your antibiotics as directed. Do not stop taking them just because you feel better. You need to take the full course of antibiotics. · Strep throat can spread to others until 24 hours after you begin taking antibiotics. During this time, you should avoid contact with other people at work or home, especially infants and children. Do not sneeze or cough on others, and wash your hands often. Keep your drinking glass and eating utensils separate from those of others, and wash these items well in hot, soapy water. · Gargle with warm salt water at least once each hour to help reduce swelling and make your throat feel better. Use 1 teaspoon of salt mixed in 8 fluid ounces of warm water. · Take an over-the-counter pain medication, such as acetaminophen (Tylenol), ibuprofen (Advil, Motrin), or naproxen (Aleve). Read and follow all instructions on the label. · Try an over-the-counter anesthetic throat spray or throat lozenges, which may help relieve throat pain. · Drink plenty of fluids. Fluids may help soothe an irritated throat. Hot fluids, such as tea or soup, may help your throat feel better. · Eat soft solids and drink plenty of clear liquids. Flavored ice pops, ice cream, scrambled eggs, sherbet, and gelatin dessert (such as Jell-O) may also soothe the throat. · Get lots of rest. 
· Do not smoke, and avoid secondhand smoke. If you need help quitting, talk to your doctor about stop-smoking programs and medicines. These can increase your chances of quitting for good. · Use a vaporizer or humidifier to add moisture to the air in your bedroom. Follow the directions for cleaning the machine. When should you call for help? Call your doctor now or seek immediate medical care if: 
? · You have a new or higher fever. ? · You have a fever with a stiff neck or severe headache. ? · You have new or worse trouble swallowing. ? · Your sore throat gets much worse on one side. ? · Your pain becomes much worse on one side of your throat. ? Watch closely for changes in your health, and be sure to contact your doctor if: 
? · You are not getting better after 2 days (48 hours). ? · You do not get better as expected. Where can you learn more? Go to http://kelli-carl.info/. Enter K625 in the search box to learn more about \"Strep Throat: Care Instructions. \" Current as of: May 12, 2017 Content Version: 11.4 © 9415-7983 [employer]. Care instructions adapted under license by [employer] (which disclaims liability or warranty for this information). If you have questions about a medical condition or this instruction, always ask your healthcare professional. [address] any warranty or liability for your use of this information. Introducing hospitals & HEALTH SERVICES! Jacqui Sexton introduces [employer] patient portal. Now you can access parts of your medical record, email your doctor's office, and request medication refills online. 1. In your internet browser, go to [email or web address] 2. Click on the First Time User? Click Here link in the Sign In box. You will see the New Member Sign Up page. 3. Enter your [employer] Access Code exactly as it appears below.  You will not need to use this code after youve completed the sign-up process. If you do not sign up before the expiration date, you must request a new code. · Sars[employer] Access Code: [email or web address] Expires: 1/9/2018 11:15 AM 
 
4. Enter the last four digits of your Social Security Number (xxxx) and Date of Birth (mm/dd/yyyy) as indicated and click Submit. You will be taken to the next sign-up page. 5. Create a Sarsys ID. This will be your Sarsys login ID and cannot be changed, so think of one that is secure and easy to remember. 6. Create a Sarsys password. You can change your password at any time. 7. Enter your Password Reset Question and Answer. This can be used at a later time if you forget your password. 8. Enter your e-mail address. You will receive e-mail notification when new information is available in [address] 9. Click Sign Up. You can now view and download portions of your medical record. 10. Click the Download Summary menu link to download a portable copy of your medical information. If you have questions, please visit the Frequently Asked Questions section of the S[employer] website. Remember, [employer]arsys is NOT to be used for urgent needs. For medical emergencies, dial 911. Now available from your iPhone and Android! Please provide this summary of care documentation to your next provider. Your primary care clinician is listed as Marcela Donis. If you have any questions after today's visit, please call [phone].

## 2017-11-20 NOTE — PROGRESS NOTES
Subjective:   Laurie Hensley is a 65 y.o. female her with complaint of sore throat, pain with swallowing, fever, and body aches for the past day or so. No known sick contacts. She denies nausea, vomiting, cough, shortness of breath, diarrhea. Current Outpatient Prescriptions   Medication Sig Dispense Refill    pregabalin (LYRICA) 150 mg capsule TAKE ONE CAPSULE BY MOUTH TWICE A DAY  Indications: FIBROMYALGIA 60 Cap 0    amLODIPine (NORVASC) 5 mg tablet TAKE 1 TABLET BY MOUTH DAILY FOR PRINZMETAL ANGINA 90 Tab 3    doxepin (SINEQUAN) 25 mg capsule TAKE 1 CAPSULE BY MOUTH AT BEDTIME 90 Cap 3    escitalopram oxalate (LEXAPRO) 20 mg tablet TAKE 2 TABLETS BY MOUTH DAILY 180 Tab 3    lamoTRIgine (LAMICTAL) 100 mg tablet Take 1 Tab by mouth daily. 90 Tab 3    levothyroxine (SYNTHROID) 175 mcg tablet TAKE 1 TABLET BY MOUTH DAILY ( BEFORE BREAKFAST ) 90 Tab 3    metFORMIN (GLUCOPHAGE) 500 mg tablet TAKE 1 TABLET BY MOUTH TWICE DAILY WITH MEALS 180 Tab 3    omeprazole (PRILOSEC) 20 mg capsule TAKE 1 CAPSULE BY MOUTH TWICE DAILY 180 Cap 3    pravastatin (PRAVACHOL) 40 mg tablet TAKE 1 TABLET BY MOUTH EVERY NIGHT 90 Tab 3    cetirizine (ZYRTEC) 10 mg tablet Take 1 Tab by mouth daily.  Indications: ALLERGIC RHINITIS 30 Tab 2    VYVANSE 60 mg capsule TAKE ONE CAPSULE BY MOUTH EVERY DAY  0       Allergies   Allergen Reactions    No Allergy Information Available Other (comments)     unresponsive    No Known Drug Allergies Unknown (comments)     NKDA per pt's        Past Medical History:   Diagnosis Date    Depression     Diabetes ([facility] [address]     Gastrointestinal disorder     gerd    High cholesterol     Obese     Other ill-defined conditions(799.89)     fibromyalgia    Psychiatric disorder     depression       Social History   Substance Use Topics    Smoking status: Former Smoker    Smokeless tobacco: Never Used    Alcohol use No      Comment: rarely         Review of Systems  Pertinent items are noted in HPI. Objective:      Visit Vitals    Pulse 88    Temp 98.7 °F (37.1 °C) (Oral)    Resp 18    Ht 5' 6\" (1.676 m)    Wt 243 lb (110.2 kg)    SpO2 95%    BMI 39.22 kg/m2      General appearance: alert, ill appearing, and in no distress  Ears: bilateral TM's and external ear canals normal  Nasal exam - normal and patent, no erythema, discharge or polyps  Oropharynx: mucous membranes moist, pharynx normal without lesions, erythematous and exudate noted  Neck: bilateral symmetric anterior adenopathy  Heart: normal rate, regular rhythm, normal S1, S2, no murmurs, rubs, clicks or gallops  Lungs: clear to auscultation, no wheezes, rales or rhonchi, symmetric air entry, no tachypnea, retractions or cyanosis    Rapid Strep test is positive    Assessment/Plan:   Abhijit Pink is a 65 y.o. female seen for:     1. Strep pharyngitis  - amoxicillin (AMOXIL) 875 mg tablet; Take 1 Tab by mouth two (2) times a day for 10 days. Dispense: 20 Tab; Refill: 0  - Gargle with warm saltwater,  use acetaminophen or other OTC analgesic, and take Rx fully as prescribed. - Change toothbrush in 48 hours     2. Sore throat  - AMB POC RAPID STREP A    I have discussed the diagnosis with the patient and the intended plan as seen in the above orders. The patient has received an after-visit summary and questions were answered concerning future plans. I have discussed medication side effects and warnings with the patient as well. Patient verbalizes understanding of plan of care and denies further questions or concerns at this time. Informed patient to return to the office if symptoms worsen or if new symptoms arise. Follow-up Disposition:  Return if symptoms worsen or fail to improve.

## 2017-11-20 NOTE — PATIENT INSTRUCTIONS
Strep Throat: Care Instructions  Your Care Instructions    Strep throat is a bacterial infection that causes sudden, severe sore throat and fever. Strep throat, which is caused by bacteria called streptococcus, is treated with antibiotics. Sometimes a strep test is necessary to tell if the sore throat is caused by strep bacteria. Treatment can help ease symptoms and may prevent future problems. Follow-up care is a key part of your treatment and safety. Be sure to make and go to all appointments, and call your doctor if you are having problems. It's also a good idea to know your test results and keep a list of the medicines you take. How can you care for yourself at home? · Take your antibiotics as directed. Do not stop taking them just because you feel better. You need to take the full course of antibiotics. · Strep throat can spread to others until 24 hours after you begin taking antibiotics. During this time, you should avoid contact with other people at work or home, especially infants and children. Do not sneeze or cough on others, and wash your hands often. Keep your drinking glass and eating utensils separate from those of others, and wash these items well in hot, soapy water. · Gargle with warm salt water at least once each hour to help reduce swelling and make your throat feel better. Use 1 teaspoon of salt mixed in 8 fluid ounces of warm water. · Take an over-the-counter pain medication, such as acetaminophen (Tylenol), ibuprofen (Advil, Motrin), or naproxen (Aleve). Read and follow all instructions on the label. · Try an over-the-counter anesthetic throat spray or throat lozenges, which may help relieve throat pain. · Drink plenty of fluids. Fluids may help soothe an irritated throat. Hot fluids, such as tea or soup, may help your throat feel better. · Eat soft solids and drink plenty of clear liquids.  Flavored ice pops, ice cream, scrambled eggs, sherbet, and gelatin dessert (such as Jell-O) may also soothe the throat. · Get lots of rest.  · Do not smoke, and avoid secondhand smoke. If you need help quitting, talk to your doctor about stop-smoking programs and medicines. These can increase your chances of quitting for good. · Use a vaporizer or humidifier to add moisture to the air in your bedroom. Follow the directions for cleaning the machine. When should you call for help? Call your doctor now or seek immediate medical care if:  ? · You have a new or higher fever. ? · You have a fever with a stiff neck or severe headache. ? · You have new or worse trouble swallowing. ? · Your sore throat gets much worse on one side. ? · Your pain becomes much worse on one side of your throat. ? Watch closely for changes in your health, and be sure to contact your doctor if:  ? · You are not getting better after 2 days (48 hours). ? · You do not get better as expected. Where can you learn more? Go to http://kelli-carl.info/. Enter K625 in the search box to learn more about \"Strep Throat: Care Instructions. \"  Current as of: May 12, 2017  Content Version: 11.4  © 6725-8810 [employer], [employer]. Care instructions adapted under license by [employer] (which disclaims liability or warranty for this information). If you have questions about a medical condition or this instruction, always ask your healthcare professional. [address] any warranty or liability for your use of this information.

## 2017-11-24 ENCOUNTER — OFFICE VISIT (OUTPATIENT)
Dept: FAMILY MEDICINE CLINIC | Age: 62
End: 2017-11-24

## 2017-11-24 VITALS
HEART RATE: 78 BPM | OXYGEN SATURATION: 98 % | RESPIRATION RATE: 18 BRPM | WEIGHT: 250 LBS | DIASTOLIC BLOOD PRESSURE: 72 MMHG | HEIGHT: 66 IN | TEMPERATURE: 97.9 F | BODY MASS INDEX: 40.18 KG/M2 | SYSTOLIC BLOOD PRESSURE: 128 MMHG

## 2017-11-24 DIAGNOSIS — F34.1 DYSTHYMIA: ICD-10-CM

## 2017-11-24 DIAGNOSIS — R42 DIZZINESS: Primary | ICD-10-CM

## 2017-11-24 RX ORDER — ESCITALOPRAM OXALATE 20 MG/1
40 TABLET ORAL DAILY
Qty: 60 TAB | Refills: 0 | Status: SHIPPED | OUTPATIENT
Start: 2017-11-24 | End: 2018-01-14 | Stop reason: SDUPTHER

## 2017-11-24 NOTE — PROGRESS NOTES
Identified pt with two pt identifiers(name and ). Chief Complaint   Patient presents with    Follow-up     Still feeling woozy and dizzy    Medication Evaluation     refill of Lexapro short supply to pharmacy until fill from Mail delivery can come in        Health Maintenance Due   [address] Q1     ZOSTER VACCINE AGE 60>     BREAST CANCER SCRN MAMMOGRAM        Wt Readings from Last 3 Encounters:   17 250 lb (113.4 kg)   17 243 lb (110.2 kg)   17 243 lb (110.2 kg)     Temp Readings from Last 3 Encounters:   17 97.9 °F (36.6 °C) (Oral)   17 98.7 °F (37.1 °C) (Oral)   17 97.8 °F (36.6 °C) (Oral)     BP Readings from Last 3 Encounters:   17 128/72   17 114/74   17 143/65     Pulse Readings from Last 3 Encounters:   17 78   17 88   17 90         Learning Assessment:  :     Learning Assessment 2014   PRIMARY LEARNER Patient   HIGHEST LEVEL OF EDUCATION - PRIMARY LEARNER  DID NOT GRADUATE HIGH SCHOOL   BARRIERS PRIMARY LEARNER NONE   CO-LEARNER CAREGIVER No   PRIMARY LANGUAGE ENGLISH   LEARNER PREFERENCE PRIMARY OTHER (COMMENT)   ANSWERED BY patient   RELATIONSHIP SELF       Depression Screening:  :     PHQ over the last two weeks 10/11/2017   PHQ Not Done Active Diagnosis of Depression or Bipolar Disorder   Little interest or pleasure in doing things -   Feeling down, depressed or hopeless -   Total Score PHQ 2 -       Fall Risk Assessment:  :     Fall Risk Assessment, last 12 mths 10/11/2017   Able to walk? Yes   Fall in past 12 months? No       Abuse Screening:  :     Abuse Screening Questionnaire 10/11/2017 10/10/2016 2015   Do you ever feel afraid of your partner? N N N   Are you in a relationship with someone who physically or mentally threatens you? N N N   Is it safe for you to go home?  Rivrea Mae       Coordination of Care Questionnaire:  :     1) Have you been to an emergency room, urgent care clinic since your last visit? no   Hospitalized since your last visit? no             2) Have you seen or consulted any other health care providers outside of [address] [place] [address] since your last visit? no  (Include any pap smears or colon screenings in this section.)    3) Do you have an Advance Directive on file? no  Are you interested in receiving information about Advance Directives? no    Reviewed record in preparation for visit and have obtained necessary documentation. Medication reconciliation up to date and corrected with patient at this time.

## 2017-11-24 NOTE — PROGRESS NOTES
Subjective:     Zahra Ordonez is a 65 y.o. female here with continued complaint of \"wooziness\", fatigue, feels as though she has decreased strength, intermittent nausea, hot and cold spells. Workup thus far with normal labs. No exacerbating triggers reported. Her  sets up her medications and she has recently noticed that she has not been taking Lexapro. States that she called her mail order pharmacy and was informed that prescription has not been filled since May 2017. Current Outpatient Prescriptions   Medication Sig Dispense Refill    amoxicillin (AMOXIL) 875 mg tablet Take 1 Tab by mouth two (2) times a day for 10 days. 20 Tab 0    pregabalin (LYRICA) 150 mg capsule TAKE ONE CAPSULE BY MOUTH TWICE A DAY  Indications: FIBROMYALGIA 60 Cap 0    amLODIPine (NORVASC) 5 mg tablet TAKE 1 TABLET BY MOUTH DAILY FOR PRINZMETAL ANGINA 90 Tab 3    doxepin (SINEQUAN) 25 mg capsule TAKE 1 CAPSULE BY MOUTH AT BEDTIME 90 Cap 3    lamoTRIgine (LAMICTAL) 100 mg tablet Take 1 Tab by mouth daily. 90 Tab 3    levothyroxine (SYNTHROID) 175 mcg tablet TAKE 1 TABLET BY MOUTH DAILY ( BEFORE BREAKFAST ) 90 Tab 3    metFORMIN (GLUCOPHAGE) 500 mg tablet TAKE 1 TABLET BY MOUTH TWICE DAILY WITH MEALS 180 Tab 3    omeprazole (PRILOSEC) 20 mg capsule TAKE 1 CAPSULE BY MOUTH TWICE DAILY 180 Cap 3    pravastatin (PRAVACHOL) 40 mg tablet TAKE 1 TABLET BY MOUTH EVERY NIGHT 90 Tab 3    cetirizine (ZYRTEC) 10 mg tablet Take 1 Tab by mouth daily.  Indications: ALLERGIC RHINITIS 30 Tab 2    VYVANSE 60 mg capsule TAKE ONE CAPSULE BY MOUTH EVERY DAY  0    escitalopram oxalate (LEXAPRO) 20 mg tablet TAKE 2 TABLETS BY MOUTH DAILY 180 Tab 3       Allergies   Allergen Reactions    No Allergy Information Available Other (comments)     unresponsive    No Known Drug Allergies Unknown (comments)     NKDA per pt's        Past Medical History:   Diagnosis Date    Depression     Diabetes ([address]     Gastrointestinal disorder gerd    High cholesterol     Obese     Other ill-defined conditions(799.89)     fibromyalgia    Psychiatric disorder     depression       Social History   Substance Use Topics    Smoking status: Former Smoker    Smokeless tobacco: Never Used    Alcohol use No      Comment: rarely         Review of Systems  Pertinent items are noted in HPI. Objective:     Visit Vitals    /72 (BP 1 Location: Right arm, BP Patient Position: Sitting)  Comment: manual    Pulse 78    Temp 97.9 °F (36.6 °C) (Oral)  Comment: .  Resp 18    Ht 5' 6\" (1.676 m)    Wt 250 lb (113.4 kg)    SpO2 98%    BMI 40.35 kg/m2      General appearance - alert, well appearing, and in no distress  Eyes - pupils equal and reactive, extraocular eye movements intact, sclera anicteric  Ears - bilateral TM's and external ear canals normal  Oropharyngx - mucous membranes moist, pharynx normal without lesions  Neck - supple, no significant adenopathy  Chest - clear to auscultation, no wheezes, rales or rhonchi, symmetric air entry, no tachypnea, retractions or cyanosis  Heart - normal rate, regular rhythm, normal S1, S2, no murmurs, rubs, clicks or gallops    Assessment/Plan:   Archie Pritchard is a 65 y.o. female seen for:     1. Dizziness: continues with vague dizziness with onset after URI at beginning of this month. Laboratory and EKG evaluations normal. Will need to evaluate for inner ear disorder and have referred her to ENT for further evaluation.   - REFERRAL TO ENT-OTOLARYNGOLOGY    2. Dysthymia  - escitalopram oxalate (LEXAPRO) 20 mg tablet; Take 2 Tabs by mouth daily. Dispense: 60 Tab; Refill: 0    I have discussed the diagnosis with the patient and the intended plan as seen in the above orders. The patient has received an after-visit summary and questions were answered concerning future plans. I have discussed medication side effects and warnings with the patient as well.  Patient verbalizes understanding of plan of care and denies further questions or concerns at this time. Informed patient to return to the office if symptoms worsen or if new symptoms arise.     Follow-up Disposition: Not on File

## 2017-11-29 ENCOUNTER — TELEPHONE (OUTPATIENT)
Dept: FAMILY MEDICINE CLINIC | Age: 62
End: 2017-11-29

## 2017-11-29 NOTE — TELEPHONE ENCOUNTER
Patient is calling and stated that her work received paperwork that was filled out however there are some corrections that need to be made. The paperwork is going to be refaxed over or may have already been refaxed   Please correct and send back. Questions 1,6 and 7 inparticular need to be addressed.

## 2017-11-30 NOTE — TELEPHONE ENCOUNTER
I told her Dr Emilie Ordonez faxed it yesterday. She said the insurance lady told her to write frequency 5 X a week and lasting up to 2 days. I explained we could not do that. We do not know how long or how often. She understood. She was going to work today and I wished her well.

## 2017-12-01 ENCOUNTER — TELEPHONE (OUTPATIENT)
Dept: FAMILY MEDICINE CLINIC | Age: 62
End: 2017-12-01

## 2017-12-01 NOTE — TELEPHONE ENCOUNTER
Patient is calling and would like to talk to [place]. Please call her back at [phone]. She ask if at all possible can you please try to call today.

## 2017-12-20 DIAGNOSIS — M79.7 FIBROMYALGIA: Chronic | ICD-10-CM

## 2017-12-21 RX ORDER — PREGABALIN 150 MG/1
150 CAPSULE ORAL 2 TIMES DAILY
Qty: 180 CAP | Refills: 1 | Status: SHIPPED | OUTPATIENT
Start: 2017-12-21 | End: 2018-01-23 | Stop reason: SDUPTHER

## 2017-12-21 RX ORDER — PREGABALIN 150 MG/1
150 CAPSULE ORAL 2 TIMES DAILY
Qty: 60 CAP | Refills: 0 | OUTPATIENT
Start: 2017-12-21 | End: 2018-01-20

## 2017-12-21 NOTE — TELEPHONE ENCOUNTER
Patient called for clarification. Has not received Lyrica prescription for mail order pharmacy and has been out of medication. States that she tried to reorder medication through mail order system, but does not believe that she did so correctly. Will send 30-day supply of medication called into to her local [facility]. Ninety-day prescription faxed to [address] [place] [address] ([phone]) with confirmation received.

## 2018-01-23 DIAGNOSIS — Z91.09 POLLEN ALLERGIES: ICD-10-CM

## 2018-01-23 DIAGNOSIS — F34.1 DYSTHYMIA: ICD-10-CM

## 2018-01-23 DIAGNOSIS — M79.7 FIBROMYALGIA: Chronic | ICD-10-CM

## 2018-01-23 NOTE — TELEPHONE ENCOUNTER
Pt needs all the medication sent over to pharmacy because of insurance issue - please send over as a 30 day supply

## 2018-01-26 RX ORDER — LEVOTHYROXINE SODIUM 175 UG/1
TABLET ORAL
Qty: 30 TAB | Refills: 5 | Status: SHIPPED | OUTPATIENT
Start: 2018-01-26 | End: 2018-10-04 | Stop reason: DRUGHIGH

## 2018-01-26 RX ORDER — LISDEXAMFETAMINE DIMESYLATE 60 MG/1
CAPSULE ORAL
Refills: 0 | Status: CANCELLED | OUTPATIENT
Start: 2018-01-26

## 2018-01-26 RX ORDER — LAMOTRIGINE 100 MG/1
100 TABLET ORAL DAILY
Qty: 30 TAB | Refills: 5 | Status: SHIPPED | OUTPATIENT
Start: 2018-01-26 | End: 2018-04-24 | Stop reason: SDUPTHER

## 2018-01-26 RX ORDER — PREGABALIN 150 MG/1
150 CAPSULE ORAL 2 TIMES DAILY
Qty: 60 CAP | Refills: 5 | OUTPATIENT
Start: 2018-01-26 | End: 2018-06-28

## 2018-01-26 RX ORDER — METFORMIN HYDROCHLORIDE 500 MG/1
TABLET ORAL
Qty: 60 TAB | Refills: 0 | Status: SHIPPED | OUTPATIENT
Start: 2018-01-26 | End: 2018-02-24 | Stop reason: SDUPTHER

## 2018-01-26 RX ORDER — AMLODIPINE BESYLATE 5 MG/1
TABLET ORAL
Qty: 30 TAB | Refills: 5 | Status: SHIPPED | OUTPATIENT
Start: 2018-01-26 | End: 2018-04-24 | Stop reason: SDUPTHER

## 2018-01-26 RX ORDER — ESCITALOPRAM OXALATE 20 MG/1
TABLET ORAL
Qty: 60 TAB | Refills: 5 | Status: SHIPPED | OUTPATIENT
Start: 2018-01-26 | End: 2018-08-08 | Stop reason: SDUPTHER

## 2018-01-26 RX ORDER — DOXEPIN HYDROCHLORIDE 25 MG/1
CAPSULE ORAL
Qty: 30 CAP | Refills: 5 | Status: SHIPPED | OUTPATIENT
Start: 2018-01-26 | End: 2018-07-24 | Stop reason: SDUPTHER

## 2018-01-26 RX ORDER — PRAVASTATIN SODIUM 40 MG/1
TABLET ORAL
Qty: 30 TAB | Refills: 5 | Status: SHIPPED | OUTPATIENT
Start: 2018-01-26 | End: 2018-04-24 | Stop reason: SDUPTHER

## 2018-01-26 RX ORDER — OMEPRAZOLE 20 MG/1
CAPSULE, DELAYED RELEASE ORAL
Qty: 60 CAP | Refills: 5 | Status: SHIPPED | OUTPATIENT
Start: 2018-01-26 | End: 2020-01-08 | Stop reason: SDUPTHER

## 2018-01-26 RX ORDER — CETIRIZINE HCL 10 MG
10 TABLET ORAL DAILY
Qty: 30 TAB | Refills: 0 | Status: CANCELLED | OUTPATIENT
Start: 2018-01-26

## 2018-01-26 NOTE — TELEPHONE ENCOUNTER
Patient will need be using [facility] for medications. She states that she has $3000 deductible that will need to be met before her medications will be covered. She is working with her Pharmacy in regards to coupons and patience assistance. Medications have been sent to her [facility].

## 2018-04-24 RX ORDER — LAMOTRIGINE 100 MG/1
100 TABLET ORAL DAILY
Qty: 90 TAB | Refills: 1 | Status: SHIPPED | OUTPATIENT
Start: 2018-04-24 | End: 2019-02-27 | Stop reason: SDUPTHER

## 2018-04-24 RX ORDER — AMLODIPINE BESYLATE 5 MG/1
TABLET ORAL
Qty: 90 TAB | Refills: 1 | Status: SHIPPED | OUTPATIENT
Start: 2018-04-24 | End: 2019-02-26 | Stop reason: SDUPTHER

## 2018-04-24 RX ORDER — PRAVASTATIN SODIUM 40 MG/1
TABLET ORAL
Qty: 90 TAB | Refills: 1 | Status: SHIPPED | OUTPATIENT
Start: 2018-04-24 | End: 2019-04-15 | Stop reason: SDUPTHER

## 2018-04-25 RX ORDER — AMLODIPINE BESYLATE 5 MG/1
TABLET ORAL
Qty: 90 TAB | Refills: 1 | OUTPATIENT
Start: 2018-04-25

## 2018-04-25 RX ORDER — LAMOTRIGINE 100 MG/1
100 TABLET ORAL DAILY
Qty: 90 TAB | Refills: 1 | OUTPATIENT
Start: 2018-04-25

## 2018-06-28 ENCOUNTER — OFFICE VISIT (OUTPATIENT)
Dept: FAMILY MEDICINE CLINIC | Age: 63
End: 2018-06-28

## 2018-06-28 VITALS
BODY MASS INDEX: 41.62 KG/M2 | SYSTOLIC BLOOD PRESSURE: 110 MMHG | RESPIRATION RATE: 18 BRPM | HEART RATE: 78 BPM | WEIGHT: 259 LBS | DIASTOLIC BLOOD PRESSURE: 70 MMHG | HEIGHT: 66 IN | TEMPERATURE: 98.2 F | OXYGEN SATURATION: 98 %

## 2018-06-28 DIAGNOSIS — M62.830 LUMBAR PARASPINAL MUSCLE SPASM: Primary | ICD-10-CM

## 2018-06-28 PROBLEM — E66.01 OBESITY, MORBID (HCC): Status: ACTIVE | Noted: 2018-06-28

## 2018-06-28 RX ORDER — CYCLOBENZAPRINE HCL 10 MG
10 TABLET ORAL
Qty: 20 TAB | Refills: 0 | Status: SHIPPED | OUTPATIENT
Start: 2018-06-28 | End: 2018-11-12 | Stop reason: ALTCHOICE

## 2018-06-28 NOTE — PROGRESS NOTES
Subjective:      Elizabeth Wheeler is a 65 y.o. female here with complaint of pain in the right lower back. Back Pain  Patient presents for presents evaluation of low back problems. Symptoms have been present for 1 week and include pain in right lower back which is intermittent and described as a spasm. At is worst pain is 8/10 in intensity, currently 2/10 in intensity. Initial inciting event: none. Alleviating factors identifiable by patient are none. Exacerbating factors identifiable by patient are certain movements. Treatments so far initiated by patient: Tylenol, ibuprofen with no significant relief. Previous lower back problems: has lumbar DDD. Denies fever, chills, bowel or bladder incontinence, dysuria, paresthesias, LE weakness. Current Outpatient Prescriptions   Medication Sig Dispense Refill    amLODIPine (NORVASC) 5 mg tablet TAKE 1 TABLET BY MOUTH DAILY FOR PRINZMETAL ANGINA 90 Tab 1    lamoTRIgine (LAMICTAL) 100 mg tablet Take 1 Tab by mouth daily. 90 Tab 1    pravastatin (PRAVACHOL) 40 mg tablet TAKE 1 TABLET BY MOUTH EVERY NIGHT 90 Tab 1    metFORMIN (GLUCOPHAGE) 500 mg tablet TAKE 1 TABLET BY MOUTH TWICE DAILY WITH MEALS 60 Tab 5    doxepin (SINEQUAN) 25 mg capsule TAKE 1 CAPSULE BY MOUTH AT BEDTIME 30 Cap 5    escitalopram oxalate (LEXAPRO) 20 mg tablet TAKE 2 TABLETS BY MOUTH DAILY 60 Tab 5    levothyroxine (SYNTHROID) 175 mcg tablet TAKE 1 TABLET BY MOUTH DAILY ( BEFORE BREAKFAST ) 30 Tab 5    omeprazole (PRILOSEC) 20 mg capsule TAKE 1 CAPSULE BY MOUTH TWICE DAILY 60 Cap 5    cetirizine (ZYRTEC) 10 mg tablet Take 1 Tab by mouth daily. Indications: ALLERGIC RHINITIS 30 Tab 2    pregabalin (LYRICA) 150 mg capsule Take 1 Cap by mouth two (2) times a day.  Max Daily Amount: 300 mg. 60 Cap 5    VYVANSE 60 mg capsule TAKE ONE CAPSULE BY MOUTH EVERY DAY  0       Allergies   Allergen Reactions    No Allergy Information Available Other (comments)     unresponsive    No Known Drug Allergies Unknown (comments)     NKDA per pt's        Past Medical History:   Diagnosis Date    Depression     Diabetes ([address]     Gastrointestinal disorder     gerd    High cholesterol     Obese     Other ill-defined conditions(799.89)     fibromyalgia    Psychiatric disorder     depression       Social History   Substance Use Topics    Smoking status: Former Smoker    Smokeless tobacco: Never Used    Alcohol use No      Comment: rarely         Review of Systems  Pertinent items are noted in HPI. Objective:     Visit Vitals    /70 (BP 1 Location: Right arm, BP Patient Position: Sitting)  Comment: Manual    Pulse 78    Temp 98.2 °F (36.8 °C) (Oral)  Comment: .     Resp 18    Ht 5' 6\" (1.676 m)    Wt 259 lb (117.5 kg)    SpO2 98%    BMI 41.8 kg/m2      General appearance - alert, well appearing, and in no distress  Chest - clear to auscultation, no wheezes, rales or rhonchi, symmetric air entry, no tachypnea, retractions or cyanosis  Heart - normal rate, regular rhythm, normal S1, S2, no murmurs, rubs, clicks or gallops  Neurological - alert, oriented, normal speech, no focal findings or movement disorder noted  Extremities: peripheral pulses normal, no pedal edema, no clubbing or cyanosis  Musculoskeletal - Back:    Posture: Normal   Deformity: None    ROM:     Flexion: Normal    Extension: Normal     Lateral bending: Normal      Gait: Slightly antalgic      Palpation:    L1-L5: No tenderness    Sacrum: No tenderness    Coccyx: No tenderness    Left Paraspinal: No tenderness    Right Paraspinal: Tenderness present      Strength (0-5/5)    Knee Extension:  Left: 5/5  Right: 5/5    Knee Flexion:   Left: 5/5  Right: 5/5    Ankle dorsiflexion:  Left: 5/5  Right: 5/5    Ankle plantarflexion:  Left: 5/5  Right: 5/5    Great toe extension:  Left: 5/5  Right: 5/5     Sensation: Intact, no deficits      Special test:    Straight leg: Left: Negative  Right: Negative     Assessment/Plan: Alex Hernandes is a 65 y.o. female seen for:     1. Lumbar paraspinal muscle spasm: will treat with muscle relaxant therapy as below. Discussed alteration of ice and cold compresses to the right lower back. Gentle home stretches. Return should symptoms worsen or fail to improve as expected. - cyclobenzaprine (FLEXERIL) 10 mg tablet; Take 1 Tab by mouth three (3) times daily as needed for Muscle Spasm(s). Indications: Muscle Spasm  Dispense: 20 Tab; Refill: 0    I have discussed the diagnosis with the patient and the intended plan as seen in the above orders. The patient has received an after-visit summary and questions were answered concerning future plans. I have discussed medication side effects and warnings with the patient as well. Patient verbalizes understanding of plan of care and denies further questions or concerns at this time. Informed patient to return to the office if symptoms worsen or if new symptoms arise. Follow-up Disposition:  Return if symptoms worsen or fail to improve.

## 2018-06-28 NOTE — PROGRESS NOTES
Identified pt with two pt identifiers(name and ). Chief Complaint   Patient presents with    Back Pain     Upon certain movements it feels like it catches. Right side. Began about a week ago        Health Maintenance Due   Topic    EYE EXAM RETINAL OR DILATED Q1     ZOSTER VACCINE AGE 60>     HEMOGLOBIN A1C Q6M     BREAST CANCER SCRN MAMMOGRAM     MICROALBUMIN Q1     LIPID PANEL Q1     FOOT EXAM Q1        Wt Readings from Last 3 Encounters:   18 259 lb (117.5 kg)   17 250 lb (113.4 kg)   17 243 lb (110.2 kg)     Temp Readings from Last 3 Encounters:   18 98.2 °F (36.8 °C) (Oral)   17 97.9 °F (36.6 °C) (Oral)   17 98.7 °F (37.1 °C) (Oral)     BP Readings from Last 3 Encounters:   18 110/70   17 128/72   17 114/74     Pulse Readings from Last 3 Encounters:   18 78   17 78   17 88         Learning Assessment:  :     Learning Assessment 2014   PRIMARY LEARNER Patient   HIGHEST LEVEL OF EDUCATION - PRIMARY LEARNER  DID NOT GRADUATE HIGH SCHOOL   BARRIERS PRIMARY LEARNER NONE   CO-LEARNER CAREGIVER No   PRIMARY LANGUAGE ENGLISH   LEARNER PREFERENCE PRIMARY OTHER (COMMENT)   ANSWERED BY patient   RELATIONSHIP SELF       Depression Screening:  :     PHQ over the last two weeks 10/11/2017   PHQ Not Done Active Diagnosis of Depression or Bipolar Disorder   Little interest or pleasure in doing things -   Feeling down, depressed or hopeless -   Total Score PHQ 2 -       Fall Risk Assessment:  :     Fall Risk Assessment, last 12 mths 10/11/2017   Able to walk? Yes   Fall in past 12 months? No       Abuse Screening:  :     Abuse Screening Questionnaire 10/11/2017 10/10/2016 2015   Do you ever feel afraid of your partner? N N N   Are you in a relationship with someone who physically or mentally threatens you? N N N   Is it safe for you to go home?  Sudeep Glynn       Coordination of Care Questionnaire:  :     1) Have you been to an emergency room, urgent care clinic since your last visit? no   Hospitalized since your last visit? no             2) Have you seen or consulted any other health care providers outside of [address] [place] [address] since your last visit? no  (Include any pap smears or colon screenings in this section.)    3) Do you have an Advance Directive on file? no  Are you interested in receiving information about Advance Directives? no    Reviewed record in preparation for visit and have obtained necessary documentation. Medication reconciliation up to date and corrected with patient at this time.

## 2018-06-28 NOTE — MR AVS SNAPSHOT
[address] [address] [address] [facility] 
[phone] Patient: Vladislav Sinclair MRN: [id number] Visit Information Date & Time Provider Department Dept. Phone Encounter #  
 6/28/2018  8:15 AM Althea FernandezJaya [phone] [id number] Follow-up Instructions Return if symptoms worsen or fail to improve. Upcoming Health Maintenance Date Due  
 EYE EXAM RETINAL OR DILATED Q1 8/17/1966 ZOSTER VACCINE AGE 60> 6/17/2016 HEMOGLOBIN A1C Q6M 12/26/2017 BREAST CANCER SCRN MAMMOGRAM 12/30/2017 MICROALBUMIN Q1 6/26/2018 LIPID PANEL Q1 6/26/2018 FOOT EXAM Q1 7/3/2018 Pneumococcal 19-64 Medium Risk (1 of 1 - PPSV23) 8/23/2021* Influenza Age 5 to Adult 8/1/2018 PAP AKA CERVICAL CYTOLOGY 11/19/2018 COLONOSCOPY 1/21/2019 DTaP/Tdap/Td series (2 - Td) 9/1/2023 *Topic was postponed. The date shown is not the original due date. Allergies as of 6/28/2018  Review Complete On: 6/28/2018 By: Althea Fernandez MD  
  
 Severity Noted Reaction Type Reactions No Allergy Information Available  09/04/2011    Other (comments)  
 unresponsive No Known Drug Allergies  09/04/2011    Unknown (comments) NKDA per pt's  Current Immunizations  Reviewed on 10/10/2016 Name Date Influenza Vaccine (Quad) 11/19/2015 11:47 AM  
 Influenza Vaccine (Quad) PF 11/7/2017, 10/10/2016, 1/28/2015 Tdap 9/1/2013  6:31 PM  
  
 Not reviewed this visit You Were Diagnosed With   
  
 Codes Comments Lumbar paraspinal muscle spasm    -  Primary ICD-10-CM: N33.297 ICD-9-CM: 724.8 Vitals BP Pulse Temp Resp Height(growth percentile) Weight(growth percentile) 110/70 (BP 1 Location: Right arm, BP Patient Position: Sitting) 78 98.2 °F (36.8 °C) (Oral) 18 5' 6\" (1.676 m) 259 lb (117.5 kg) SpO2 BMI OB Status Smoking Status 98% 41.8 kg/m2 Hysterectomy Former Smoker Vitals History BMI and BSA Data Body Mass Index Body Surface Area  
 41.8 kg/m 2 2.34 m 2 Preferred Pharmacy Pharmacy Name Phone [id number] [address], [address] [phone] Your Updated Medication List  
  
   
This list is accurate as of 6/28/18  8:42 AM.  Always use your most recent med list. amLODIPine 5 mg tablet Commonly known as:  Janeth Lydia TAKE 1 TABLET BY MOUTH DAILY FOR PRINZMETAL ANGINA  
  
 cetirizine 10 mg tablet Commonly known as:  ZYRTEC Take 1 Tab by mouth daily. Indications: ALLERGIC RHINITIS  
  
 cyclobenzaprine 10 mg tablet Commonly known as:  FLEXERIL Take 1 Tab by mouth three (3) times daily as needed for Muscle Spasm(s). Indications: Muscle Spasm  
  
 doxepin 25 mg capsule Commonly known as:  SINEquan TAKE 1 CAPSULE BY MOUTH AT BEDTIME  
  
 escitalopram oxalate 20 mg tablet Commonly known as:  Salguero Calk TAKE 2 TABLETS BY MOUTH DAILY  
  
 lamoTRIgine 100 mg tablet Commonly known as: LaMICtal  
Take 1 Tab by mouth daily. levothyroxine 175 mcg tablet Commonly known as:  SYNTHROID  
TAKE 1 TABLET BY MOUTH DAILY ( BEFORE BREAKFAST )  
  
 metFORMIN 500 mg tablet Commonly known as:  GLUCOPHAGE  
TAKE 1 TABLET BY MOUTH TWICE DAILY WITH MEALS  
  
 omeprazole 20 mg capsule Commonly known as:  PRILOSEC  
TAKE 1 CAPSULE BY MOUTH TWICE DAILY pravastatin 40 mg tablet Commonly known as:  PRAVACHOL  
TAKE 1 TABLET BY MOUTH EVERY NIGHT Prescriptions Sent to Pharmacy Refills  
 cyclobenzaprine (FLEXERIL) 10 mg tablet 0 Sig: Take 1 Tab by mouth three (3) times daily as needed for Muscle Spasm(s). Indications: Muscle Spasm Class: Normal  
 Pharmacy: [facility]/pharmacy [address], [address] [place] [address] Ph #: [phone] Route: Oral  
  
Follow-up Instructions Return if symptoms worsen or fail to improve. Patient Instructions Back Pain: Care Instructions Your Care Instructions Back pain has many possible causes. It is often related to problems with muscles and ligaments of the back. It may also be related to problems with the nerves, discs, or bones of the back. Moving, lifting, standing, sitting, or sleeping in an awkward way can strain the back. Sometimes you don't notice the injury until later. Arthritis is another common cause of back pain. Although it may hurt a lot, back pain usually improves on its own within several weeks. Most people recover in 12 weeks or less. Using good home treatment and being careful not to stress your back can help you feel better sooner. Follow-up care is a key part of your treatment and safety. Be sure to make and go to all appointments, and call your doctor if you are having problems. It's also a good idea to know your test results and keep a list of the medicines you take. How can you care for yourself at home? · Sit or lie in positions that are most comfortable and reduce your pain. Try one of these positions when you lie down: ¨ Lie on your back with your knees bent and supported by large pillows. ¨ Lie on the floor with your legs on the seat of a sofa or chair. Rudolpho  on your side with your knees and hips bent and a pillow between your legs. ¨ Lie on your stomach if it does not make pain worse. · Do not sit up in bed, and avoid soft couches and twisted positions. Bed rest can help relieve pain at first, but it delays healing. Avoid bed rest after the first day of back pain. · Change positions every 30 minutes. If you must sit for long periods of time, take breaks from sitting. Get up and walk around, or lie in a comfortable position. · Try using a heating pad on a low or medium setting for 15 to 20 minutes every 2 or 3 hours. Try a warm shower in place of one session with the heating pad. · You can also try an ice pack for 10 to 15 minutes every 2 to 3 hours. Put a thin cloth between the ice pack and your skin. · Take pain medicines exactly as directed. ¨ If the doctor gave you a prescription medicine for pain, take it as prescribed. ¨ If you are not taking a prescription pain medicine, ask your doctor if you can take an over-the-counter medicine. · Take short walks several times a day. You can start with 5 to 10 minutes, 3 or 4 times a day, and work up to longer walks. Walk on level surfaces and avoid hills and stairs until your back is better. · Return to work and other activities as soon as you can. Continued rest without activity is usually not good for your back. · To prevent future back pain, do exercises to stretch and strengthen your back and stomach. Learn how to use good posture, safe lifting techniques, and proper body mechanics. When should you call for help? Call your doctor now or seek immediate medical care if: 
? · You have new or worsening numbness in your legs. ? · You have new or worsening weakness in your legs. (This could make it hard to stand up.) ? · You lose control of your bladder or bowels. ? Watch closely for changes in your health, and be sure to contact your doctor if: 
? · Your pain gets worse. ? · You are not getting better after 2 weeks. Where can you learn more? Go to http://kelli-carl.info/. Enter I913 in the search box to learn more about \"Back Pain: Care Instructions. \" Current as of: March 21, 2017 Content Version: 11.4 © 5845-2888 [employer]. Care instructions adapted under license by [employer] (which disclaims liability or warranty for this information). If you have questions about a medical condition or this instruction, always ask your healthcare professional. [address] [address] any warranty or liability for your use of this information. Introducing [facility] SERVICES! [facility] introduces [employer] patient portal. Now you can access parts of your medical record, email your doctor's office, and request medication refills online. 1. In your internet browser, go to [email or web address] 2. Click on the First Time User? Click Here link in the Sign In box. You will see the New Member Sign Up page. 3. Enter your [employer] Access Code exactly as it appears below. You will not need to use this code after youve completed the sign-up process. If you do not sign up before the expiration date, you must request a new code. · [employer] Access Code: [email or web address] Expires: 9/26/2018  8:42 AM 
 
4. Enter the last four digits of your Social Security Number (xxxx) and Date of Birth (mm/dd/yyyy) as indicated and click Submit. You will be taken to the next sign-up page. 5. Create a [employer] ID. This will be your [employer] login ID and cannot be changed, so think of one that is secure and easy to remember. 6. Create a [employer] password. You can change your password at any time. 7. Enter your Password Reset Question and Answer. This can be used at a later time if you forget your password. 8. Enter your e-mail address. You will receive e-mail notification when new information is available in [address] 9. Click Sign Up. You can now view and download portions of your medical record. 10. Click the Download Summary menu link to download a portable copy of your medical information. If you have questions, please visit the Frequently Asked Questions section of the [employer] website. Remember, [employer] is NOT to be used for urgent needs. For medical emergencies, dial 911. Now available from your iPhone and Android! Please provide this summary of care documentation to your next provider. Your primary care clinician is listed as Kusum Camacho. If you have any questions after today's visit, please call [phone].

## 2018-06-28 NOTE — PATIENT INSTRUCTIONS

## 2018-07-25 RX ORDER — DOXEPIN HYDROCHLORIDE 25 MG/1
CAPSULE ORAL
Qty: 30 CAP | Refills: 5 | Status: SHIPPED | OUTPATIENT
Start: 2018-07-25 | End: 2019-02-11 | Stop reason: SDUPTHER

## 2018-08-03 RX ORDER — METFORMIN HYDROCHLORIDE 500 MG/1
TABLET ORAL
Qty: 180 TAB | Refills: 1 | Status: SHIPPED | OUTPATIENT
Start: 2018-08-03 | End: 2019-02-26 | Stop reason: SDUPTHER

## 2018-08-07 DIAGNOSIS — F34.1 DYSTHYMIA: ICD-10-CM

## 2018-08-08 RX ORDER — ESCITALOPRAM OXALATE 20 MG/1
TABLET ORAL
Qty: 180 TAB | Refills: 1 | Status: SHIPPED | OUTPATIENT
Start: 2018-08-08 | End: 2019-05-24 | Stop reason: SDUPTHER

## 2018-10-01 ENCOUNTER — OFFICE VISIT (OUTPATIENT)
Dept: FAMILY MEDICINE CLINIC | Age: 63
End: 2018-10-01

## 2018-10-01 ENCOUNTER — HOSPITAL ENCOUNTER (OUTPATIENT)
Dept: GENERAL RADIOLOGY | Age: 63
Discharge: HOME | End: 2018-10-01
Attending: FAMILY MEDICINE
Payer: COMMERCIAL

## 2018-10-01 VITALS
TEMPERATURE: 97.9 F | OXYGEN SATURATION: 98 % | DIASTOLIC BLOOD PRESSURE: 60 MMHG | WEIGHT: 260.8 LBS | HEIGHT: 66 IN | HEART RATE: 71 BPM | SYSTOLIC BLOOD PRESSURE: 120 MMHG | RESPIRATION RATE: 20 BRPM | BODY MASS INDEX: 41.91 KG/M2

## 2018-10-01 DIAGNOSIS — I10 ESSENTIAL HYPERTENSION: ICD-10-CM

## 2018-10-01 DIAGNOSIS — E03.9 ACQUIRED HYPOTHYROIDISM: Chronic | ICD-10-CM

## 2018-10-01 DIAGNOSIS — E78.00 HYPERCHOLESTEROLEMIA: ICD-10-CM

## 2018-10-01 DIAGNOSIS — Z23 ENCOUNTER FOR IMMUNIZATION: ICD-10-CM

## 2018-10-01 DIAGNOSIS — E11.9 TYPE 2 DIABETES MELLITUS WITHOUT COMPLICATION, WITHOUT LONG-TERM CURRENT USE OF INSULIN (HCC): ICD-10-CM

## 2018-10-01 DIAGNOSIS — Z79.899 ENCOUNTER FOR LONG-TERM CURRENT USE OF MEDICATION: ICD-10-CM

## 2018-10-01 DIAGNOSIS — G89.29 RIGHT FLANK PAIN, CHRONIC: Primary | ICD-10-CM

## 2018-10-01 DIAGNOSIS — R10.9 RIGHT FLANK PAIN, CHRONIC: Primary | ICD-10-CM

## 2018-10-01 DIAGNOSIS — R10.9 RIGHT FLANK PAIN, CHRONIC: ICD-10-CM

## 2018-10-01 DIAGNOSIS — G89.29 RIGHT FLANK PAIN, CHRONIC: ICD-10-CM

## 2018-10-01 LAB
ALBUMIN UR QL STRIP: 10 MG/L
CREATININE, URINE POC: 200 MG/DL
MICROALBUMIN/CREAT RATIO POC: <30 MG/G

## 2018-10-01 PROCEDURE — 72072 X-RAY EXAM THORAC SPINE 3VWS: CPT

## 2018-10-01 PROCEDURE — 72100 X-RAY EXAM L-S SPINE 2/3 VWS: CPT

## 2018-10-01 RX ORDER — MELOXICAM 15 MG/1
15 TABLET ORAL DAILY
Qty: 30 TAB | Refills: 3 | Status: SHIPPED | OUTPATIENT
Start: 2018-10-01 | End: 2018-11-12 | Stop reason: ALTCHOICE

## 2018-10-01 NOTE — PATIENT INSTRUCTIONS
Diabetes and Preventing Falls: Care Instructions Your Care Instructions If you are an older adult who has diabetes, you may have a higher risk of falling. Complications of diabetes-such as nerve damage, foot problems, and reduced vision-may increase your risk of a fall. Some of your medicines also may add to your risk. By making your home safer, you can lower your risk of falling. Doing things to prevent diabetes complications may also help to lower your risk. You can make your home safer with a few simple measures. Follow-up care is a key part of your treatment and safety. Be sure to make and go to all appointments, and call your doctor if you are having problems. It's also a good idea to know your test results and keep a list of the medicines you take. How can you care for yourself at home? Taking care of yourself · Keep your blood sugar at a target level (which you set with your doctor). · Exercise regularly to improve your strength, muscle tone, and balance. Walk if you can. Swimming may be a good choice if you cannot walk easily. · Have your vision checked as often as your doctor recommends. It is usually once a year or more often if you have eye problems. · Know the side effects of the medicines you take. Ask your doctor or pharmacist whether the medicines you take can affect your balance. Sleeping pills or sedatives can affect your balance. · Limit the amount of alcohol you drink. Alcohol can impair your balance and other senses. · Have your doctor check your feet during each visit. If you have a foot problem, see your doctor. Preventing falls at home · Remove raised doorway thresholds, throw rugs, and clutter. Repair loose carpet or raised areas in the floor. · Move furniture and electrical cords to keep them out of walking paths. · Use nonskid floor wax, and wipe up spills right away, especially on ceramic tile floors. · If you use a walker or cane, put rubber tips on it. If you use crutches, clean the bottoms of them regularly with an abrasive pad, such as steel wool. · Keep your house well lit, especially [place], and outside walkways. Use night-lights in areas such as hallways and bathrooms. Add extra light switches or use remote switches (such as switches that go on or off when you clap your hands) to make it easier to turn lights on if you have to get up during the night. · Install sturdy handrails on stairways. Put grab bars near your shower, bathtub, and toilet. · Store household items on low shelves so that you do not have to climb or reach high. Or use a reaching device that you can get at a medical supply store. If you have to climb for something, use a step stool with handrails, or ask someone to get it for you. · Keep a cordless phone and a flashlight with new batteries by your bed. If possible, put a phone in each of the main rooms of your house, or carry a cell phone in case you fall and cannot reach a phone. Or you can wear a device around your neck or wrist. You push a button that sends a signal for help. · Wear low-heeled shoes that fit well and give your feet good support. Use footwear with nonskid soles. Check the heels and soles of your shoes for wear. Repair or replace worn heels or soles. · Do not wear socks without shoes on wood floors. · Walk on the grass when the sidewalks are slippery. If you live in an area that gets snow and ice in the winter, sprinkle salt on slippery steps and sidewalks. Where can you learn more? Go to http://kelli-carl.info/. Enter C540 in the search box to learn more about \"Diabetes and Preventing Falls: Care Instructions. \" Current as of: May 12, 2017 Content Version: 11.7 © 4199-6643 [employer].  Care instructions adapted under license by [employer] (which disclaims liability or warranty for this information). If you have questions about a medical condition or this instruction, always ask your healthcare professional. [address] any warranty or liability for your use of this information. Body Mass Index: Care Instructions Your Care Instructions Body mass index (BMI) can help you see if your weight is raising your risk for health problems. It uses a formula to compare how much you weigh with how tall you are. · A BMI lower than 18.5 is considered underweight. · A BMI between 18.5 and 24.9 is considered healthy. · A BMI between 25 and 29.9 is considered overweight. A BMI of 30 or higher is considered obese. If your BMI is in the normal range, it means that you have a lower risk for weight-related health problems. If your BMI is in the overweight or obese range, you may be at increased risk for weight-related health problems, such as high blood pressure, heart disease, stroke, arthritis or joint pain, and diabetes. If your BMI is in the underweight range, you may be at increased risk for health problems such as fatigue, lower protection (immunity) against illness, muscle loss, bone loss, hair loss, and hormone problems. BMI is just one measure of your risk for weight-related health problems. You may be at higher risk for health problems if you are not active, you eat an unhealthy diet, or you drink too much alcohol or use tobacco products. Follow-up care is a key part of your treatment and safety. Be sure to make and go to all appointments, and call your doctor if you are having problems. It's also a good idea to know your test results and keep a list of the medicines you take. How can you care for yourself at home? · Practice healthy eating habits. This includes eating plenty of fruits, vegetables, whole grains, lean protein, and low-fat dairy. · If your doctor recommends it, get more exercise.  Walking is a good choice. Bit by bit, increase the amount you walk every day. Try for at least 30 minutes on most days of the week. · Do not smoke. Smoking can increase your risk for health problems. If you need help quitting, talk to your doctor about stop-smoking programs and medicines. These can increase your chances of quitting for good. · Limit alcohol to 2 drinks a day for men and 1 drink a day for women. Too much alcohol can cause health problems. If you have a BMI higher than 25 · Your doctor may do other tests to check your risk for weight-related health problems. This may include measuring the distance around your waist. A waist measurement of more than 40 inches in men or 35 inches in women can increase the risk of weight-related health problems. · Talk with your doctor about steps you can take to stay healthy or improve your health. You may need to make lifestyle changes to lose weight and stay healthy, such as changing your diet and getting regular exercise. If you have a BMI lower than 18.5 · Your doctor may do other tests to check your risk for health problems. · Talk with your doctor about steps you can take to stay healthy or improve your health. You may need to make lifestyle changes to gain or maintain weight and stay healthy, such as getting more healthy foods in your diet and doing exercises to build muscle. Where can you learn more? Go to http://kelli-carl.info/. Enter S176 in the search box to learn more about \"Body Mass Index: Care Instructions. \" Current as of: October 13, 2016 Content Version: 11.4 © 8830-9160 Healthwise, [employer]. Care instructions adapted under license by [employer] (which disclaims liability or warranty for this information). If you have questions about a medical condition or this instruction, always ask your healthcare professional. [place] [address] any warranty or liability for your use of this information.

## 2018-10-01 NOTE — MR AVS SNAPSHOT
[address] [place] [address] [address] [facility] 
[phone] Patient: Nasrin Glasgow MRN: [id number] NPS:[id number] Visit Information Date & Time Provider Department Dept. Phone Encounter #  
 [id number] 55:42 AM Jaya Cortez [phone] [id number] Upcoming Health Maintenance Date Due  
 EYE EXAM RETINAL OR DILATED Q1 8/17/1966 Shingrix Vaccine Age 50> (1 of 2) 8/17/2006 HEMOGLOBIN A1C Q6M 12/26/2017 BREAST CANCER SCRN MAMMOGRAM 12/30/2017 MICROALBUMIN Q1 6/26/2018 LIPID PANEL Q1 6/26/2018 Influenza Age 5 to Adult 8/1/2018 PAP AKA CERVICAL CYTOLOGY 11/19/2018 COLONOSCOPY 1/21/2019 Pneumococcal 19-64 Medium Risk (1 of 1 - PPSV23) 8/23/2021* FOOT EXAM Q1 10/1/2019 DTaP/Tdap/Td series (2 - Td) 9/1/2023 *Topic was postponed. The date shown is not the original due date. Allergies as of 10/1/2018  Review Complete On: 53/0/0217 By: Sandra Campbell MD  
  
 Severity Noted Reaction Type Reactions No Allergy Information Available  09/04/2011    Other (comments)  
 unresponsive No Known Drug Allergies  09/04/2011    Unknown (comments) NKDA per pt's  Current Immunizations  Reviewed on 10/1/2018 Name Date Influenza Vaccine (Quad) 11/19/2015 11:47 AM  
 Influenza Vaccine (Quad) PF  Incomplete, 11/7/2017, 10/10/2016, 1/28/2015 Tdap 9/1/2013  6:31 PM  
  
 Reviewed by Sandra Campbell MD on 10/1/2018 at 11:46 AM  
You Were Diagnosed With   
  
 Codes Comments Right flank pain, chronic    -  Primary ICD-10-CM: R10.9, G89.29 ICD-9-CM: 789.09, 338.29 Type 2 diabetes mellitus without complication, without long-term current use of insulin (HCC)     ICD-10-CM: E11.9 ICD-9-CM: 250.00 Acquired hypothyroidism     ICD-10-CM: E03.9 ICD-9-CM: 244.9 Essential hypertension     ICD-10-CM: I10 
ICD-9-CM: 401.9 Hypercholesterolemia     ICD-10-CM: E78.00 ICD-9-CM: 272.0 Encounter for long-term current use of medication     ICD-10-CM: Z79.899 ICD-9-CM: V58.69 Encounter for immunization     ICD-10-CM: I24 ICD-9-CM: V03.89 Vitals BP Pulse Temp Resp Height(growth percentile) Weight(growth percentile) 120/60 (BP 1 Location: Left arm, BP Patient Position: Sitting) 71 97.9 °F (36.6 °C) (Oral) 20 5' 6\" (1.676 m) 260 lb 12.8 oz (118.3 kg) SpO2 BMI OB Status Smoking Status 98% 42.09 kg/m2 Hysterectomy Former Smoker BMI and BSA Data Body Mass Index Body Surface Area 42.09 kg/m 2 2.35 m 2 Preferred Pharmacy Pharmacy Name Phone [id number] [place] [phone] Your Updated Medication List  
  
   
This list is accurate as of 10/1/18 11:51 AM.  Always use your most recent med list. amLODIPine 5 mg tablet Commonly known as:  Jaymie Paget TAKE 1 TABLET BY MOUTH DAILY FOR PRINZMETAL ANGINA  
  
 cetirizine 10 mg tablet Commonly known as:  ZYRTEC Take 1 Tab by mouth daily. Indications: ALLERGIC RHINITIS  
  
 cyclobenzaprine 10 mg tablet Commonly known as:  FLEXERIL Take 1 Tab by mouth three (3) times daily as needed for Muscle Spasm(s). Indications: Muscle Spasm  
  
 doxepin 25 mg capsule Commonly known as:  SINEquan TAKE 1 CAPSULE BY MOUTH AT BEDTIME  
  
 escitalopram oxalate 20 mg tablet Commonly known as:  Lincoln Brinks TAKE 2 TABLETS BY MOUTH DAILY  
  
 lamoTRIgine 100 mg tablet Commonly known as: LaMICtal  
Take 1 Tab by mouth daily. levothyroxine 175 mcg tablet Commonly known as:  SYNTHROID  
TAKE 1 TABLET BY MOUTH DAILY ( BEFORE BREAKFAST )  
  
 meloxicam 15 mg tablet Commonly known as:  MOBIC Take 1 Tab by mouth daily. metFORMIN 500 mg tablet Commonly known as:  GLUCOPHAGE  
TAKE 1 TABLET BY MOUTH TWICE DAILY WITH MEALS  
  
 omeprazole 20 mg capsule Commonly known as:  PRILOSEC  
TAKE 1 CAPSULE BY MOUTH TWICE DAILY pravastatin 40 mg tablet Commonly known as:  PRAVACHOL  
TAKE 1 TABLET BY MOUTH EVERY NIGHT Prescriptions Sent to Pharmacy Refills  
 meloxicam (MOBIC) 15 mg tablet 3 Sig: Take 1 Tab by mouth daily. Class: Normal  
 [facility] [address], [address] [facility] #: [phone] Route: Oral  
  
We Performed the Following AMB POC URINE, MICROALBUMIN, SEMIQUANT (3 RESULTS) [25136 CPT(R)] CBC WITH AUTOMATED DIFF [17896 CPT(R)] HEMOGLOBIN A1C WITH EAG [07342 CPT(R)]  DIABETES FOOT EXAM [HM7 Custom] INFLUENZA VIRUS VAC QUAD,SPLIT,PRESV FREE SYRINGE IM [id number] CPT(R)] LIPID PANEL [42558 CPT(R)] METABOLIC PANEL, COMPREHENSIVE [64648 CPT(R)] DE IMMUNIZ ADMIN,1 SINGLE/COMB VAC/TOXOID [id number] CPT(R)] T4, FREE [id number] CPT(R)] TSH 3RD GENERATION [59859 CPT(R)] To-Do List   
 10/01/2018 Imaging:  XR SPINE LUMB 2 OR 3 V   
  
 10/01/2018 Imaging:  XR SPINE THORAC 3 V Patient Instructions Diabetes and Preventing Falls: Care Instructions Your Care Instructions If you are an older adult who has diabetes, you may have a higher risk of falling. Complications of diabetes-such as nerve damage, foot problems, and reduced vision-may increase your risk of a fall. Some of your medicines also may add to your risk. By making your home safer, you can lower your risk of falling. Doing things to prevent diabetes complications may also help to lower your risk. You can make your home safer with a few simple measures. Follow-up care is a key part of your treatment and safety. Be sure to make and go to all appointments, and call your doctor if you are having problems. It's also a good idea to know your test results and keep a list of the medicines you take. How can you care for yourself at home? Taking care of yourself · Keep your blood sugar at a target level (which you set with your doctor). · Exercise regularly to improve your strength, muscle tone, and balance. Walk if you can. Swimming may be a good choice if you cannot walk easily. · Have your vision checked as often as your doctor recommends. It is usually once a year or more often if you have eye problems. · Know the side effects of the medicines you take. Ask your doctor or pharmacist whether the medicines you take can affect your balance. Sleeping pills or sedatives can affect your balance. · Limit the amount of alcohol you drink. Alcohol can impair your balance and other senses. · Have your doctor check your feet during each visit. If you have a foot problem, see your doctor. Preventing falls at home · Remove raised doorway thresholds, throw rugs, and clutter. Repair loose carpet or raised areas in the floor. · Move furniture and electrical cords to keep them out of walking paths. · Use nonskid floor wax, and wipe up spills right away, especially on ceramic tile floors. · If you use a walker or cane, put rubber tips on it. If you use crutches, clean the bottoms of them regularly with an abrasive pad, such as steel wool. · Keep your house well lit, especially [place], and outside walkways. Use night-lights in areas such as hallways and bathrooms. Add extra light switches or use remote switches (such as switches that go on or off when you clap your hands) to make it easier to turn lights on if you have to get up during the night. · Install sturdy handrails on stairways. Put grab bars near your shower, bathtub, and toilet. · Store household items on low shelves so that you do not have to climb or reach high. Or use a reaching device that you can get at a medical supply store. If you have to climb for something, use a step stool with handrails, or ask someone to get it for you. · Keep a cordless phone and a flashlight with new batteries by your bed. If possible, put a phone in each of the main rooms of your house, or carry a cell phone in case you fall and cannot reach a phone. Or you can wear a device around your neck or wrist. You push a button that sends a signal for help. · Wear low-heeled shoes that fit well and give your feet good support. Use footwear with nonskid soles. Check the heels and soles of your shoes for wear. Repair or replace worn heels or soles. · Do not wear socks without shoes on wood floors. · Walk on the grass when the sidewalks are slippery. If you live in an area that gets snow and ice in the winter, sprinkle salt on slippery steps and sidewalks. Where can you learn more? Go to http://kelli-carl.info/. Enter H529 in the search box to learn more about \"Diabetes and Preventing Falls: Care Instructions. \" Current as of: May 12, 2017 Content Version: 11.7 © 1320-3965 [employer]. Care instructions adapted under license by [employer] (which disclaims liability or warranty for this information). If you have questions about a medical condition or this instruction, always ask your healthcare professional. [place] [address] any warranty or liability for your use of this information. Introducing [facility]! [facility] introduces [employer] patient portal. Now you can access parts of your medical record, email your doctor's office, and request medication refills online. 1. In your internet browser, go to [email or web address] 2. Click on the First Time User? Click Here link in the Sign In box. You will see the New Member Sign Up page. 3. Enter your [employer] Access Code exactly as it appears below. You will not need to use this code after youve completed the sign-up process. If you do not sign up before the expiration date, you must request a new code. · [employer] Access Code: [email or web address] Expires: 12/30/2018 11:49 AM 
 
 4. Enter the last four digits of your Social Security Number (xxxx) and Date of Birth (mm/dd/yyyy) as indicated and click Submit. You will be taken to the next sign-up page. 5. Create a IRL Connect ID. This will be your IRL Connect login ID and cannot be changed, so think of one that is secure and easy to remember. 6. Create a IRL Connect password. You can change your password at any time. 7. Enter your Password Reset Question and Answer. This can be used at a later time if you forget your password. 8. Enter your e-mail address. You will receive e-mail notification when new information is available in [address] 9. Click Sign Up. You can now view and download portions of your medical record. 10. Click the Download Summary menu link to download a portable copy of your medical information. If you have questions, please visit the Frequently Asked Questions section of the IRL Connect website. Remember, IRL Connect is NOT to be used for urgent needs. For medical emergencies, dial 911. Now available from your iPhone and Android! Please provide this summary of care documentation to your next provider. Your primary care clinician is listed as Tomas Yuen. If you have any questions after today's visit, please call [phone].

## 2018-10-01 NOTE — PROGRESS NOTES
Subjective:  
 
Kylie Roach is a 59 y.o. female who presents for follow up of diabetes, hypertension, hyperlipidemia and obesity. Diet and Lifestyle: generally follows a low fat low cholesterol diet, generally follows a low sodium diet, sedentary, nonsmoker Home BP Monitoring: is not measured at home Cardiovascular ROS: not taking medications regularly as instructed, no medication side effects noted, no TIA's, no chest pain on exertion, no dyspnea on exertion, no swelling of ankles. New concerns: she has stopped working. Weight gain. Stopped taking Pravastatin months ago. Due for labs. C/O worsening pain R back/ flank area. Worse with activity, getting up, turning. Check soft tissue nodule on R abdomen. She stopped taking Lyrica due to high cost. 
 
Patient Active Problem List  
 Diagnosis Date Noted  Obesity, morbid ([facility] [address] 06/28/2018  S/P TOMASA (total abdominal hysterectomy) 04/19/2016  Type II diabetes mellitus ([facility] [address] 04/19/2016  Essential hypertension 03/23/2016  Hypercholesterolemia 03/23/2016  ANNA on CPAP 07/01/2014  
 MOFFETT (nonalcoholic steatohepatitis) 06/28/2014  Lung nodule seen on imaging study 06/28/2014  Obesity 09/05/2011  Hypothyroidism 09/04/2011  Depression 09/04/2011  GERD 09/04/2011  Fibromyalgia 09/04/2011 Current Outpatient Prescriptions Medication Sig Dispense Refill  meloxicam (MOBIC) 15 mg tablet Take 1 Tab by mouth daily. 30 Tab 3  
 escitalopram oxalate (LEXAPRO) 20 mg tablet TAKE 2 TABLETS BY MOUTH DAILY 180 Tab 1  
 metFORMIN (GLUCOPHAGE) 500 mg tablet TAKE 1 TABLET BY MOUTH TWICE DAILY WITH MEALS 180 Tab 1  
 doxepin (SINEQUAN) 25 mg capsule TAKE 1 CAPSULE BY MOUTH AT BEDTIME 30 Cap 5  
 amLODIPine (NORVASC) 5 mg tablet TAKE 1 TABLET BY MOUTH DAILY FOR PRINZMETAL ANGINA 90 Tab 1  
 lamoTRIgine (LAMICTAL) 100 mg tablet Take 1 Tab by mouth daily.  90 Tab 1  
  levothyroxine (SYNTHROID) 175 mcg tablet TAKE 1 TABLET BY MOUTH DAILY ( BEFORE BREAKFAST ) 30 Tab 5  cyclobenzaprine (FLEXERIL) 10 mg tablet Take 1 Tab by mouth three (3) times daily as needed for Muscle Spasm(s). Indications: Muscle Spasm 20 Tab 0  pravastatin (PRAVACHOL) 40 mg tablet TAKE 1 TABLET BY MOUTH EVERY NIGHT 90 Tab 1  
 omeprazole (PRILOSEC) 20 mg capsule TAKE 1 CAPSULE BY MOUTH TWICE DAILY 60 Cap 5  cetirizine (ZYRTEC) 10 mg tablet Take 1 Tab by mouth daily. Indications: ALLERGIC RHINITIS 30 Tab 2 Allergies Allergen Reactions  No Allergy Information Available Other (comments)  
  unresponsive  No Known Drug Allergies Unknown (comments) NKDA per pt's  Past Medical History:  
Diagnosis Date  Depression  Diabetes ([facility] [address]  Gastrointestinal disorder   
 gerd  High cholesterol  Obese  Other ill-defined conditions(799.89)   
 fibromyalgia  Psychiatric disorder   
 depression Past Surgical History:  
Procedure Laterality Date  DELIVERY     
 X 2  
 HX GYN    
 hysterectomy  HX SEPTOPLASTY Family History Problem Relation Age of Onset  Hypertension Mother  Heart Disease Father  Cancer Brother Social History Substance Use Topics  Smoking status: Former Smoker  Smokeless tobacco: Never Used  Alcohol use No  
   Comment: rarely Review of Systems, additional: 
Pertinent items are noted in HPI. Objective:  
 
Visit Vitals  /60 (BP 1 Location: Left arm, BP Patient Position: Sitting) Comment: manual  
 Pulse 71  Temp 97.9 °F (36.6 °C) (Oral)  Resp 20  
 Ht 5' 6\" (1.676 m)  Wt 260 lb 12.8 oz (118.3 kg)  SpO2 98%  BMI 42.09 kg/m2 Appearance: alert, well appearing, and in no distress and overweight. General exam: CVS exam BP noted to be well controlled today in office, S1, S2 normal, no gallop, no murmur, chest clear, no JVD, no HSM, no edema. Diabetic foot exam:  
 
Left Foot: 
 Visual Exam: normal  
 Pulse DP: 2+ (normal) Filament test: normal sensation Right Foot: 
 Visual Exam: normal  
 Pulse DP: 2+ (normal) Filament test: normal sensation Lab review: orders written for new lab studies as appropriate; see orders. Assessment/Plan:  
 
diabetes control uncertain, hypertension stable, hyperlipidemia patient poorly compliant. orders and follow up as documented in patient record. ICD-10-CM ICD-9-CM 1. Right flank pain, chronic R10.9 789.09 XR SPINE THORAC 3 V  
 G89.29 338.29 XR SPINE LUMB 2 OR 3 V  
   meloxicam (MOBIC) 15 mg tablet 2. Type 2 diabetes mellitus without complication, without long-term current use of insulin (HCC) E11.9 250.00 AMB POC URINE, MICROALBUMIN, SEMIQUANT (3 RESULTS) HEMOGLOBIN A1C WITH EAG  
   HM DIABETES FOOT EXAM  
3. Acquired hypothyroidism E03.9 244.9 TSH 3RD GENERATION  
   T4, FREE 4. Essential hypertension I10 401.9 5. Hypercholesterolemia E78.00 272.0 LIPID PANEL 6. Encounter for long-term current use of medication Z79.899 V58.69 CBC WITH AUTOMATED DIFF  
   METABOLIC PANEL, COMPREHENSIVE 7. Encounter for immunization Z23 V03.89 INFLUENZA VIRUS VAC QUAD,SPLIT,PRESV FREE SYRINGE IM  
   [place] IMMUNIZ ADMIN,1 SINGLE/COMB VAC/TOXOID Order XR spine. Order Meloxicam. 
Labs ordered. FLU vaccine given. Get back on Pravastatin. Increase exercise. Discussed the patient's BMI with her. The BMI follow up plan is as follows:  
 
dietary management education, guidance, and counseling 
encourage exercise 
monitor weight 
prescribed dietary intake An After Visit Summary was printed and given to the patient.

## 2018-10-01 NOTE — PROGRESS NOTES
Identified pt with two pt identifiers(name and ). Chief Complaint Patient presents with  Side Pain  
  right side pain - has been going on a while but worse now - when she does anything it hurts worse and sometimes goes all the way around  Fatigue  
  she was getting a growth hormone and then they cut her off  Cyst  
  she has cyst on the same side Health Maintenance Due Topic  
 EYE EXAM RETINAL OR DILATED Q1   
 Shingrix Vaccine Age 50> (1 of 2)  HEMOGLOBIN A1C Q6M   
 BREAST CANCER SCRN MAMMOGRAM   
 MICROALBUMIN Q1   
 LIPID PANEL Q1   
 FOOT EXAM Q1   
 Influenza Age 5 to Adult  PAP AKA CERVICAL CYTOLOGY  COLONOSCOPY Wt Readings from Last 3 Encounters:  
10/01/18 260 lb 12.8 oz (118.3 kg) 18 259 lb (117.5 kg) 17 250 lb (113.4 kg) Temp Readings from Last 3 Encounters:  
10/01/18 97.9 °F (36.6 °C) (Oral) 18 98.2 °F (36.8 °C) (Oral)  
17 97.9 °F (36.6 °C) (Oral) BP Readings from Last 3 Encounters:  
10/01/18 120/60  
18 110/70  
17 128/72 Pulse Readings from Last 3 Encounters:  
10/01/18 71  
18 78  
17 78 Learning Assessment: 
:  
 
Learning Assessment 2014 PRIMARY LEARNER Patient HIGHEST LEVEL OF EDUCATION - PRIMARY LEARNER  DID NOT GRADUATE HIGH SCHOOL  
BARRIERS PRIMARY LEARNER NONE  
CO-LEARNER CAREGIVER No  
PRIMARY LANGUAGE ENGLISH  
LEARNER PREFERENCE PRIMARY OTHER (COMMENT) ANSWERED BY patient RELATIONSHIP SELF Depression Screening: 
:  
 
PHQ over the last two weeks 10/1/2018 PHQ Not Done - Little interest or pleasure in doing things Not at all Feeling down, depressed, irritable, or hopeless Several days Total Score PHQ 2 1 Fall Risk Assessment: 
:  
 
Fall Risk Assessment, last 12 mths 10/11/2017 Able to walk? Yes Fall in past 12 months? No  
 
 
Abuse Screening: 
:  
 
Abuse Screening Questionnaire 10/1/2018 10/11/2017 10/10/2016 2015 Do you ever feel afraid of your partner? N N N N Are you in a relationship with someone who physically or mentally threatens you? N N N N Is it safe for you to go home? Thomas Moser Coordination of Care Questionnaire: 
:  
 
1) Have you been to an emergency room, urgent care clinic since your last visit? no  
Hospitalized since your last visit? no          
 
2) Have you seen or consulted any other health care providers outside of [facility] since your last visit? no  (Include any pap smears or colon screenings in this section.) 3) Do you have an Advance Directive on file? no 
Are you interested in receiving information about Advance Directives? no 
 
Patient is accompanied by spouse I have received verbal consent from Riki Serna to discuss any/all medical information while they are present in the room. Reviewed record in preparation for visit and have obtained necessary documentation. Medication reconciliation up to date and corrected with patient at this time. Results for orders placed or performed in visit on 10/01/18 AMB POC URINE, MICROALBUMIN, SEMIQUANT (3 RESULTS) Result Value Ref Range ALBUMIN, URINE POC 10 Negative mg/L  
 CREATININE, URINE  mg/dL  Microalbumin/creat ratio (POC) <30 <30 MG/G

## 2018-10-02 ENCOUNTER — TELEPHONE (OUTPATIENT)
Dept: FAMILY MEDICINE CLINIC | Age: 63
End: 2018-10-02

## 2018-10-02 NOTE — TELEPHONE ENCOUNTER
Please advise pt her spine XR's show arthritis. Would she be interested in physical therapy to help with pain? I started her on Meloxicam, too.

## 2018-10-03 LAB
ALBUMIN SERPL-MCNC: 4.1 G/DL (ref 3.6–4.8)
ALBUMIN/GLOB SERPL: 1.6 {RATIO} (ref 1.2–2.2)
ALP SERPL-CCNC: 74 IU/L (ref 39–117)
ALT SERPL-CCNC: 34 IU/L (ref 0–32)
AST SERPL-CCNC: 32 IU/L (ref 0–40)
BASOPHILS # BLD AUTO: 0 X10E3/UL (ref 0–0.2)
BASOPHILS NFR BLD AUTO: 0 %
BILIRUB SERPL-MCNC: 0.4 MG/DL (ref 0–1.2)
BUN SERPL-MCNC: 7 MG/DL (ref 8–27)
BUN/CREAT SERPL: 8 (ref 12–28)
CALCIUM SERPL-MCNC: 8.9 MG/DL (ref 8.7–10.3)
CHLORIDE SERPL-SCNC: 105 MMOL/L (ref 96–106)
CHOLEST SERPL-MCNC: 177 MG/DL (ref 100–199)
CO2 SERPL-SCNC: 20 MMOL/L (ref 20–29)
CREAT SERPL-MCNC: 0.83 MG/DL (ref 0.57–1)
EOSINOPHIL # BLD AUTO: 0.1 X10E3/UL (ref 0–0.4)
EOSINOPHIL NFR BLD AUTO: 3 %
ERYTHROCYTE [DISTWIDTH] IN BLOOD BY AUTOMATED COUNT: 14.4 % (ref 12.3–15.4)
EST. AVERAGE GLUCOSE BLD GHB EST-MCNC: 120 MG/DL
GLOBULIN SER CALC-MCNC: 2.5 G/DL (ref 1.5–4.5)
GLUCOSE SERPL-MCNC: 107 MG/DL (ref 65–99)
HBA1C MFR BLD: 5.8 % (ref 4.8–5.6)
HCT VFR BLD AUTO: 39.4 % (ref 34–46.6)
HDLC SERPL-MCNC: 36 MG/DL
HGB BLD-MCNC: 12.5 G/DL (ref 11.1–15.9)
IMM GRANULOCYTES # BLD: 0 X10E3/UL (ref 0–0.1)
IMM GRANULOCYTES NFR BLD: 0 %
INTERPRETATION, 910389: NORMAL
LDLC SERPL CALC-MCNC: 107 MG/DL (ref 0–99)
LYMPHOCYTES # BLD AUTO: 1.5 X10E3/UL (ref 0.7–3.1)
LYMPHOCYTES NFR BLD AUTO: 30 %
Lab: NORMAL
MCH RBC QN AUTO: 27.9 PG (ref 26.6–33)
MCHC RBC AUTO-ENTMCNC: 31.7 G/DL (ref 31.5–35.7)
MCV RBC AUTO: 88 FL (ref 79–97)
MONOCYTES # BLD AUTO: 0.3 X10E3/UL (ref 0.1–0.9)
MONOCYTES NFR BLD AUTO: 5 %
NEUTROPHILS # BLD AUTO: 3 X10E3/UL (ref 1.4–7)
NEUTROPHILS NFR BLD AUTO: 62 %
PLATELET # BLD AUTO: 249 X10E3/UL (ref 150–379)
POTASSIUM SERPL-SCNC: 4.2 MMOL/L (ref 3.5–5.2)
PROT SERPL-MCNC: 6.6 G/DL (ref 6–8.5)
RBC # BLD AUTO: 4.48 X10E6/UL (ref 3.77–5.28)
SODIUM SERPL-SCNC: 143 MMOL/L (ref 134–144)
T4 FREE SERPL-MCNC: 1.98 NG/DL (ref 0.82–1.77)
TRIGL SERPL-MCNC: 169 MG/DL (ref 0–149)
TSH SERPL DL<=0.005 MIU/L-ACNC: 0.1 UIU/ML (ref 0.45–4.5)
VLDLC SERPL CALC-MCNC: 34 MG/DL (ref 5–40)
WBC # BLD AUTO: 4.8 X10E3/UL (ref 3.4–10.8)

## 2018-10-04 ENCOUNTER — TELEPHONE (OUTPATIENT)
Dept: FAMILY MEDICINE CLINIC | Age: 63
End: 2018-10-04

## 2018-10-04 DIAGNOSIS — E03.9 ACQUIRED HYPOTHYROIDISM: Primary | Chronic | ICD-10-CM

## 2018-10-04 RX ORDER — LEVOTHYROXINE SODIUM 150 UG/1
150 TABLET ORAL
Qty: 90 TAB | Refills: 1 | Status: SHIPPED | OUTPATIENT
Start: 2018-10-04 | End: 2019-05-09 | Stop reason: DRUGHIGH

## 2018-10-04 NOTE — PROGRESS NOTES
Labs show thyroid level is too high. Need to lower dose of Levothyroxine. I will send in new prescription. Plan to recheck in 2-3 months. Blood sugar is elevated but acceptable A1C. Continue on Metformin. Improvement in cholesterol numbers.

## 2018-10-04 NOTE — TELEPHONE ENCOUNTER
Please advise pt her labs showed too high thyroid hormone. Will need to decrease dose of Levothyroxine. I will send new RX. Plan to repeat thyroid lab in 2-3  months.

## 2018-11-12 ENCOUNTER — OFFICE VISIT (OUTPATIENT)
Dept: FAMILY MEDICINE CLINIC | Age: 63
End: 2018-11-12

## 2018-11-12 VITALS
HEART RATE: 73 BPM | TEMPERATURE: 97.8 F | HEIGHT: 66 IN | OXYGEN SATURATION: 96 % | DIASTOLIC BLOOD PRESSURE: 64 MMHG | SYSTOLIC BLOOD PRESSURE: 120 MMHG | RESPIRATION RATE: 20 BRPM | BODY MASS INDEX: 40.95 KG/M2 | WEIGHT: 254.8 LBS

## 2018-11-12 DIAGNOSIS — J40 BRONCHITIS: Primary | ICD-10-CM

## 2018-11-12 DIAGNOSIS — Z91.09 POLLEN ALLERGIES: ICD-10-CM

## 2018-11-12 RX ORDER — AMOXICILLIN 875 MG/1
875 TABLET, FILM COATED ORAL 2 TIMES DAILY
Qty: 20 TAB | Refills: 0 | Status: SHIPPED | OUTPATIENT
Start: 2018-11-12 | End: 2018-11-22

## 2018-11-12 RX ORDER — CETIRIZINE HCL 10 MG
10 TABLET ORAL DAILY
Qty: 30 TAB | Refills: 5 | Status: SHIPPED | OUTPATIENT
Start: 2018-11-12 | End: 2020-01-08

## 2018-11-12 NOTE — PROGRESS NOTES
Identified pt with two pt identifiers(name and ). Chief Complaint   Patient presents with    Cold Symptoms     she has had over a week - she coughed all night - coughing up green mucenix    Back Pain        Health Maintenance Due   Topic    EYE EXAM RETINAL OR DILATED Q1     Shingrix Vaccine Age 50> (1 of 2)    BREAST CANCER SCRN MAMMOGRAM     PAP AKA CERVICAL CYTOLOGY     COLONOSCOPY        Wt Readings from Last 3 Encounters:   18 254 lb 12.8 oz (115.6 kg)   10/01/18 260 lb 12.8 oz (118.3 kg)   18 259 lb (117.5 kg)     Temp Readings from Last 3 Encounters:   18 97.8 °F (36.6 °C) (Oral)   10/01/18 97.9 °F (36.6 °C) (Oral)   18 98.2 °F (36.8 °C) (Oral)     BP Readings from Last 3 Encounters:   18 120/64   10/01/18 120/60   18 110/70     Pulse Readings from Last 3 Encounters:   18 73   10/01/18 71   18 78         Learning Assessment:  :     Learning Assessment 2014   PRIMARY LEARNER Patient   HIGHEST LEVEL OF EDUCATION - PRIMARY LEARNER  DID NOT GRADUATE HIGH SCHOOL   BARRIERS PRIMARY LEARNER NONE   CO-LEARNER CAREGIVER No   PRIMARY LANGUAGE ENGLISH   LEARNER PREFERENCE PRIMARY OTHER (COMMENT)   ANSWERED BY patient   RELATIONSHIP SELF       Depression Screening:  :     PHQ over the last two weeks 10/1/2018   PHQ Not Done -   Little interest or pleasure in doing things Not at all   Feeling down, depressed, irritable, or hopeless Several days   Total Score PHQ 2 1       Fall Risk Assessment:  :     Fall Risk Assessment, last 12 mths 10/11/2017   Able to walk? Yes   Fall in past 12 months? No       Abuse Screening:  :     Abuse Screening Questionnaire 10/1/2018 10/11/2017 10/10/2016 2015   Do you ever feel afraid of your partner? N N N N   Are you in a relationship with someone who physically or mentally threatens you? N N N N   Is it safe for you to go home?  Noah Ordoñez       Coordination of Care Questionnaire:  :     1) Have you been to an emergency room, urgent care clinic since your last visit? no   Hospitalized since your last visit? no             2) Have you seen or consulted any other health care providers outside of [address] [place] [address] since your last visit? no  (Include any pap smears or colon screenings in this section.)    3) Do you have an Advance Directive on file? no  Are you interested in receiving information about Advance Directives? no    Reviewed record in preparation for visit and have obtained necessary documentation. Medication reconciliation up to date and corrected with patient at this time.

## 2018-11-12 NOTE — PROGRESS NOTES
Subjective:   Mike Atwood is a 59 y.o. female who complains of congestion, sore throat and productive cough for 7 days, gradually worsening since that time. She denies a history of fevers and shortness of breath. Evaluation to date: none. Treatment to date: OTC products- Mucinex. Patient does not smoke cigarettes. Relevant PMH: No pertinent additional PMH. Patient Active Problem List    Diagnosis Date Noted    Obesity, morbid ([facility] [address] 06/28/2018    S/P TOMASA (total abdominal hysterectomy) 04/19/2016    Type II diabetes mellitus ([facility] [address] 04/19/2016    Essential hypertension 03/23/2016    Hypercholesterolemia 03/23/2016    ANNA on CPAP 07/01/2014    MOFFETT (nonalcoholic steatohepatitis) 06/28/2014    Lung nodule seen on imaging study 06/28/2014    Obesity 09/05/2011    Hypothyroidism 09/04/2011    Depression 09/04/2011    GERD 09/04/2011    Fibromyalgia 09/04/2011     Current Outpatient Medications   Medication Sig Dispense Refill    cetirizine (ZYRTEC) 10 mg tablet Take 1 Tab by mouth daily. 30 Tab 5    levothyroxine (SYNTHROID) 150 mcg tablet Take 1 Tab by mouth Daily (before breakfast). Indications: hypothyroidism 90 Tab 1    escitalopram oxalate (LEXAPRO) 20 mg tablet TAKE 2 TABLETS BY MOUTH DAILY 180 Tab 1    metFORMIN (GLUCOPHAGE) 500 mg tablet TAKE 1 TABLET BY MOUTH TWICE DAILY WITH MEALS 180 Tab 1    doxepin (SINEQUAN) 25 mg capsule TAKE 1 CAPSULE BY MOUTH AT BEDTIME 30 Cap 5    amLODIPine (NORVASC) 5 mg tablet TAKE 1 TABLET BY MOUTH DAILY FOR PRINZMETAL ANGINA 90 Tab 1    lamoTRIgine (LAMICTAL) 100 mg tablet Take 1 Tab by mouth daily. 90 Tab 1    pravastatin (PRAVACHOL) 40 mg tablet TAKE 1 TABLET BY MOUTH EVERY NIGHT 90 Tab 1    omeprazole (PRILOSEC) 20 mg capsule TAKE 1 CAPSULE BY MOUTH TWICE DAILY 60 Cap 5    meloxicam (MOBIC) 15 mg tablet Take 1 Tab by mouth daily.  30 Tab 3     Allergies   Allergen Reactions    No Allergy Information Available Other (comments) unresponsive    No Known Drug Allergies Unknown (comments)     NKDA per pt's         Review of Systems  Pertinent items are noted in HPI. Objective:     Visit Vitals  /64 (BP 1 Location: Left arm, BP Patient Position: Sitting) Comment: manual   Pulse 73   Temp 97.8 °F (36.6 °C) (Oral)   Resp 20   Ht 5' 6\" (1.676 m)   Wt 254 lb 12.8 oz (115.6 kg)   SpO2 96%   BMI 41.13 kg/m²     General:  alert, cooperative, no distress, moderately obese   Eyes: negative   Ears: abnormal TM AD - dull, abnormal TM AS - dull   Sinuses: Normal paranasal sinuses without tenderness   Mouth:  Lips, mucosa, and tongue normal. Teeth and gums normal   Neck: supple, symmetrical, trachea midline and no adenopathy. Heart: S1 and S2 normal, no murmurs noted. Lungs: clear to auscultation bilaterally   Abdomen:         Assessment/Plan:         ICD-10-CM ICD-9-CM    1. Bronchitis J40 490 amoxicillin (AMOXIL) 875 mg tablet   2. Pollen allergies J30.1 477.0 cetirizine (ZYRTEC) 10 mg tablet   .

## 2018-11-28 ENCOUNTER — OFFICE VISIT (OUTPATIENT)
Dept: FAMILY MEDICINE CLINIC | Age: 63
End: 2018-11-28

## 2018-11-28 VITALS
BODY MASS INDEX: 40.98 KG/M2 | WEIGHT: 255 LBS | RESPIRATION RATE: 20 BRPM | SYSTOLIC BLOOD PRESSURE: 122 MMHG | DIASTOLIC BLOOD PRESSURE: 80 MMHG | TEMPERATURE: 98.1 F | HEART RATE: 92 BPM | OXYGEN SATURATION: 98 % | HEIGHT: 66 IN

## 2018-11-28 DIAGNOSIS — J40 BRONCHITIS: Primary | ICD-10-CM

## 2018-11-28 RX ORDER — DOXYCYCLINE 100 MG/1
100 CAPSULE ORAL 2 TIMES DAILY
Qty: 20 CAP | Refills: 0 | Status: SHIPPED | OUTPATIENT
Start: 2018-11-28 | End: 2018-12-08

## 2018-11-28 NOTE — PATIENT INSTRUCTIONS
Bronchitis: Care Instructions  Your Care Instructions    Bronchitis is inflammation of the bronchial tubes, which carry air to the lungs. The tubes swell and produce mucus, or phlegm. The mucus and inflamed bronchial tubes make you cough. You may have trouble breathing. Most cases of bronchitis are caused by viruses like those that cause colds. Antibiotics usually do not help and they may be harmful. Bronchitis usually develops rapidly and lasts about 2 to 3 weeks in otherwise healthy people. Follow-up care is a key part of your treatment and safety. Be sure to make and go to all appointments, and call your doctor if you are having problems. It's also a good idea to know your test results and keep a list of the medicines you take. How can you care for yourself at home? · Take all medicines exactly as prescribed. Call your doctor if you think you are having a problem with your medicine. · Get some extra rest.  · Take an over-the-counter pain medicine, such as acetaminophen (Tylenol), ibuprofen (Advil, Motrin), or naproxen (Aleve) to reduce fever and relieve body aches. Read and follow all instructions on the label. · Do not take two or more pain medicines at the same time unless the doctor told you to. Many pain medicines have acetaminophen, which is Tylenol. Too much acetaminophen (Tylenol) can be harmful. · Take an over-the-counter cough medicine that contains dextromethorphan to help quiet a dry, hacking cough so that you can sleep. Avoid cough medicines that have more than one active ingredient. Read and follow all instructions on the label. · Breathe moist air from a humidifier, hot shower, or sink filled with hot water. The heat and moisture will thin mucus so you can cough it out. · Do not smoke. Smoking can make bronchitis worse. If you need help quitting, talk to your doctor about stop-smoking programs and medicines. These can increase your chances of quitting for good.   When should you call for help? Call 911 anytime you think you may need emergency care. For example, call if:    · You have severe trouble breathing.    Call your doctor now or seek immediate medical care if:    · You have new or worse trouble breathing.     · You cough up dark brown or bloody mucus (sputum).     · You have a new or higher fever.     · You have a new rash.    Watch closely for changes in your health, and be sure to contact your doctor if:    · You cough more deeply or more often, especially if you notice more mucus or a change in the color of your mucus.     · You are not getting better as expected. Where can you learn more? Go to http://kelli-carl.info/. Enter H333 in the search box to learn more about \"Bronchitis: Care Instructions. \"  Current as of: December 6, 2017  Content Version: 11.8  © 2433-8835 [employer]. Care instructions adapted under license by [employer] (which disclaims liability or warranty for this information). If you have questions about a medical condition or this instruction, always ask your healthcare professional. [address] any warranty or liability for your use of this information.

## 2018-11-28 NOTE — PROGRESS NOTES
Subjective:      Kylie Roach is a 59 y.o. female here with complaint of productive cough, mild dyspnea during coughing fits and with exertion, and overall not feeling well for approximately 2 weeks. Has now started with postnasal drainage, sinus congestion and nasal congestion which started today. Associated with nausea without emesis. Positive sick contacts - her grandson. Appetite is decreased, drinking fluids well. Evaluation to date: 11/12/18 diagnosed with bronchitis and has completed 10-day course of amoxicillin. Treatment to date: Mucinex       Current Outpatient Medications   Medication Sig Dispense Refill    cetirizine (ZYRTEC) 10 mg tablet Take 1 Tab by mouth daily. 30 Tab 5    levothyroxine (SYNTHROID) 150 mcg tablet Take 1 Tab by mouth Daily (before breakfast). Indications: hypothyroidism 90 Tab 1    escitalopram oxalate (LEXAPRO) 20 mg tablet TAKE 2 TABLETS BY MOUTH DAILY 180 Tab 1    metFORMIN (GLUCOPHAGE) 500 mg tablet TAKE 1 TABLET BY MOUTH TWICE DAILY WITH MEALS 180 Tab 1    doxepin (SINEQUAN) 25 mg capsule TAKE 1 CAPSULE BY MOUTH AT BEDTIME 30 Cap 5    amLODIPine (NORVASC) 5 mg tablet TAKE 1 TABLET BY MOUTH DAILY FOR PRINZMETAL ANGINA 90 Tab 1    lamoTRIgine (LAMICTAL) 100 mg tablet Take 1 Tab by mouth daily.  90 Tab 1    pravastatin (PRAVACHOL) 40 mg tablet TAKE 1 TABLET BY MOUTH EVERY NIGHT 90 Tab 1    omeprazole (PRILOSEC) 20 mg capsule TAKE 1 CAPSULE BY MOUTH TWICE DAILY 60 Cap 5       Allergies   Allergen Reactions    No Allergy Information Available Other (comments)     unresponsive    No Known Drug Allergies Unknown (comments)     NKDA per pt's        Past Medical History:   Diagnosis Date    Depression     Diabetes ([facility] [address]     Gastrointestinal disorder     gerd    High cholesterol     Obese     Other ill-defined conditions(799.89)     fibromyalgia    Psychiatric disorder     depression       Social History     Tobacco Use    Smoking status: Former Smoker  Smokeless tobacco: Never Used   Substance Use Topics    Alcohol use: No     Alcohol/week: 0.0 oz     Comment: rarely         Review of Systems  Pertinent items are noted in HPI. Objective:     Visit Vitals  /80 (BP 1 Location: Right arm, BP Patient Position: Sitting) Comment: Manual   Pulse 92   Temp 98.1 °F (36.7 °C) (Oral) Comment: .   Resp 20   Ht 5' 6\" (1.676 m)   Wt 255 lb (115.7 kg)   SpO2 98%   BMI 41.16 kg/m²      General appearance - alert, well appearing, and in no distress  Eyes - pupils equal and reactive, extraocular eye movements intact, sclera anicteric  Ears - right TM dull, left TM normal   Nose - normal and patent, no erythema, discharge or polyps and sinus tenderness noted bilateral maxillary sinuses   Oropharyngx - mucous membranes moist, pharynx normal without lesions  Neck - bilateral symmetric anterior adenopathy  Chest - rhonchi of right upper and lower lung, no rales or wheezes, good air movement, respirations unlabored, harsh junky cough present   Heart - normal rate, regular rhythm, normal S1, S2, no murmurs, rubs, clicks or gallops    Assessment/Plan:   Graeme Olszewski is a 59 y.o. female seen for:     1. Bronchitis  - doxycycline (VIBRAMYCIN) 100 mg capsule; Take 1 Cap by mouth two (2) times a day for 10 days. Dispense: 20 Cap; Refill: 0  - Symptomatic therapy suggested: push fluids, rest and use cough suppressant of choice prn  - Consider CXR if symptoms worsen or fail to improve    I have discussed the diagnosis with the patient and the intended plan as seen in the above orders. The patient has received an after-visit summary and questions were answered concerning future plans. I have discussed medication side effects and warnings with the patient as well. Patient verbalizes understanding of plan of care and denies further questions or concerns at this time. Informed patient to return to the office if symptoms worsen or if new symptoms arise.       Follow-up Disposition:  Return if symptoms worsen or fail to improve.

## 2018-11-28 NOTE — PROGRESS NOTES
Identified pt with two pt identifiers(name and ). Chief Complaint   Patient presents with    Cough     finished Amoxicillin but is still not feeling any better.  Sinus Pain    Nasal Congestion        Health Maintenance Due   Topic    EYE EXAM RETINAL OR DILATED Q1     Shingrix Vaccine Age 50> (1 of 2)    BREAST CANCER SCRN MAMMOGRAM     PAP AKA CERVICAL CYTOLOGY     COLONOSCOPY        Wt Readings from Last 3 Encounters:   18 255 lb (115.7 kg)   18 254 lb 12.8 oz (115.6 kg)   10/01/18 260 lb 12.8 oz (118.3 kg)     Temp Readings from Last 3 Encounters:   18 98.1 °F (36.7 °C) (Oral)   18 97.8 °F (36.6 °C) (Oral)   10/01/18 97.9 °F (36.6 °C) (Oral)     BP Readings from Last 3 Encounters:   18 122/80   18 120/64   10/01/18 120/60     Pulse Readings from Last 3 Encounters:   18 92   18 73   10/01/18 71         Learning Assessment:  :     Learning Assessment 2014   PRIMARY LEARNER Patient   HIGHEST LEVEL OF EDUCATION - PRIMARY LEARNER  DID NOT GRADUATE HIGH SCHOOL   BARRIERS PRIMARY LEARNER NONE   CO-LEARNER CAREGIVER No   PRIMARY LANGUAGE ENGLISH   LEARNER PREFERENCE PRIMARY OTHER (COMMENT)   ANSWERED BY patient   RELATIONSHIP SELF       Depression Screening:  :     PHQ over the last two weeks 10/1/2018   PHQ Not Done -   Little interest or pleasure in doing things Not at all   Feeling down, depressed, irritable, or hopeless Several days   Total Score PHQ 2 1       Fall Risk Assessment:  :     Fall Risk Assessment, last 12 mths 10/11/2017   Able to walk? Yes   Fall in past 12 months? No       Abuse Screening:  :     Abuse Screening Questionnaire 10/1/2018 10/11/2017 10/10/2016 2015   Do you ever feel afraid of your partner? N N N N   Are you in a relationship with someone who physically or mentally threatens you? N N N N   Is it safe for you to go home?  Rina Couch       Coordination of Care Questionnaire:  :     1) Have you been to an emergency room, urgent care clinic since your last visit? no   Hospitalized since your last visit? no             2) Have you seen or consulted any other health care providers outside of [address] [place] [address] since your last visit? no  (Include any pap smears or colon screenings in this section.)    3) Do you have an Advance Directive on file? no  Are you interested in receiving information about Advance Directives? no    Reviewed record in preparation for visit and have obtained necessary documentation. Medication reconciliation up to date and corrected with patient at this time.

## 2018-12-03 ENCOUNTER — OFFICE VISIT (OUTPATIENT)
Dept: FAMILY MEDICINE CLINIC | Age: 63
End: 2018-12-03

## 2018-12-03 VITALS
WEIGHT: 259 LBS | BODY MASS INDEX: 41.62 KG/M2 | RESPIRATION RATE: 20 BRPM | HEART RATE: 72 BPM | OXYGEN SATURATION: 98 % | HEIGHT: 66 IN | TEMPERATURE: 98.5 F | DIASTOLIC BLOOD PRESSURE: 72 MMHG | SYSTOLIC BLOOD PRESSURE: 134 MMHG

## 2018-12-03 DIAGNOSIS — J20.9 ACUTE BRONCHITIS, UNSPECIFIED ORGANISM: Primary | ICD-10-CM

## 2018-12-03 RX ORDER — BENZONATATE 100 MG/1
100 CAPSULE ORAL
Qty: 21 CAP | Refills: 0 | Status: SHIPPED | OUTPATIENT
Start: 2018-12-03 | End: 2018-12-10

## 2018-12-03 NOTE — PROGRESS NOTES
Identified pt with two pt identifiers(name and ). Chief Complaint   Patient presents with    Cough     Patient states the cough has not gotten better.  Vomiting     this am. only time. Health Maintenance Due   Topic    EYE EXAM RETINAL OR DILATED Q1     Shingrix Vaccine Age 50> (1 of 2)    BREAST CANCER SCRN MAMMOGRAM     PAP AKA CERVICAL CYTOLOGY     COLONOSCOPY        Wt Readings from Last 3 Encounters:   18 259 lb (117.5 kg)   18 255 lb (115.7 kg)   18 254 lb 12.8 oz (115.6 kg)     Temp Readings from Last 3 Encounters:   18 98.5 °F (36.9 °C) (Oral)   18 98.1 °F (36.7 °C) (Oral)   18 97.8 °F (36.6 °C) (Oral)     BP Readings from Last 3 Encounters:   18 134/72   18 122/80   18 120/64     Pulse Readings from Last 3 Encounters:   18 72   18 92   18 73         Learning Assessment:  :     Learning Assessment 2014   PRIMARY LEARNER Patient   HIGHEST LEVEL OF EDUCATION - PRIMARY LEARNER  DID NOT GRADUATE HIGH SCHOOL   BARRIERS PRIMARY LEARNER NONE   CO-LEARNER CAREGIVER No   PRIMARY LANGUAGE ENGLISH   LEARNER PREFERENCE PRIMARY OTHER (COMMENT)   ANSWERED BY patient   RELATIONSHIP SELF       Depression Screening:  :     PHQ over the last two weeks 10/1/2018   PHQ Not Done -   Little interest or pleasure in doing things Not at all   Feeling down, depressed, irritable, or hopeless Several days   Total Score PHQ 2 1       Fall Risk Assessment:  :     Fall Risk Assessment, last 12 mths 10/11/2017   Able to walk? Yes   Fall in past 12 months? No       Abuse Screening:  :     Abuse Screening Questionnaire 10/1/2018 10/11/2017 10/10/2016 2015   Do you ever feel afraid of your partner? N N N N   Are you in a relationship with someone who physically or mentally threatens you? N N N N   Is it safe for you to go home?  Kiera Mayer       Coordination of Care Questionnaire:  :     1) Have you been to an emergency room, urgent care clinic since your last visit? no   Hospitalized since your last visit? no             2) Have you seen or consulted any other health care providers outside of [address] [place] [address] since your last visit? no  (Include any pap smears or colon screenings in this section.)    3) Do you have an Advance Directive on file? no  Are you interested in receiving information about Advance Directives? no    Reviewed record in preparation for visit and have obtained necessary documentation. Medication reconciliation up to date and corrected with patient at this time.

## 2018-12-03 NOTE — PROGRESS NOTES
Subjective:      Viktoria Swanson is a 59 y.o. female here with complaint of cough which has not improved. Did have episode of emesis earlier today. Denies dyspnea. Evaluation to date: 11/12/18 diagnosed with bronchitis and has completed 10-day course of amoxicillin, currently on doxycycline   Treatment to date: OTC cold medication, Mucinex     Current Outpatient Medications   Medication Sig Dispense Refill    doxycycline (VIBRAMYCIN) 100 mg capsule Take 1 Cap by mouth two (2) times a day for 10 days. 20 Cap 0    cetirizine (ZYRTEC) 10 mg tablet Take 1 Tab by mouth daily. 30 Tab 5    levothyroxine (SYNTHROID) 150 mcg tablet Take 1 Tab by mouth Daily (before breakfast). Indications: hypothyroidism 90 Tab 1    escitalopram oxalate (LEXAPRO) 20 mg tablet TAKE 2 TABLETS BY MOUTH DAILY 180 Tab 1    metFORMIN (GLUCOPHAGE) 500 mg tablet TAKE 1 TABLET BY MOUTH TWICE DAILY WITH MEALS 180 Tab 1    doxepin (SINEQUAN) 25 mg capsule TAKE 1 CAPSULE BY MOUTH AT BEDTIME 30 Cap 5    amLODIPine (NORVASC) 5 mg tablet TAKE 1 TABLET BY MOUTH DAILY FOR PRINZMETAL ANGINA 90 Tab 1    lamoTRIgine (LAMICTAL) 100 mg tablet Take 1 Tab by mouth daily.  90 Tab 1    pravastatin (PRAVACHOL) 40 mg tablet TAKE 1 TABLET BY MOUTH EVERY NIGHT 90 Tab 1    omeprazole (PRILOSEC) 20 mg capsule TAKE 1 CAPSULE BY MOUTH TWICE DAILY 60 Cap 5       Allergies   Allergen Reactions    No Allergy Information Available Other (comments)     unresponsive    No Known Drug Allergies Unknown (comments)     NKDA per pt's        Past Medical History:   Diagnosis Date    Depression     Diabetes ([facility] [address]     Gastrointestinal disorder     gerd    High cholesterol     Obese     Other ill-defined conditions(799.89)     fibromyalgia    Psychiatric disorder     depression       Social History     Tobacco Use    Smoking status: Former Smoker    Smokeless tobacco: Never Used   Substance Use Topics    Alcohol use: No     Alcohol/week: 0.0 oz Comment: rarely         Review of Systems  Pertinent items are noted in HPI. Objective:     Visit Vitals  /72 (BP 1 Location: Right arm, BP Patient Position: Sitting) Comment: . Pulse 72   Temp 98.5 °F (36.9 °C) (Oral) Comment: .   Resp 20   Ht 5' 6\" (1.676 m)   Wt 259 lb (117.5 kg)   SpO2 98%   BMI 41.80 kg/m²      General appearance - alert, well appearing, and in no distress  Eyes - pupils equal and reactive, extraocular eye movements intact, sclera anicteric  Oropharyngx - mucous membranes moist, pharynx normal without lesions  Neck - supple, no significant adenopathy  Chest - clear to auscultation, no wheezes, rales or rhonchi, symmetric air entry, no tachypnea, retractions or cyanosis, intermittent harsh cough noted   Heart - normal rate, regular rhythm, normal S1, S2, no murmurs, rubs, clicks or gallops      Assessment/Plan:   Allyson Woodall is a 59 y.o. female seen for:     1. Acute bronchitis, unspecified organism  - benzonatate (TESSALON PERLES) 100 mg capsule; Take 1 Cap by mouth three (3) times daily as needed for Cough for up to 7 days. Dispense: 21 Cap; Refill: 0  - continue doxycycline       I have discussed the diagnosis with the patient and the intended plan as seen in the above orders. The patient has received an after-visit summary and questions were answered concerning future plans. I have discussed medication side effects and warnings with the patient as well. Patient verbalizes understanding of plan of care and denies further questions or concerns at this time. Informed patient to return to the office if symptoms worsen or if new symptoms arise. Follow-up Disposition:  Return if symptoms worsen or fail to improve.

## 2018-12-03 NOTE — PATIENT INSTRUCTIONS
Cough: Care Instructions  Your Care Instructions    A cough is your body's response to something that bothers your throat or airways. Many things can cause a cough. You might cough because of a cold or the flu, bronchitis, or asthma. Smoking, postnasal drip, allergies, and stomach acid that backs up into your throat also can cause coughs. A cough is a symptom, not a disease. Most coughs stop when the cause, such as a cold, goes away. You can take a few steps at home to cough less and feel better. Follow-up care is a key part of your treatment and safety. Be sure to make and go to all appointments, and call your doctor if you are having problems. It's also a good idea to know your test results and keep a list of the medicines you take. How can you care for yourself at home? · Drink lots of water and other fluids. This helps thin the mucus and soothes a dry or sore throat. Honey or lemon juice in hot water or tea may ease a dry cough. · Take cough medicine as directed by your doctor. · Prop up your head on pillows to help you breathe and ease a dry cough. · Try cough drops to soothe a dry or sore throat. Cough drops don't stop a cough. Medicine-flavored cough drops are no better than candy-flavored drops or hard candy. · Do not smoke. Avoid secondhand smoke. If you need help quitting, talk to your doctor about stop-smoking programs and medicines. These can increase your chances of quitting for good. When should you call for help? Call 911 anytime you think you may need emergency care.  For example, call if:    · You have severe trouble breathing.    Call your doctor now or seek immediate medical care if:    · You cough up blood.     · You have new or worse trouble breathing.     · You have a new or higher fever.     · You have a new rash.    Watch closely for changes in your health, and be sure to contact your doctor if:    · You cough more deeply or more often, especially if you notice more mucus or a change in the color of your mucus.     · You have new symptoms, such as a sore throat, an earache, or sinus pain.     · You do not get better as expected. Where can you learn more? Go to http://kelli-carl.info/. Enter D279 in the search box to learn more about \"Cough: Care Instructions. \"  Current as of: December 6, 2017  Content Version: 11.8  © 0629-3358 [employer]. Care instructions adapted under license by [employer] (which disclaims liability or warranty for this information). If you have questions about a medical condition or this instruction, always ask your healthcare professional. [address] any warranty or liability for your use of this information.

## 2019-01-18 ENCOUNTER — HOSPITAL ENCOUNTER (EMERGENCY)
Age: 64
Discharge: HOME | End: 2019-01-18
Attending: EMERGENCY MEDICINE
Payer: COMMERCIAL

## 2019-01-18 ENCOUNTER — TELEPHONE (OUTPATIENT)
Dept: FAMILY MEDICINE CLINIC | Age: 64
End: 2019-01-18

## 2019-01-18 VITALS
TEMPERATURE: 97.8 F | RESPIRATION RATE: 14 BRPM | SYSTOLIC BLOOD PRESSURE: 144 MMHG | WEIGHT: 264.99 LBS | HEART RATE: 76 BPM | DIASTOLIC BLOOD PRESSURE: 68 MMHG | BODY MASS INDEX: 42.59 KG/M2 | HEIGHT: 66 IN | OXYGEN SATURATION: 94 %

## 2019-01-18 DIAGNOSIS — L03.90 CELLULITIS, UNSPECIFIED CELLULITIS SITE: Primary | ICD-10-CM

## 2019-01-18 PROCEDURE — 90715 TDAP VACCINE 7 YRS/> IM: CPT | Performed by: EMERGENCY MEDICINE

## 2019-01-18 PROCEDURE — 99282 EMERGENCY DEPT VISIT SF MDM: CPT

## 2019-01-18 PROCEDURE — 90471 IMMUNIZATION ADMIN: CPT

## 2019-01-18 PROCEDURE — 74011250636 HC RX REV CODE- 250/636: Performed by: EMERGENCY MEDICINE

## 2019-01-18 RX ORDER — CEPHALEXIN 500 MG/1
500 CAPSULE ORAL 4 TIMES DAILY
Qty: 28 CAP | Refills: 0 | Status: SHIPPED | OUTPATIENT
Start: 2019-01-18 | End: 2019-01-25

## 2019-01-18 RX ADMIN — TETANUS TOXOID, REDUCED DIPHTHERIA TOXOID AND ACELLULAR PERTUSSIS VACCINE, ADSORBED 0.5 ML: 5; 2.5; 8; 8; 2.5 SUSPENSION INTRAMUSCULAR at 11:46

## 2019-01-18 NOTE — TELEPHONE ENCOUNTER
Patient called to report left leg pain s/p fall 1 week ago. Walking around her bedroom and tripped over any object scraping the anterior surface of her left leg. She has been keeping area clean and applying Neosporin daily. Left leg continues to be very painful and she has noticed swelling from her mid-shin down into her left ankle. Leg is warm to the touch and there is redness where she scraped the leg. Discussed that concern exists for infection and that she should be evaluated today instead of waiting until next week (currently with no available appointments today). Patient verbalizes understanding.

## 2019-01-18 NOTE — ED NOTES
Patient discharged home after receiving discharge instructions from MD.  Patient and  voiced understanding and doesn't have any questions at this time. Patient in no distress at this time.  Ambulated out of the ER

## 2019-01-18 NOTE — ED PROVIDER NOTES
HPI The patient has a laceration to the left shin, and that is approximately one week old and has now become more red and painful and there is some surrounding erythema suggestive of cellulitis. The laceration is approximately 5 cm. She hit it on a wooden post. She denies fever or systemic symptoms. Past Medical History:  
Diagnosis Date  Depression  Diabetes ([facility] [address]  Gastrointestinal disorder   
 gerd  High cholesterol  Obese  Other ill-defined conditions(799.89)   
 fibromyalgia  Psychiatric disorder   
 depression Past Surgical History:  
Procedure Laterality Date  DELIVERY     
 X 2  
 HX GYN    
 hysterectomy  HX SEPTOPLASTY Family History:  
Problem Relation Age of Onset  Hypertension Mother  Heart Disease Father  Cancer Brother Social History Socioeconomic History  Marital status:  Spouse name: Not on file  Number of children: Not on file  Years of education: Not on file  Highest education level: Not on file Social Needs  Financial resource strain: Not on file  Food insecurity - worry: Not on file  Food insecurity - inability: Not on file  Transportation needs - medical: Not on file  Transportation needs - non-medical: Not on file Occupational History  Not on file Tobacco Use  Smoking status: Former Smoker  Smokeless tobacco: Never Used Substance and Sexual Activity  Alcohol use: No  
  Alcohol/week: 0.0 oz  
  Comment: rarely  Drug use: No  
 Sexual activity: Not Currently Birth control/protection: None Other Topics Concern  Not on file Social History Narrative  Not on file ALLERGIES: No allergy information available and No known drug allergies Review of Systems Skin: Positive for wound. There were no vitals filed for this visit. Physical Exam  
Constitutional: She appears well-developed and well-nourished. No distress. HENT:  
Head: Normocephalic. Neck: Normal range of motion. Cardiovascular: Normal rate. Pulmonary/Chest: Effort normal.  
Musculoskeletal: Normal range of motion. Neurological: She is alert. Skin: Skin is warm and dry. Capillary refill takes less than 2 seconds. See hpi Psychiatric: She has a normal mood and affect. Her behavior is normal.  
  
 
MDM Procedures

## 2019-01-18 NOTE — ED TRIAGE NOTES
Cut left shin about a week ago while walking in the dark, fell and scraped leg on old wooden picnic basket. Tetanus 9/1/2013. Pt ambulated to room. Dr. Susanna Duran at bedside. Pt didn't hit head and no LOC. No fever

## 2019-01-30 DIAGNOSIS — G89.29 RIGHT FLANK PAIN, CHRONIC: ICD-10-CM

## 2019-01-30 DIAGNOSIS — R10.9 RIGHT FLANK PAIN, CHRONIC: ICD-10-CM

## 2019-01-30 RX ORDER — MELOXICAM 15 MG/1
TABLET ORAL
Qty: 30 TAB | Refills: 3 | Status: SHIPPED | OUTPATIENT
Start: 2019-01-30 | End: 2019-04-15

## 2019-03-07 RX ORDER — LAMOTRIGINE 100 MG/1
TABLET ORAL
Qty: 90 TAB | Refills: 1 | Status: SHIPPED | OUTPATIENT
Start: 2019-03-07 | End: 2020-01-08 | Stop reason: DRUGHIGH

## 2019-03-07 RX ORDER — METFORMIN HYDROCHLORIDE 500 MG/1
TABLET ORAL
Qty: 180 TAB | Refills: 1 | Status: SHIPPED | OUTPATIENT
Start: 2019-03-07 | End: 2020-01-08 | Stop reason: SDUPTHER

## 2019-03-07 RX ORDER — AMLODIPINE BESYLATE 5 MG/1
TABLET ORAL
Qty: 90 TAB | Refills: 1 | Status: SHIPPED | OUTPATIENT
Start: 2019-03-07 | End: 2019-11-10 | Stop reason: SDUPTHER

## 2019-03-07 RX ORDER — DOXEPIN HYDROCHLORIDE 25 MG/1
CAPSULE ORAL
Qty: 90 CAP | Refills: 1 | Status: SHIPPED | OUTPATIENT
Start: 2019-03-07 | End: 2019-09-06 | Stop reason: SDUPTHER

## 2019-04-15 ENCOUNTER — OFFICE VISIT (OUTPATIENT)
Dept: FAMILY MEDICINE CLINIC | Age: 64
End: 2019-04-15

## 2019-04-15 VITALS
TEMPERATURE: 98.9 F | HEIGHT: 66 IN | RESPIRATION RATE: 18 BRPM | SYSTOLIC BLOOD PRESSURE: 118 MMHG | WEIGHT: 251 LBS | OXYGEN SATURATION: 98 % | DIASTOLIC BLOOD PRESSURE: 64 MMHG | BODY MASS INDEX: 40.34 KG/M2 | HEART RATE: 77 BPM

## 2019-04-15 DIAGNOSIS — Z00.00 ROUTINE PHYSICAL EXAMINATION: ICD-10-CM

## 2019-04-15 DIAGNOSIS — E03.9 ACQUIRED HYPOTHYROIDISM: Chronic | ICD-10-CM

## 2019-04-15 DIAGNOSIS — I10 ESSENTIAL HYPERTENSION: ICD-10-CM

## 2019-04-15 DIAGNOSIS — E78.00 HYPERCHOLESTEROLEMIA: ICD-10-CM

## 2019-04-15 DIAGNOSIS — E66.01 OBESITY, MORBID (HCC): ICD-10-CM

## 2019-04-15 DIAGNOSIS — Z23 ENCOUNTER FOR IMMUNIZATION: ICD-10-CM

## 2019-04-15 DIAGNOSIS — Z12.39 SCREENING FOR MALIGNANT NEOPLASM OF BREAST: ICD-10-CM

## 2019-04-15 DIAGNOSIS — E11.9 TYPE 2 DIABETES MELLITUS WITHOUT COMPLICATION, WITHOUT LONG-TERM CURRENT USE OF INSULIN (HCC): ICD-10-CM

## 2019-04-15 DIAGNOSIS — R53.83 FATIGUE, UNSPECIFIED TYPE: Primary | ICD-10-CM

## 2019-04-15 RX ORDER — PRAVASTATIN SODIUM 40 MG/1
TABLET ORAL
Qty: 90 TAB | Refills: 1 | Status: SHIPPED | OUTPATIENT
Start: 2019-04-15 | End: 2020-01-08 | Stop reason: SDUPTHER

## 2019-04-15 NOTE — PROGRESS NOTES
Identified pt with two pt identifiers(name and ). Chief Complaint Patient presents with  Fatigue Patient would like a physical to see why she is so exausted all the time. Symptoms began a while ago but has gotten worse in the past month.  Hand Pain  Generalized Body Aches  Labs  
  patient is fasting Health Maintenance Due Topic  Pneumococcal 0-64 years (1 of 1 - PPSV23)  EYE EXAM RETINAL OR DILATED  Shingrix Vaccine Age 50> (1 of 2)  BREAST CANCER SCRN MAMMOGRAM   
 PAP AKA CERVICAL CYTOLOGY  HEMOGLOBIN A1C Q6M Wt Readings from Last 3 Encounters:  
04/15/19 251 lb (113.9 kg) 19 264 lb 15.9 oz (120.2 kg) 18 259 lb (117.5 kg) Temp Readings from Last 3 Encounters:  
04/15/19 98.9 °F (37.2 °C) (Oral) 19 97.8 °F (36.6 °C)  
18 98.5 °F (36.9 °C) (Oral) BP Readings from Last 3 Encounters:  
04/15/19 118/64  
19 144/68  
18 134/72 Pulse Readings from Last 3 Encounters:  
04/15/19 77  
19 76  
18 72 Learning Assessment: 
:  
 
Learning Assessment 2014 PRIMARY LEARNER Patient HIGHEST LEVEL OF EDUCATION - PRIMARY LEARNER  DID NOT GRADUATE HIGH SCHOOL  
BARRIERS PRIMARY LEARNER NONE  
CO-LEARNER CAREGIVER No  
PRIMARY LANGUAGE ENGLISH  
LEARNER PREFERENCE PRIMARY OTHER (COMMENT) ANSWERED BY patient RELATIONSHIP SELF Depression Screening: 
:  
 
3 most recent PHQ Screens 10/1/2018 PHQ Not Done - Little interest or pleasure in doing things Not at all Feeling down, depressed, irritable, or hopeless Several days Total Score PHQ 2 1 Fall Risk Assessment: 
:  
 
Fall Risk Assessment, last 12 mths 10/11/2017 Able to walk? Yes Fall in past 12 months? No  
 
 
Abuse Screening: 
:  
 
Abuse Screening Questionnaire 10/1/2018 10/11/2017 10/10/2016 2015 Do you ever feel afraid of your partner? N N N N Are you in a relationship with someone who physically or mentally threatens you? N N N N Is it safe for you to go home? Lj Rhoades Coordination of Care Questionnaire: 
:  
 
1) Have you been to an emergency room, urgent care clinic since your last visit? no  
Hospitalized since your last visit? no          
 
2) Have you seen or consulted any other health care providers outside of [address] [place] [address] since your last visit? no  (Include any pap smears or colon screenings in this section.) 3) Do you have an Advance Directive on file? no 
Are you interested in receiving information about Advance Directives? no 
 
Reviewed record in preparation for visit and have obtained necessary documentation. Medication reconciliation up to date and corrected with patient at this time.

## 2019-04-15 NOTE — PATIENT INSTRUCTIONS
Fatigue: Care Instructions Your Care Instructions Fatigue is a feeling of tiredness, exhaustion, or lack of energy. You may feel fatigue because of too much or not enough activity. It can also come from stress, lack of sleep, boredom, and poor diet. Many medical problems, such as viral infections, can cause fatigue. Emotional problems, especially depression, are often the cause of fatigue. Fatigue is most often a symptom of another problem. Treatment for fatigue depends on the cause. For example, if you have fatigue because you have a certain health problem, treating this problem also treats your fatigue. If depression or anxiety is the cause, treatment may help. Follow-up care is a key part of your treatment and safety. Be sure to make and go to all appointments, and call your doctor if you are having problems. It's also a good idea to know your test results and keep a list of the medicines you take. How can you care for yourself at home? · Get regular exercise. But don't overdo it. Go back and forth between rest and exercise. · Get plenty of rest. 
· Eat a healthy diet. Do not skip meals, especially breakfast. 
· Reduce your use of caffeine, tobacco, and alcohol. Caffeine is most often found in coffee, tea, cola drinks, and chocolate. · Limit medicines that can cause fatigue. This includes tranquilizers and cold and allergy medicines. When should you call for help? Watch closely for changes in your health, and be sure to contact your doctor if: 
  · You have new symptoms such as fever or a rash.  
  · Your fatigue gets worse.  
  · You have been feeling down, depressed, or hopeless. Or you may have lost interest in things that you usually enjoy.  
  · You are not getting better as expected. Where can you learn more? Go to http://kelli-carl.info/. Enter D854 in the search box to learn more about \"Fatigue: Care Instructions. \" Current as of: September 23, 2018 Content Version: 11.9 © 4905-3497 [employer]. Care instructions adapted under license by [employer] (which disclaims liability or warranty for this information). If you have questions about a medical condition or this instruction, always ask your healthcare professional. [address] any warranty or liability for your use of this information. A Healthy Lifestyle: Care Instructions Your Care Instructions A healthy lifestyle can help you feel good, stay at a healthy weight, and have plenty of energy for both work and play. A healthy lifestyle is something you can share with your whole family. A healthy lifestyle also can lower your risk for serious health problems, such as high blood pressure, heart disease, and diabetes. You can follow a few steps listed below to improve your health and the health of your family. Follow-up care is a key part of your treatment and safety. Be sure to make and go to all appointments, and call your doctor if you are having problems. It's also a good idea to know your test results and keep a list of the medicines you take. How can you care for yourself at home? · Do not eat too much sugar, fat, or fast foods. You can still have dessert and treats now and then. The goal is moderation. · Start small to improve your eating habits. Pay attention to portion sizes, drink less juice and soda pop, and eat more fruits and vegetables. ? Eat a healthy amount of food. A 3-ounce serving of meat, for example, is about the size of a deck of cards. Fill the rest of your plate with vegetables and whole grains. ? Limit the amount of soda and sports drinks you have every day. Drink more water when you are thirsty. ? Eat at least 5 servings of fruits and vegetables every day. It may seem like a lot, but it is not hard to reach this goal. A serving or helping is 1 piece of fruit, 1 cup of vegetables, or 2 cups of leafy, raw vegetables. Have an apple or some carrot sticks as an afternoon snack instead of a candy bar. Try to have fruits and/or vegetables at every meal. 
· Make exercise part of your daily routine. You may want to start with simple activities, such as walking, bicycling, or slow swimming. Try to be active 30 to 60 minutes every day. You do not need to do all 30 to 60 minutes all at once. For example, you can exercise 3 times a day for 10 or 20 minutes. Moderate exercise is safe for most people, but it is always a good idea to talk to your doctor before starting an exercise program. 
· Keep moving. Jordon Paulson the lawn, work in the garden, or [employer]. Take the stairs instead of the elevator at work. · If you smoke, quit. People who smoke have an increased risk for heart attack, stroke, cancer, and other lung illnesses. Quitting is hard, but there are ways to boost your chance of quitting tobacco for good. ? Use nicotine gum, patches, or lozenges. ? Ask your doctor about stop-smoking programs and medicines. ? Keep trying. In addition to reducing your risk of diseases in the future, you will notice some benefits soon after you stop using tobacco. If you have shortness of breath or asthma symptoms, they will likely get better within a few weeks after you quit. · Limit how much alcohol you drink. Moderate amounts of alcohol (up to 2 drinks a day for men, 1 drink a day for women) are okay. But drinking too much can lead to liver problems, high blood pressure, and other health problems. Family health If you have a family, there are many things you can do together to improve your health. · Eat meals together as a family as often as possible. · Eat healthy foods. This includes fruits, vegetables, lean meats and dairy, and whole grains. · Include your family in your fitness plan.  Most people think of activities such as jogging or tennis as the way to fitness, but there are many ways you and your family can be more active. Anything that makes you breathe hard and gets your heart pumping is exercise. Here are some tips: 
? Walk to do errands or to take your child to school or the bus. 
? Go for a family bike ride after dinner instead of watching TV. Where can you learn more? Go to http://kelli-carl.info/. Enter F121 in the search box to learn more about \"A Healthy Lifestyle: Care Instructions. \" Current as of: September 11, 2018 Content Version: 11.9 © 1080-3887 [employer]. Care instructions adapted under license by [employer] (which disclaims liability or warranty for this information). If you have questions about a medical condition or this instruction, always ask your healthcare professional. [address] any warranty or liability for your use of this information.

## 2019-04-15 NOTE — PROGRESS NOTES
Subjective:  
  
Faye Lisa is a 59 y.o. female here for annual physical exam. She reports that she is \"tired all the time\". Endorses sad mood in that she is unable to complete her daily tasks due to fatigue. She has found that she forgetting as well. She is fasting for labs. Health Maintenance: · Cervical cancer screenin/19/15, cytology and HPV negative. · Mammogram: 12/30/15, BIRADS-1 negative. · Colonoscopy: 19, repeat in 5 years · Hepatitis C screening (born between [birth year] and 1964): negative 11/19/15 · Tetanus: 19 Diet and Lifestyle: generally follows a low fat low cholesterol diet, generally follows a low sodium diet, exercises sporadically, nonsmoker Home BP Monitoring: is not measured at home Diabetes mellitus: · Medication compliance: compliant all of the time · Diabetic diet compliance: compliant most of the time · Home glucose monitoring: is performed sporadically · Further diabetic ROS: no polyuria or polydipsia, no chest pain or dyspnea, no numbness, tingling or pain in extremities, no unusual visual symptoms, no hypoglycemia. Current Outpatient Medications Medication Sig Dispense Refill  doxepin (SINEQUAN) 25 mg capsule TAKE 1 CAPSULE BY MOUTH AT BEDTIME 90 Cap 1  
 amLODIPine (NORVASC) 5 mg tablet TAKE 1 TABLET BY MOUTH EVERY DAY 90 Tab 1  
 metFORMIN (GLUCOPHAGE) 500 mg tablet TAKE 1 TABLET BY MOUTH TWICE A DAY WITH MEALS 180 Tab 1  
 lamoTRIgine (LAMICTAL) 100 mg tablet TAKE 1 TABLET BY MOUTH EVERY DAY 90 Tab 1  cetirizine (ZYRTEC) 10 mg tablet Take 1 Tab by mouth daily. 30 Tab 5  levothyroxine (SYNTHROID) 150 mcg tablet Take 1 Tab by mouth Daily (before breakfast). Indications: hypothyroidism 90 Tab 1  
 escitalopram oxalate (LEXAPRO) 20 mg tablet TAKE 2 TABLETS BY MOUTH DAILY 180 Tab 1  pravastatin (PRAVACHOL) 40 mg tablet TAKE 1 TABLET BY MOUTH EVERY NIGHT 90 Tab 1  
  omeprazole (PRILOSEC) 20 mg capsule TAKE 1 CAPSULE BY MOUTH TWICE DAILY 60 Cap 5 Allergies Allergen Reactions  No Allergy Information Available Other (comments)  
  unresponsive  No Known Drug Allergies Unknown (comments) NKDA per pt's  Past Medical History:  
Diagnosis Date  Depression  Diabetes ([address]  Gastrointestinal disorder   
 gerd  High cholesterol  Obese  Other ill-defined conditions(799.89)   
 fibromyalgia  Psychiatric disorder   
 depression Social History Tobacco Use  Smoking status: Former Smoker  Smokeless tobacco: Never Used Substance Use Topics  Alcohol use: No  
  Alcohol/week: 0.0 oz  
  Comment: rarely Review of Systems Pertinent items are noted in HPI. Objective:  
 
Visit Vitals /64 (BP 1 Location: Right arm, BP Patient Position: Sitting) Comment: Manual  
Pulse 77 Temp 98.9 °F (37.2 °C) (Oral) Comment: . Resp 18 Ht 5' 6\" (1.676 m) Wt 251 lb (113.9 kg) SpO2 98% BMI 40.51 kg/m² General appearance - alert, well appearing, and in no distress Eyes - pupils equal and reactive, extraocular eye movements intact, sclera anicteric Oropharyngx - mucous membranes moist, pharynx normal without lesions Neck - supple, no significant adenopathy, carotids upstroke normal bilaterally, no bruits, thyroid exam: thyroid is normal in size without nodules or tenderness Chest - clear to auscultation, no wheezes, rales or rhonchi, symmetric air entry, no tachypnea, retractions or cyanosis Heart - normal rate, regular rhythm, normal S1, S2, no murmurs, rubs, clicks or gallops Abdomen - soft, nontender, nondistended, no masses or organomegaly 
bowel sounds normal 
Neurological - alert, oriented, normal speech, no focal findings or movement disorder noted Extremities - peripheral pulses normal, no pedal edema, no clubbing or cyanosis Assessment/Plan: Raymundo Hanna is a 59 y.o. female seen for:  
 
1. Routine physical examination 2. Fatigue, unspecified type 
- CBC WITH AUTOMATED DIFF 
- METABOLIC PANEL, COMPREHENSIVE 
- TSH AND FREE T4 
- VITAMIN D, 25 HYDROXY 
- VITAMIN B12 
 
3. Obesity, morbid ([address]): I have reviewed/discussed the above normal BMI with the patient. I have recommended the following interventions: dietary management education, guidance, and counseling and encourage exercise. 4. Type 2 diabetes mellitus without complication, without long-term current use of insulin (HCC) 
- HEMOGLOBIN A1C WITH EAG 
 
5. Acquired hypothyroidism: continue with levothyroxine. Check TSH.  
- TSH AND FREE T4 
 
6. Essential hypertension: controlled, continue with current therapy. - CBC WITH AUTOMATED DIFF 
- METABOLIC PANEL, COMPREHENSIVE 7. Hypercholesterolemia: stable, continue with statin therapy. Check fasting labs. - pravastatin (PRAVACHOL) 40 mg tablet; TAKE 1 TABLET BY MOUTH EVERY NIGHT  Dispense: 90 Tab; Refill: 1 
- METABOLIC PANEL, COMPREHENSIVE 
- LIPID PANEL 8. Encounter for immunization 
- varicella-zoster recombinant, PF, (SHINGRIX, PF,) 50 mcg/0.5 mL susr injection; 0.5mL by IntraMUSCular route once now and then repeat in 2-6 months  Dispense: 0.5 mL; Refill: 1 9. Screening for malignant neoplasm of breast: screening mammogram ordered. - [facility] MAMMO BI SCREENING INCL CAD; Future I have discussed the diagnosis with the patient and the intended plan as seen in the above orders. The patient has received an after-visit summary and questions were answered concerning future plans. I have discussed medication side effects and warnings with the patient as well. Patient verbalizes understanding of plan of care and denies further questions or concerns at this time. Informed patient to return to the office if symptoms worsen or if new symptoms arise. Follow-up and Dispositions · Return in about 6 months (around 10/15/2019) for follow up or sooner as needed.

## 2019-04-16 LAB
25(OH)D3+25(OH)D2 SERPL-MCNC: 23.3 NG/ML (ref 30–100)
ALBUMIN SERPL-MCNC: 3.9 G/DL (ref 3.6–4.8)
ALBUMIN/GLOB SERPL: 1.5 {RATIO} (ref 1.2–2.2)
ALP SERPL-CCNC: 72 IU/L (ref 39–117)
ALT SERPL-CCNC: 52 IU/L (ref 0–32)
AST SERPL-CCNC: 67 IU/L (ref 0–40)
BASOPHILS # BLD AUTO: 0 X10E3/UL (ref 0–0.2)
BASOPHILS NFR BLD AUTO: 1 %
BILIRUB SERPL-MCNC: 0.6 MG/DL (ref 0–1.2)
BUN SERPL-MCNC: 10 MG/DL (ref 8–27)
BUN/CREAT SERPL: 12 (ref 12–28)
CALCIUM SERPL-MCNC: 8.9 MG/DL (ref 8.7–10.3)
CHLORIDE SERPL-SCNC: 105 MMOL/L (ref 96–106)
CHOLEST SERPL-MCNC: 191 MG/DL (ref 100–199)
CO2 SERPL-SCNC: 24 MMOL/L (ref 20–29)
CREAT SERPL-MCNC: 0.84 MG/DL (ref 0.57–1)
EOSINOPHIL # BLD AUTO: 0.2 X10E3/UL (ref 0–0.4)
EOSINOPHIL NFR BLD AUTO: 3 %
ERYTHROCYTE [DISTWIDTH] IN BLOOD BY AUTOMATED COUNT: 14.7 % (ref 12.3–15.4)
EST. AVERAGE GLUCOSE BLD GHB EST-MCNC: 114 MG/DL
GLOBULIN SER CALC-MCNC: 2.6 G/DL (ref 1.5–4.5)
GLUCOSE SERPL-MCNC: 90 MG/DL (ref 65–99)
HBA1C MFR BLD: 5.6 % (ref 4.8–5.6)
HCT VFR BLD AUTO: 38.8 % (ref 34–46.6)
HDLC SERPL-MCNC: 32 MG/DL
HGB BLD-MCNC: 12.7 G/DL (ref 11.1–15.9)
IMM GRANULOCYTES # BLD AUTO: 0 X10E3/UL (ref 0–0.1)
IMM GRANULOCYTES NFR BLD AUTO: 0 %
INTERPRETATION, 910389: NORMAL
LDLC SERPL CALC-MCNC: 123 MG/DL (ref 0–99)
LYMPHOCYTES # BLD AUTO: 1.5 X10E3/UL (ref 0.7–3.1)
LYMPHOCYTES NFR BLD AUTO: 30 %
Lab: NORMAL
MCH RBC QN AUTO: 28.9 PG (ref 26.6–33)
MCHC RBC AUTO-ENTMCNC: 32.7 G/DL (ref 31.5–35.7)
MCV RBC AUTO: 88 FL (ref 79–97)
MONOCYTES # BLD AUTO: 0.3 X10E3/UL (ref 0.1–0.9)
MONOCYTES NFR BLD AUTO: 6 %
NEUTROPHILS # BLD AUTO: 3 X10E3/UL (ref 1.4–7)
NEUTROPHILS NFR BLD AUTO: 60 %
PLATELET # BLD AUTO: 243 X10E3/UL (ref 150–379)
POTASSIUM SERPL-SCNC: 4.3 MMOL/L (ref 3.5–5.2)
PROT SERPL-MCNC: 6.5 G/DL (ref 6–8.5)
RBC # BLD AUTO: 4.39 X10E6/UL (ref 3.77–5.28)
SODIUM SERPL-SCNC: 143 MMOL/L (ref 134–144)
T4 FREE SERPL-MCNC: 1.43 NG/DL (ref 0.82–1.77)
TRIGL SERPL-MCNC: 182 MG/DL (ref 0–149)
TSH SERPL DL<=0.005 MIU/L-ACNC: 8.52 UIU/ML (ref 0.45–4.5)
VIT B12 SERPL-MCNC: 384 PG/ML (ref 232–1245)
VLDLC SERPL CALC-MCNC: 36 MG/DL (ref 5–40)
WBC # BLD AUTO: 5 X10E3/UL (ref 3.4–10.8)

## 2019-05-08 ENCOUNTER — TELEPHONE (OUTPATIENT)
Dept: FAMILY MEDICINE CLINIC | Age: 64
End: 2019-05-08

## 2019-05-08 DIAGNOSIS — E03.9 ACQUIRED HYPOTHYROIDISM: Primary | ICD-10-CM

## 2019-05-08 DIAGNOSIS — R79.89 ABNORMAL LIVER FUNCTION TESTS: ICD-10-CM

## 2019-05-08 NOTE — TELEPHONE ENCOUNTER
Patient is to get her lab results that were done back in early April.   Please call her back at [phone]

## 2019-05-09 RX ORDER — LEVOTHYROXINE SODIUM 175 UG/1
175 TABLET ORAL
Qty: 90 TAB | Refills: 1 | Status: SHIPPED | OUTPATIENT
Start: 2019-05-09 | End: 2019-11-04 | Stop reason: SDUPTHER

## 2019-05-09 NOTE — TELEPHONE ENCOUNTER
Patient called and labs discussed. - Hypothyroidism: increase levothyroxine to 175 mcg daily. Return in 6 weeks for TSH and lab ordered. - Lipids not well controlled and she admits to not taking pravastatin. Recommend that she restart and plan to recheck fasting lipids in 3-6 months.   - Vitamin D insufficiency: recommend OTC vitamin D supplement of at least 800 units daily.   - Mild elevation in liver function. Observe and repeat in 6 weeks. Patient verbalizes understanding and all questions were answered. Orders Placed This Encounter    TSH AND FREE T4    HEPATIC FUNCTION PANEL    levothyroxine (SYNTHROID) 175 mcg tablet     Sig: Take 1 Tab by mouth Daily (before breakfast). Dispense:  90 Tab     Refill:  1     Dose adjusted.

## 2019-05-11 DIAGNOSIS — E03.9 ACQUIRED HYPOTHYROIDISM: Chronic | ICD-10-CM

## 2019-05-12 RX ORDER — LEVOTHYROXINE SODIUM 150 UG/1
TABLET ORAL
Qty: 90 TAB | Refills: 1 | OUTPATIENT
Start: 2019-05-12

## 2019-05-24 DIAGNOSIS — F34.1 DYSTHYMIA: ICD-10-CM

## 2019-05-24 RX ORDER — ESCITALOPRAM OXALATE 20 MG/1
TABLET ORAL
Qty: 180 TAB | Refills: 1 | Status: SHIPPED | OUTPATIENT
Start: 2019-05-24 | End: 2020-01-08 | Stop reason: SDUPTHER

## 2019-09-06 RX ORDER — DOXEPIN HYDROCHLORIDE 25 MG/1
CAPSULE ORAL
Qty: 90 CAP | Refills: 1 | Status: SHIPPED | OUTPATIENT
Start: 2019-09-06 | End: 2020-01-08 | Stop reason: SDUPTHER

## 2019-10-30 DIAGNOSIS — J20.9 ACUTE BRONCHITIS, UNSPECIFIED ORGANISM: ICD-10-CM

## 2019-11-12 RX ORDER — AMLODIPINE BESYLATE 5 MG/1
TABLET ORAL
Qty: 90 TAB | Refills: 1 | Status: SHIPPED | OUTPATIENT
Start: 2019-11-12 | End: 2020-01-08 | Stop reason: SDUPTHER

## 2020-01-08 ENCOUNTER — OFFICE VISIT (OUTPATIENT)
Dept: FAMILY MEDICINE CLINIC | Age: 65
End: 2020-01-08

## 2020-01-08 VITALS
TEMPERATURE: 98.4 F | WEIGHT: 248 LBS | OXYGEN SATURATION: 98 % | DIASTOLIC BLOOD PRESSURE: 80 MMHG | SYSTOLIC BLOOD PRESSURE: 124 MMHG | HEART RATE: 78 BPM | HEIGHT: 66 IN | RESPIRATION RATE: 18 BRPM | BODY MASS INDEX: 39.86 KG/M2

## 2020-01-08 DIAGNOSIS — E03.9 ACQUIRED HYPOTHYROIDISM: ICD-10-CM

## 2020-01-08 DIAGNOSIS — I10 ESSENTIAL HYPERTENSION: ICD-10-CM

## 2020-01-08 DIAGNOSIS — K21.9 GASTROESOPHAGEAL REFLUX DISEASE, ESOPHAGITIS PRESENCE NOT SPECIFIED: Chronic | ICD-10-CM

## 2020-01-08 DIAGNOSIS — F34.1 DYSTHYMIA: ICD-10-CM

## 2020-01-08 DIAGNOSIS — E11.9 TYPE 2 DIABETES MELLITUS WITHOUT COMPLICATION, WITHOUT LONG-TERM CURRENT USE OF INSULIN (HCC): Primary | ICD-10-CM

## 2020-01-08 DIAGNOSIS — E78.00 HYPERCHOLESTEROLEMIA: ICD-10-CM

## 2020-01-08 DIAGNOSIS — E11.9 TYPE 2 DIABETES MELLITUS WITHOUT COMPLICATION, WITHOUT LONG-TERM CURRENT USE OF INSULIN (HCC): ICD-10-CM

## 2020-01-08 LAB
ALBUMIN UR QL STRIP: 30 MG/L
CREATININE, URINE POC: 300 MG/DL
MICROALBUMIN/CREAT RATIO POC: <30 MG/G

## 2020-01-08 RX ORDER — METFORMIN HYDROCHLORIDE 500 MG/1
TABLET ORAL
Qty: 180 TAB | Refills: 1 | Status: SHIPPED | OUTPATIENT
Start: 2020-01-08 | End: 2020-08-14

## 2020-01-08 RX ORDER — OMEPRAZOLE 20 MG/1
CAPSULE, DELAYED RELEASE ORAL
Qty: 60 CAP | Refills: 5 | Status: SHIPPED | OUTPATIENT
Start: 2020-01-08 | End: 2021-03-18 | Stop reason: SDUPTHER

## 2020-01-08 RX ORDER — PRAVASTATIN SODIUM 40 MG/1
TABLET ORAL
Qty: 90 TAB | Refills: 1 | Status: SHIPPED | OUTPATIENT
Start: 2020-01-08 | End: 2020-07-22

## 2020-01-08 RX ORDER — LAMOTRIGINE 150 MG/1
150 TABLET ORAL DAILY
Qty: 90 TAB | Refills: 1 | Status: SHIPPED | OUTPATIENT
Start: 2020-01-08 | End: 2020-08-24

## 2020-01-08 RX ORDER — LEVOTHYROXINE SODIUM 175 UG/1
TABLET ORAL
Qty: 90 TAB | Refills: 0 | Status: SHIPPED | OUTPATIENT
Start: 2020-01-08 | End: 2020-01-15 | Stop reason: DRUGHIGH

## 2020-01-08 RX ORDER — AMLODIPINE BESYLATE 5 MG/1
5 TABLET ORAL DAILY
Qty: 90 TAB | Refills: 1 | Status: SHIPPED | OUTPATIENT
Start: 2020-01-08 | End: 2020-10-09

## 2020-01-08 RX ORDER — DOXEPIN HYDROCHLORIDE 25 MG/1
CAPSULE ORAL
Qty: 90 CAP | Refills: 1 | Status: SHIPPED | OUTPATIENT
Start: 2020-01-08 | End: 2020-08-24

## 2020-01-08 RX ORDER — ESCITALOPRAM OXALATE 20 MG/1
TABLET ORAL
Qty: 180 TAB | Refills: 1 | Status: SHIPPED | OUTPATIENT
Start: 2020-01-08 | End: 2020-09-09

## 2020-01-08 NOTE — PROGRESS NOTES
Subjective:     Laurie Hensley is a 62 y.o. female who presents for follow up of diabetes, hypertension and hyperlipidemia. Diabetes mellitus:   · Medication compliance: compliant all of the time,   · Diabetic diet compliance: compliant most of the time,   · Home glucose monitoring: is performed sporadically,   · Further diabetic ROS: no polyuria or polydipsia, no chest pain, dyspnea or TIA's, no numbness, tingling or pain in extremities, no unusual visual symptoms, no medication side effects noted. Hypertension/Hyperlipidemia:   - Diet and Lifestyle: generally follows a low fat low cholesterol diet, sedentary, nonsmoker  - Home BP Monitoring: is not measured at home    Cardiovascular ROS: taking medications as instructed, no medication side effects noted, no TIA's, no chest pain on exertion, no dyspnea on exertion, no swelling of ankles, no orthostatic dizziness or lightheadedness, no orthopnea or paroxysmal nocturnal dyspnea. Hypothyroidism: reports compliance with levothyroxine. No reported change in bowel habits, weight changes. Depression: currently on Lexapro which she has been compliant with. Current stressors include familial issues. Has been building over the past few months and this week has worsened. No currently in therapy. Denies suicidal plan or intent.    3 most recent PHQ Screens 1/8/2020   PHQ Not Done -   Little interest or pleasure in doing things Nearly every day   Feeling down, depressed, irritable, or hopeless Nearly every day   Total Score PHQ 2 6   Trouble falling or staying asleep, or sleeping too much Nearly every day   Feeling tired or having little energy Nearly every day   Poor appetite, weight loss, or overeating Nearly every day   Feeling bad about yourself - or that you are a failure or have let yourself or your family down Nearly every day   Trouble concentrating on things such as school, work, reading, or watching TV Nearly every day   Moving or speaking so slowly that other people could have noticed; or the opposite being so fidgety that others notice Nearly every day   Thoughts of being better off dead, or hurting yourself in some way Several days   PHQ 9 Score 25   How difficult have these problems made it for you to do your work, take care of your home and get along with others Very difficult         Current Outpatient Medications   Medication Sig Dispense Refill    amLODIPine (NORVASC) 5 mg tablet TAKE 1 TABLET BY MOUTH EVERY DAY 90 Tab 1    levothyroxine (SYNTHROID) 175 mcg tablet TAKE 1 TABLET BY MOUTH EVERY DAY BEFORE BREAKFAST 90 Tab 0    doxepin (SINEQUAN) 25 mg capsule TAKE 1 CAPSULE BY MOUTH EVERYDAY AT BEDTIME 90 Cap 1    escitalopram oxalate (LEXAPRO) 20 mg tablet TAKE 2 TABLETS BY MOUTH DAILY 180 Tab 1    pravastatin (PRAVACHOL) 40 mg tablet TAKE 1 TABLET BY MOUTH EVERY NIGHT 90 Tab 1    metFORMIN (GLUCOPHAGE) 500 mg tablet TAKE 1 TABLET BY MOUTH TWICE A DAY WITH MEALS 180 Tab 1    lamoTRIgine (LAMICTAL) 100 mg tablet TAKE 1 TABLET BY MOUTH EVERY DAY 90 Tab 1    omeprazole (PRILOSEC) 20 mg capsule TAKE 1 CAPSULE BY MOUTH TWICE DAILY 60 Cap 5       Allergies   Allergen Reactions    No Allergy Information Available Other (comments)     unresponsive    No Known Drug Allergies Unknown (comments)     NKDA per pt's        Past Medical History:   Diagnosis Date    Depression     Diabetes ([address]     Gastrointestinal disorder     gerd    High cholesterol     Obese     Other ill-defined conditions(799.89)     fibromyalgia    Psychiatric disorder     depression       Social History     Tobacco Use    Smoking status: Former Smoker    Smokeless tobacco: Never Used   Substance Use Topics    Alcohol use: No     Alcohol/week: 0.0 standard drinks     Comment: rarely         Review of Systems, additional:  Pertinent items are noted in HPI.     Objective:     Visit Vitals  /80 (BP 1 Location: Right arm, BP Patient Position: Sitting) Comment: Manual Pulse 78   Temp 98.4 °F (36.9 °C) (Oral) Comment: .   Resp 18   Ht 5' 6\" (1.676 m)   Wt 248 lb (112.5 kg)   SpO2 98%   BMI 40.03 kg/m²     General appearance - alert, well appearing, and in no distress  Mental status - alert, oriented to person, place, and time, depressed mood  Eyes - pupils equal and reactive, extraocular eye movements intact, sclera anicteric  Neck - supple, no significant adenopathy, thyroid exam: thyroid is normal in size without nodules or tenderness  Chest - clear to auscultation, no wheezes, rales or rhonchi, symmetric air entry, no tachypnea, retractions or cyanosis  Heart - normal rate, regular rhythm, normal S1, S2, no murmurs, rubs, clicks or gallops  Neurological - alert, oriented, normal speech, no focal findings or movement disorder noted  Extremities - peripheral pulses normal, no pedal edema, no clubbing or cyanosis    Lab review: orders written for new lab studies as appropriate; see orders. Assessment/Plan:   Mirian Mcdonald is a 62 y.o. female seen today for:     1. Type 2 diabetes mellitus without complication, without long-term current use of insulin ([facility]): controlled, continue with current medications. Check labs. - AMB POC URINE, MICROALBUMIN, SEMIQUANT (3 RESULTS)  - HEMOGLOBIN A1C WITH EAG; Future  - METABOLIC PANEL, COMPREHENSIVE; Future  - CBC W/O DIFF; Future  - metFORMIN (GLUCOPHAGE) 500 mg tablet; TAKE 1 TABLET BY MOUTH TWICE A DAY WITH MEALS  Dispense: 180 Tab; Refill: 1    2. Essential hypertension: controlled, continue with current therapy. - METABOLIC PANEL, COMPREHENSIVE; Future  - CBC W/O DIFF; Future  - amLODIPine (NORVASC) 5 mg tablet; Take 1 Tab by mouth daily. Dispense: 90 Tab; Refill: 1    3. Dysthymia: worsening, increase Lamictal to 150 mg and continue with current dose of Lexapro. Return in 4 weeks for follow up or sooner as needed. May need psychiatric evaluation if no improvement. - lamoTRIgine (LAMICTAL) 150 mg tablet;  Take 1 Tab by mouth daily.  Dispense: 90 Tab; Refill: 1  - escitalopram oxalate (LEXAPRO) 20 mg tablet; TAKE 2 TABLETS BY MOUTH DAILY  Dispense: 180 Tab; Refill: 1    4. Hypercholesterolemia: stable, continue with statin therapy. - METABOLIC PANEL, COMPREHENSIVE; Future  - pravastatin (PRAVACHOL) 40 mg tablet; TAKE 1 TABLET BY MOUTH EVERY NIGHT  Dispense: 90 Tab; Refill: 1    5. Acquired hypothyroidism: last check abnormal and did not return for repeat. Check TFTs and titrate dose of levothyroxine as needed. - TSH 3RD GENERATION; Future  - T4, FREE; Future  - levothyroxine (SYNTHROID) 175 mcg tablet; TAKE 1 TABLET BY MOUTH EVERY DAY BEFORE BREAKFAST  Dispense: 90 Tab; Refill: 0    6. Gastroesophageal reflux disease, esophagitis presence not specified: stable, continue with PPI therapy. - omeprazole (PRILOSEC) 20 mg capsule; TAKE 1 CAPSULE BY MOUTH TWICE DAILY  Dispense: 60 Cap; Refill: 5    I have discussed the diagnosis with the patient and the intended plan as seen in the above orders. The patient has received an after-visit summary and questions were answered concerning future plans. I have discussed medication side effects and warnings with the patient as well. Patient verbalizes understanding of plan of care and denies further questions or concerns at this time. Informed patient to return to the office if symptoms worsen or if new symptoms arise. Follow-up and Dispositions    · Return in about 4 weeks (around 2/5/2020) for depression follow up or sooner as needed.

## 2020-01-08 NOTE — PATIENT INSTRUCTIONS
A Healthy Lifestyle: Care Instructions  Your Care Instructions    A healthy lifestyle can help you feel good, stay at a healthy weight, and have plenty of energy for both work and play. A healthy lifestyle is something you can share with your whole family. A healthy lifestyle also can lower your risk for serious health problems, such as high blood pressure, heart disease, and diabetes. You can follow a few steps listed below to improve your health and the health of your family. Follow-up care is a key part of your treatment and safety. Be sure to make and go to all appointments, and call your doctor if you are having problems. It's also a good idea to know your test results and keep a list of the medicines you take. How can you care for yourself at home? · Do not eat too much sugar, fat, or fast foods. You can still have dessert and treats now and then. The goal is moderation. · Start small to improve your eating habits. Pay attention to portion sizes, drink less juice and soda pop, and eat more fruits and vegetables. ? Eat a healthy amount of food. A 3-ounce serving of meat, for example, is about the size of a deck of cards. Fill the rest of your plate with vegetables and whole grains. ? Limit the amount of soda and sports drinks you have every day. Drink more water when you are thirsty. ? Eat at least 5 servings of fruits and vegetables every day. It may seem like a lot, but it is not hard to reach this goal. A serving or helping is 1 piece of fruit, 1 cup of vegetables, or 2 cups of leafy, raw vegetables. Have an apple or some carrot sticks as an afternoon snack instead of a candy bar. Try to have fruits and/or vegetables at every meal.  · Make exercise part of your daily routine. You may want to start with simple activities, such as walking, bicycling, or slow swimming. Try to be active 30 to 60 minutes every day. You do not need to do all 30 to 60 minutes all at once.  For example, you can exercise 3 times a day for 10 or 20 minutes. Moderate exercise is safe for most people, but it is always a good idea to talk to your doctor before starting an exercise program.  · Keep moving. Mobile Gut the lawn, work in the garden, or ZIP[employer]. Take the stairs instead of the elevator at work. · If you smoke, quit. People who smoke have an increased risk for heart attack, stroke, cancer, and other lung illnesses. Quitting is hard, but there are ways to boost your chance of quitting tobacco for good. ? Use nicotine gum, patches, or lozenges. ? Ask your doctor about stop-smoking programs and medicines. ? Keep trying. In addition to reducing your risk of diseases in the future, you will notice some benefits soon after you stop using tobacco. If you have shortness of breath or asthma symptoms, they will likely get better within a few weeks after you quit. · Limit how much alcohol you drink. Moderate amounts of alcohol (up to 2 drinks a day for men, 1 drink a day for women) are okay. But drinking too much can lead to liver problems, high blood pressure, and other health problems. Family health  If you have a family, there are many things you can do together to improve your health. · Eat meals together as a family as often as possible. · Eat healthy foods. This includes fruits, vegetables, lean meats and dairy, and whole grains. · Include your family in your fitness plan. Most people think of activities such as jogging or tennis as the way to fitness, but there are many ways you and your family can be more active. Anything that makes you breathe hard and gets your heart pumping is exercise. Here are some tips:  ? Walk to do errands or to take your child to school or the bus.  ? Go for a family bike ride after dinner instead of watching TV. Where can you learn more? Go to http://kelli-carl.info/. Enter I781 in the search box to learn more about \"A Healthy Lifestyle: Care Instructions. \"  Current as of: May 28, 2019  Content Version: 12.2  © 9607-8269 [employer]. Care instructions adapted under license by [employer] (which disclaims liability or warranty for this information). If you have questions about a medical condition or this instruction, always ask your healthcare professional. [address] any warranty or liability for your use of this information. Recovering From Depression: Care Instructions  Your Care Instructions    Taking good care of yourself is important as you recover from depression. In time, your symptoms will fade as your treatment takes hold. Do not give up. Instead, focus your energy on getting better. Your mood will improve. It just takes some time. Focus on things that can help you feel better, such as being with friends and family, eating well, and getting enough rest. But take things slowly. Do not do too much too soon. You will begin to feel better gradually. Follow-up care is a key part of your treatment and safety. Be sure to make and go to all appointments, and call your doctor if you are having problems. It's also a good idea to know your test results and keep a list of the medicines you take. How can you care for yourself at home? Be realistic  · If you have a large task to do, break it up into smaller steps you can handle, and just do what you can. · You may want to put off important decisions until your depression has lifted. If you have plans that will have a major impact on your life, such as marriage, divorce, or a job change, try to wait a bit. Talk it over with friends and loved ones who can help you look at the overall picture first.  · Reaching out to people for help is important. Do not isolate yourself. Let your family and friends help you. Find someone you can trust and confide in, and talk to that person. · Be patient, and be kind to yourself.  Remember that depression is not your fault and is not something you can overcome with willpower alone. Treatment is necessary for depression, just like for any other illness. Feeling better takes time, and your mood will improve little by little. Stay active  · Stay busy and get outside. Take a walk, or try some other light exercise. · Talk with your doctor about an exercise program. Exercise can help with mild depression. · Go to a movie or concert. Take part in a Christian activity or other social gathering. Go to a ball game. · Ask a friend to have dinner with you. Take care of yourself  · Eat a balanced diet with plenty of fresh fruits and vegetables, whole grains, and lean protein. If you have lost your appetite, eat small snacks rather than large meals. · Avoid drinking alcohol or using illegal drugs. Do not take medicines that have not been prescribed for you. They may interfere with medicines you may be taking for depression, or they may make your depression worse. · Take your medicines exactly as they are prescribed. You may start to feel better within 1 to 3 weeks of taking antidepressant medicine. But it can take as many as 6 to 8 weeks to see more improvement. If you have questions or concerns about your medicines, or if you do not notice any improvement by 3 weeks, talk to your doctor. · If you have any side effects from your medicine, tell your doctor. Antidepressants can make you feel tired, dizzy, or nervous. Some people have dry mouth, constipation, headaches, sexual problems, or diarrhea. Many of these side effects are mild and will go away on their own after you have been taking the medicine for a few weeks. Some may last longer. Talk to your doctor if side effects are bothering you too much. You might be able to try a different medicine. · Get enough sleep. If you have problems sleeping:  ? Go to bed at the same time every night, and get up at the same time every morning. ? Keep your bedroom dark and quiet.   ? Do not exercise after 5:00 p.m.  ? Avoid drinks with caffeine after 5:00 p.m. · Avoid sleeping pills unless they are prescribed by the doctor treating your depression. Sleeping pills may make you groggy during the day, and they may interact with other medicine you are taking. · If you have any other illnesses, such as diabetes, heart disease, or high blood pressure, make sure to continue with your treatment. Tell your doctor about all of the medicines you take, including those with or without a prescription. · Keep the numbers for these national suicide hotlines: 2-212-915-TALK (5-365.944.3105) and 1-001-VPSURLU (7-629.587.1034). If you or someone you know talks about suicide or feeling hopeless, get help right away. When should you call for help? Call 911 anytime you think you may need emergency care. For example, call if:    · You feel like hurting yourself or someone else.     · Someone you know has depression and is about to attempt or is attempting suicide.   Latesha Rucks your doctor now or seek immediate medical care if:    · You hear voices.     · Someone you know has depression and:  ? Starts to give away his or her possessions. ? Uses illegal drugs or drinks alcohol heavily. ? Talks or writes about death, including writing suicide notes or talking about guns, knives, or pills. ? Starts to spend a lot of time alone. ? Acts very aggressively or suddenly appears calm.    Watch closely for changes in your health, and be sure to contact your doctor if:    · You do not get better as expected. Where can you learn more? Go to http://kelli-carl.info/. Enter C690 in the search box to learn more about \"Recovering From Depression: Care Instructions. \"  Current as of: May 28, 2019  Content Version: 12.2  © 8470-0617 [employer]. Care instructions adapted under license by [employer] (which disclaims liability or warranty for this information).  If you have questions about a medical condition or this instruction, always ask your healthcare professional. [place] [address] any warranty or liability for your use of this information.

## 2020-01-08 NOTE — PROGRESS NOTES
Identified pt with two pt identifiers(name and ).     Chief Complaint   Patient presents with    Depression    Rib Pain    Fatigue    Diabetes    Labs     patient is not fasting        Health Maintenance Due   Topic    Pneumococcal 0-64 years (1 of 1 - PPSV23)    EYE EXAM RETINAL OR DILATED     Shingrix Vaccine Age 50> (1 of 2)    BREAST CANCER SCRN MAMMOGRAM     FOOT EXAM Q1     MICROALBUMIN Q1     HEMOGLOBIN A1C Q6M        Wt Readings from Last 3 Encounters:   20 248 lb (112.5 kg)   04/15/19 251 lb (113.9 kg)   19 264 lb 15.9 oz (120.2 kg)     Temp Readings from Last 3 Encounters:   20 98.4 °F (36.9 °C) (Oral)   04/15/19 98.9 °F (37.2 °C) (Oral)   19 97.8 °F (36.6 °C)     BP Readings from Last 3 Encounters:   20 124/80   04/15/19 118/64   19 144/68     Pulse Readings from Last 3 Encounters:   20 78   04/15/19 77   19 76         Learning Assessment:  :     Learning Assessment 2014   PRIMARY LEARNER Patient   HIGHEST LEVEL OF EDUCATION - PRIMARY LEARNER  DID NOT GRADUATE HIGH SCHOOL   BARRIERS PRIMARY LEARNER NONE   CO-LEARNER CAREGIVER No   PRIMARY LANGUAGE ENGLISH   LEARNER PREFERENCE PRIMARY OTHER (COMMENT)   ANSWERED BY patient   RELATIONSHIP SELF       Depression Screening:  :     3 most recent PHQ Screens 2020   PHQ Not Done -   Little interest or pleasure in doing things Nearly every day   Feeling down, depressed, irritable, or hopeless Nearly every day   Total Score PHQ 2 6   Trouble falling or staying asleep, or sleeping too much Nearly every day   Feeling tired or having little energy Nearly every day   Poor appetite, weight loss, or overeating Nearly every day   Feeling bad about yourself - or that you are a failure or have let yourself or your family down Nearly every day   Trouble concentrating on things such as school, work, reading, or watching TV Nearly every day   Moving or speaking so slowly that other people could have noticed; or the opposite being so fidgety that others notice Nearly every day   Thoughts of being better off dead, or hurting yourself in some way Several days   PHQ 9 Score 25   How difficult have these problems made it for you to do your work, take care of your home and get along with others Very difficult       Fall Risk Assessment:  :     Fall Risk Assessment, last 12 mths 10/11/2017   Able to walk? Yes   Fall in past 12 months? No       Abuse Screening:  :     Abuse Screening Questionnaire 1/8/2020 10/1/2018 10/11/2017 10/10/2016 5/13/2015   Do you ever feel afraid of your partner? N N N N N   Are you in a relationship with someone who physically or mentally threatens you? N N N N N   Is it safe for you to go home? Y Y Y Y Y       Coordination of Care Questionnaire:  :     1) Have you been to an emergency room, urgent care clinic since your last visit? no   Hospitalized since your last visit? no             2) Have you seen or consulted any other health care providers outside of [address] [place] [address] since your last visit? no  (Include any pap smears or colon screenings in this section.)    3) Do you have an Advance Directive on file? no  Are you interested in receiving information about Advance Directives? no    Reviewed record in preparation for visit and have obtained necessary documentation. Medication reconciliation up to date and corrected with patient at this time.

## 2020-01-09 LAB
ALBUMIN SERPL-MCNC: 4.4 G/DL (ref 3.6–4.8)
ALBUMIN/GLOB SERPL: 1.6 {RATIO} (ref 1.2–2.2)
ALP SERPL-CCNC: 83 IU/L (ref 39–117)
ALT SERPL-CCNC: 43 IU/L (ref 0–32)
AST SERPL-CCNC: 42 IU/L (ref 0–40)
BILIRUB SERPL-MCNC: 0.5 MG/DL (ref 0–1.2)
BUN SERPL-MCNC: 13 MG/DL (ref 8–27)
BUN/CREAT SERPL: 18 (ref 12–28)
CALCIUM SERPL-MCNC: 9.5 MG/DL (ref 8.7–10.3)
CHLORIDE SERPL-SCNC: 104 MMOL/L (ref 96–106)
CO2 SERPL-SCNC: 23 MMOL/L (ref 20–29)
CREAT SERPL-MCNC: 0.74 MG/DL (ref 0.57–1)
ERYTHROCYTE [DISTWIDTH] IN BLOOD BY AUTOMATED COUNT: 13.3 % (ref 11.7–15.4)
EST. AVERAGE GLUCOSE BLD GHB EST-MCNC: 117 MG/DL
GLOBULIN SER CALC-MCNC: 2.7 G/DL (ref 1.5–4.5)
GLUCOSE SERPL-MCNC: 114 MG/DL (ref 65–99)
HBA1C MFR BLD: 5.7 % (ref 4.8–5.6)
HCT VFR BLD AUTO: 40.8 % (ref 34–46.6)
HGB BLD-MCNC: 13.6 G/DL (ref 11.1–15.9)
MCH RBC QN AUTO: 28.5 PG (ref 26.6–33)
MCHC RBC AUTO-ENTMCNC: 33.3 G/DL (ref 31.5–35.7)
MCV RBC AUTO: 85 FL (ref 79–97)
PLATELET # BLD AUTO: 283 X10E3/UL (ref 150–450)
POTASSIUM SERPL-SCNC: 4.2 MMOL/L (ref 3.5–5.2)
PROT SERPL-MCNC: 7.1 G/DL (ref 6–8.5)
RBC # BLD AUTO: 4.78 X10E6/UL (ref 3.77–5.28)
SODIUM SERPL-SCNC: 143 MMOL/L (ref 134–144)
T4 FREE SERPL-MCNC: 0.93 NG/DL (ref 0.82–1.77)
TSH SERPL DL<=0.005 MIU/L-ACNC: 32.34 UIU/ML (ref 0.45–4.5)
WBC # BLD AUTO: 6.3 X10E3/UL (ref 3.4–10.8)

## 2020-01-15 ENCOUNTER — TELEPHONE (OUTPATIENT)
Dept: FAMILY MEDICINE CLINIC | Age: 65
End: 2020-01-15

## 2020-01-15 DIAGNOSIS — E03.9 ACQUIRED HYPOTHYROIDISM: Primary | ICD-10-CM

## 2020-01-15 RX ORDER — LEVOTHYROXINE SODIUM 200 UG/1
200 TABLET ORAL
Qty: 90 TAB | Refills: 1 | Status: SHIPPED | OUTPATIENT
Start: 2020-01-15 | End: 2020-06-15 | Stop reason: DRUGHIGH

## 2020-01-15 NOTE — TELEPHONE ENCOUNTER
Patient returned phone call. Has appointment scheduled for 2/10/2020. Hypothyroidism not controlled and she reports compliance with medication. Increase levothyroxine to 200 mcg daily and plan for repeat test in 6-8 weeks. Pt verbalizes understanding.

## 2020-01-15 NOTE — TELEPHONE ENCOUNTER
----- Message from Dayana Camilo MD sent at 1/15/2020 12:45 PM EST -----  Labs are abnormal. Please return to the office to further discuss and for medication adjustment.

## 2020-02-10 ENCOUNTER — OFFICE VISIT (OUTPATIENT)
Dept: FAMILY MEDICINE CLINIC | Age: 65
End: 2020-02-10

## 2020-02-10 VITALS
RESPIRATION RATE: 18 BRPM | OXYGEN SATURATION: 98 % | HEART RATE: 78 BPM | SYSTOLIC BLOOD PRESSURE: 116 MMHG | HEIGHT: 66 IN | BODY MASS INDEX: 42.91 KG/M2 | DIASTOLIC BLOOD PRESSURE: 60 MMHG | WEIGHT: 267 LBS | TEMPERATURE: 98 F

## 2020-02-10 DIAGNOSIS — E03.9 ACQUIRED HYPOTHYROIDISM: Chronic | ICD-10-CM

## 2020-02-10 DIAGNOSIS — E66.01 CLASS 3 SEVERE OBESITY WITHOUT SERIOUS COMORBIDITY WITH BODY MASS INDEX (BMI) OF 40.0 TO 44.9 IN ADULT, UNSPECIFIED OBESITY TYPE (HCC): ICD-10-CM

## 2020-02-10 DIAGNOSIS — F34.1 DYSTHYMIA: Primary | ICD-10-CM

## 2020-02-10 DIAGNOSIS — Z12.39 SCREENING FOR MALIGNANT NEOPLASM OF BREAST: ICD-10-CM

## 2020-02-10 RX ORDER — BENZONATATE 100 MG/1
CAPSULE ORAL
Qty: 21 CAP | Refills: 0 | OUTPATIENT
Start: 2020-02-10

## 2020-02-10 RX ORDER — PRAVASTATIN SODIUM 40 MG/1
TABLET ORAL
Qty: 90 TAB | Refills: 1 | OUTPATIENT
Start: 2020-02-10

## 2020-02-10 RX ORDER — OMEPRAZOLE 20 MG/1
CAPSULE, DELAYED RELEASE ORAL
Qty: 60 CAP | Refills: 5 | OUTPATIENT
Start: 2020-02-10

## 2020-02-10 NOTE — PROGRESS NOTES
Subjective:      Miguel Guthrie is a 62 y.o. female here for follow up. Due to new year, she has not picked up several of her medications. Has not been on Lamictal for over a month due to cost of medication. Reports that she feels as though her mood has bene stable and does not feel as depressed as she did a month ago. Has been taking Lexapro as prescribed. She has her bad days where she is depressed. No reported SI/HI. Hypothyroidism: has been taking 1.5 tablets of levothyroxine 150 mg; has not yet filled 200 mg prescription. Due for repeat labs in 4 weeks. Current Outpatient Medications   Medication Sig Dispense Refill    levothyroxine (SYNTHROID) 200 mcg tablet Take 1 Tab by mouth Daily (before breakfast). 90 Tab 1    doxepin (SINEQUAN) 25 mg capsule TAKE 1 CAPSULE BY MOUTH EVERYDAY AT BEDTIME 90 Cap 1    escitalopram oxalate (LEXAPRO) 20 mg tablet TAKE 2 TABLETS BY MOUTH DAILY 180 Tab 1    pravastatin (PRAVACHOL) 40 mg tablet TAKE 1 TABLET BY MOUTH EVERY NIGHT 90 Tab 1    metFORMIN (GLUCOPHAGE) 500 mg tablet TAKE 1 TABLET BY MOUTH TWICE A DAY WITH MEALS 180 Tab 1    amLODIPine (NORVASC) 5 mg tablet Take 1 Tab by mouth daily. 90 Tab 1    lamoTRIgine (LAMICTAL) 150 mg tablet Take 1 Tab by mouth daily.  90 Tab 1    omeprazole (PRILOSEC) 20 mg capsule TAKE 1 CAPSULE BY MOUTH TWICE DAILY 60 Cap 5       Allergies   Allergen Reactions    No Allergy Information Available Other (comments)     unresponsive    No Known Drug Allergies Unknown (comments)     NKDA per pt's        Past Medical History:   Diagnosis Date    Depression     [facility] ([facility] [address]     Gastrointestinal disorder     gerd    High cholesterol     Obese     Other ill-defined conditions(799.89)     fibromyalgia    Psychiatric disorder     depression       Social History     Tobacco Use    Smoking status: Former Smoker    Smokeless tobacco: Never Used   Substance Use Topics    Alcohol use: No     Alcohol/week: 0.0 standard drinks     Comment: rarely         Review of Systems  Pertinent items are noted in HPI. Objective:     Visit Vitals  /60 (BP 1 Location: Right arm, BP Patient Position: Sitting) Comment: Manual   Pulse 78   Temp 98 °F (36.7 °C) (Oral) Comment: .   Resp 18   Ht 5' 6\" (1.676 m)   Wt 267 lb (121.1 kg)   SpO2 98%   BMI 43.09 kg/m²      General appearance - alert, well appearing, and in no distress  Eyes - pupils equal and reactive, extraocular eye movements intact, sclera anicteric  Oropharyngx - mucous membranes moist, pharynx normal without lesions  Neck - supple, no significant adenopathy  Chest - clear to auscultation, no wheezes, rales or rhonchi, symmetric air entry, no tachypnea, retractions or cyanosis  Heart - normal rate, regular rhythm, normal S1, S2, no murmurs, rubs, clicks or gallops  Mental Status: alert, oriented to person, place, and time, normal mood, behavior, speech, dress, motor activity, and thought processes    Assessment/Plan:   Zahra Ordonez is a 62 y.o. female seen for:     1. Dysthymia: encouraged to fill Lamictal Rx, continue with Lexapro. Mood appears better today than it did a month ago. Plan to follow up in 4 weeks when she returns for labs. 2. Acquired hypothyroidism: return for TFTs in 4 weeks. - TSH 3RD GENERATION  - T4, FREE    3. Screening for malignant neoplasm of breast: screening mammogram ordered. - JOAQU[facility]N MAMMO BI SCREENING INCL CAD; Future    4. Class 3 severe obesity without serious comorbidity with body mass index (BMI) of 40.0 to 44.9 in adult, unspecified obesity type ([facility] [address]): I have reviewed/discussed the above normal BMI with the patient. I have recommended the following interventions: dietary management education, guidance, and counseling and encourage exercise. I have discussed the diagnosis with the patient and the intended plan as seen in the above orders.  The patient has received an after-visit summary and questions were answered concerning future plans. I have discussed medication side effects and warnings with the patient as well. Patient verbalizes understanding of plan of care and denies further questions or concerns at this time. Informed patient to return to the office if symptoms worsen or if new symptoms arise. Follow-up and Dispositions    · Return in about 4 weeks (around 3/9/2020) for repeat thyroid tests. Routine follow up in 3 months. Rut Babin

## 2020-02-10 NOTE — PATIENT INSTRUCTIONS
A Healthy Lifestyle: Care Instructions Your Care Instructions A healthy lifestyle can help you feel good, stay at a healthy weight, and have plenty of energy for both work and play. A healthy lifestyle is something you can share with your whole family. A healthy lifestyle also can lower your risk for serious health problems, such as high blood pressure, heart disease, and diabetes. You can follow a few steps listed below to improve your health and the health of your family. Follow-up care is a key part of your treatment and safety. Be sure to make and go to all appointments, and call your doctor if you are having problems. It's also a good idea to know your test results and keep a list of the medicines you take. How can you care for yourself at home? · Do not eat too much sugar, fat, or fast foods. You can still have dessert and treats now and then. The goal is moderation. · Start small to improve your eating habits. Pay attention to portion sizes, drink less juice and soda pop, and eat more fruits and vegetables. ? Eat a healthy amount of food. A 3-ounce serving of meat, for example, is about the size of a deck of cards. Fill the rest of your plate with vegetables and whole grains. ? Limit the amount of soda and sports drinks you have every day. Drink more water when you are thirsty. ? Eat at least 5 servings of fruits and vegetables every day. It may seem like a lot, but it is not hard to reach this goal. A serving or helping is 1 piece of fruit, 1 cup of vegetables, or 2 cups of leafy, raw vegetables. Have an apple or some carrot sticks as an afternoon snack instead of a candy bar. Try to have fruits and/or vegetables at every meal. 
· Make exercise part of your daily routine. You may want to start with simple activities, such as walking, bicycling, or slow swimming. Try to be active 30 to 60 minutes every day.  You do not need to do all 30 to 60 minutes all at once. For example, you can exercise 3 times a day for 10 or 20 minutes. Moderate exercise is safe for most people, but it is always a good idea to talk to your doctor before starting an exercise program. 
· Keep moving. Jordon Paulson the lawn, work in the garden, or [employer]. Take the stairs instead of the elevator at work. · If you smoke, quit. People who smoke have an increased risk for heart attack, stroke, cancer, and other lung illnesses. Quitting is hard, but there are ways to boost your chance of quitting tobacco for good. ? Use nicotine gum, patches, or lozenges. ? Ask your doctor about stop-smoking programs and medicines. ? Keep trying. In addition to reducing your risk of diseases in the future, you will notice some benefits soon after you stop using tobacco. If you have shortness of breath or asthma symptoms, they will likely get better within a few weeks after you quit. · Limit how much alcohol you drink. Moderate amounts of alcohol (up to 2 drinks a day for men, 1 drink a day for women) are okay. But drinking too much can lead to liver problems, high blood pressure, and other health problems. Family health If you have a family, there are many things you can do together to improve your health. · Eat meals together as a family as often as possible. · Eat healthy foods. This includes fruits, vegetables, lean meats and dairy, and whole grains. · Include your family in your fitness plan. Most people think of activities such as jogging or tennis as the way to fitness, but there are many ways you and your family can be more active. Anything that makes you breathe hard and gets your heart pumping is exercise. Here are some tips: 
? Walk to do errands or to take your child to school or the bus. 
? Go for a family bike ride after dinner instead of watching TV. Where can you learn more? Go to http://kelli-carl.info/. Enter F217 in the search box to learn more about \"A Healthy Lifestyle: Care Instructions. \" Current as of: May 28, 2019 Content Version: 12.2 © 8103-8202 [employer]. Care instructions adapted under license by [employer] (which disclaims liability or warranty for this information). If you have questions about a medical condition or this instruction, always ask your healthcare professional. [place] [address] any warranty or liability for your use of this information.

## 2020-02-10 NOTE — PROGRESS NOTES
Identified pt with two pt identifiers(name and ).     Chief Complaint   Patient presents with    Follow-up        Health Maintenance Due   Topic    Pneumococcal 0-64 years (1 of 1 - PPSV23)    Eye Exam Retinal or Dilated     Shingrix Vaccine Age 50> (1 of 2)    Breast Cancer Screen Mammogram     Foot Exam Q1        Wt Readings from Last 3 Encounters:   02/10/20 267 lb (121.1 kg)   20 248 lb (112.5 kg)   04/15/19 251 lb (113.9 kg)     Temp Readings from Last 3 Encounters:   02/10/20 98 °F (36.7 °C) (Oral)   20 98.4 °F (36.9 °C) (Oral)   04/15/19 98.9 °F (37.2 °C) (Oral)     BP Readings from Last 3 Encounters:   02/10/20 116/60   20 124/80   04/15/19 118/64     Pulse Readings from Last 3 Encounters:   02/10/20 78   20 78   04/15/19 77         Learning Assessment:  :     Learning Assessment 2014   PRIMARY LEARNER Patient   HIGHEST LEVEL OF EDUCATION - PRIMARY LEARNER  DID NOT GRADUATE HIGH SCHOOL   BARRIERS PRIMARY LEARNER NONE   CO-LEARNER CAREGIVER No   PRIMARY LANGUAGE ENGLISH   LEARNER PREFERENCE PRIMARY OTHER (COMMENT)   ANSWERED BY patient   RELATIONSHIP SELF       Depression Screening:  :     3 most recent PHQ Screens 2020   PHQ Not Done -   Little interest or pleasure in doing things Nearly every day   Feeling down, depressed, irritable, or hopeless Nearly every day   Total Score PHQ 2 6   Trouble falling or staying asleep, or sleeping too much Nearly every day   Feeling tired or having little energy Nearly every day   Poor appetite, weight loss, or overeating Nearly every day   Feeling bad about yourself - or that you are a failure or have let yourself or your family down Nearly every day   Trouble concentrating on things such as school, work, reading, or watching TV Nearly every day   Moving or speaking so slowly that other people could have noticed; or the opposite being so fidgety that others notice Nearly every day   Thoughts of being better off dead, or hurting yourself in some way Several days   PHQ 9 Score 25   How difficult have these problems made it for you to do your work, take care of your home and get along with others Very difficult       Fall Risk Assessment:  :     Fall Risk Assessment, last 12 mths 10/11/2017   Able to walk? Yes   Fall in past 12 months? No       Abuse Screening:  :     Abuse Screening Questionnaire 1/8/2020 10/1/2018 10/11/2017 10/10/2016 5/13/2015   Do you ever feel afraid of your partner? N N N N N   Are you in a relationship with someone who physically or mentally threatens you? N N N N N   Is it safe for you to go home? Y Y Y Y Y       Coordination of Care Questionnaire:  :     1) Have you been to an emergency room, urgent care clinic since your last visit? no   Hospitalized since your last visit? no             2) Have you seen or consulted any other health care providers outside of [address] [place] [address] since your last visit? no  (Include any pap smears or colon screenings in this section.)    3) Do you have an Advance Directive on file? no  Are you interested in receiving information about Advance Directives? no    Reviewed record in preparation for visit and have obtained necessary documentation. Medication reconciliation up to date and corrected with patient at this time.

## 2020-03-05 ENCOUNTER — OFFICE VISIT (OUTPATIENT)
Dept: FAMILY MEDICINE CLINIC | Age: 65
End: 2020-03-05

## 2020-03-05 VITALS
WEIGHT: 265 LBS | TEMPERATURE: 99 F | SYSTOLIC BLOOD PRESSURE: 112 MMHG | OXYGEN SATURATION: 98 % | DIASTOLIC BLOOD PRESSURE: 80 MMHG | HEART RATE: 76 BPM | BODY MASS INDEX: 42.59 KG/M2 | RESPIRATION RATE: 18 BRPM | HEIGHT: 66 IN

## 2020-03-05 DIAGNOSIS — J01.90 ACUTE BACTERIAL SINUSITIS: Primary | ICD-10-CM

## 2020-03-05 DIAGNOSIS — B96.89 ACUTE BACTERIAL SINUSITIS: Primary | ICD-10-CM

## 2020-03-05 RX ORDER — AMOXICILLIN AND CLAVULANATE POTASSIUM 875; 125 MG/1; MG/1
1 TABLET, FILM COATED ORAL EVERY 12 HOURS
Qty: 14 TAB | Refills: 0 | Status: SHIPPED | OUTPATIENT
Start: 2020-03-05 | End: 2020-03-12

## 2020-03-05 NOTE — PATIENT INSTRUCTIONS
Sinusitis: Care Instructions Your Care Instructions Sinusitis is an infection of the lining of the sinus cavities in your head. Sinusitis often follows a cold. It causes pain and pressure in your head and face. In most cases, sinusitis gets better on its own in 1 to 2 weeks. But some mild symptoms may last for several weeks. Sometimes antibiotics are needed. Follow-up care is a key part of your treatment and safety. Be sure to make and go to all appointments, and call your doctor if you are having problems. It's also a good idea to know your test results and keep a list of the medicines you take. How can you care for yourself at home? · Take an over-the-counter pain medicine, such as acetaminophen (Tylenol), ibuprofen (Advil, Motrin), or naproxen (Aleve). Read and follow all instructions on the label. · If the doctor prescribed antibiotics, take them as directed. Do not stop taking them just because you feel better. You need to take the full course of antibiotics. · Be careful when taking over-the-counter cold or flu medicines and Tylenol at the same time. Many of these medicines have acetaminophen, which is Tylenol. Read the labels to make sure that you are not taking more than the recommended dose. Too much acetaminophen (Tylenol) can be harmful. · Breathe warm, moist air from a steamy shower, a hot bath, or a sink filled with hot water. Avoid cold, dry air. Using a humidifier in your home may help. Follow the directions for cleaning the machine. · Use saline (saltwater) nasal washes to help keep your nasal passages open and wash out mucus and bacteria. You can buy saline nose drops at a grocery store or drugstore. Or you can make your own at home by adding 1 teaspoon of salt and 1 teaspoon of baking soda to 2 cups of distilled water. If you make your own, fill a bulb syringe with the solution, insert the tip into your nostril, and squeeze gently. Thena Number your nose. · Put a hot, wet towel or a warm gel pack on your face 3 or 4 times a day for 5 to 10 minutes each time. · Try a decongestant nasal spray like oxymetazoline (Afrin). Do not use it for more than 3 days in a row. Using it for more than 3 days can make your congestion worse. When should you call for help? Call your doctor now or seek immediate medical care if: 
  · You have new or worse swelling or redness in your face or around your eyes.  
  · You have a new or higher fever.  
 Watch closely for changes in your health, and be sure to contact your doctor if: 
  · You have new or worse facial pain.  
  · The mucus from your nose becomes thicker (like pus) or has new blood in it.  
  · You are not getting better as expected. Where can you learn more? Go to http://kelli-carl.info/. Enter W817 in the search box to learn more about \"Sinusitis: Care Instructions. \" Current as of: October 21, 2018 Content Version: 12.2 © 8032-4031 [employer]. Care instructions adapted under license by [employer] (which disclaims liability or warranty for this information). If you have questions about a medical condition or this instruction, always ask your healthcare professional. [address] any warranty or liability for your use of this information.

## 2020-03-05 NOTE — PROGRESS NOTES
Subjective:      Lucina Kelly is a 62 y.o. female who presents for evaluation of possible sinus infection. Symptoms include headache, sinus pressure, thick nasal discharge with no fever, chills, or night sweats. Onset of symptoms was 2 weeks ago, gradually worsening since that time. Associated with intermittent ear fullness. Eating and drinking well. Former smoker. No history of recurrent sinusitis. Evaluation to date: none  Treatment to date: OTC cold medications, Tylenol, decongestants with minimal improvement    Current Outpatient Medications   Medication Sig Dispense Refill    levothyroxine (SYNTHROID) 200 mcg tablet Take 1 Tab by mouth Daily (before breakfast). 90 Tab 1    lamoTRIgine (LAMICTAL) 150 mg tablet Take 1 Tab by mouth daily. 90 Tab 1    doxepin (SINEQUAN) 25 mg capsule TAKE 1 CAPSULE BY MOUTH EVERYDAY AT BEDTIME 90 Cap 1    escitalopram oxalate (LEXAPRO) 20 mg tablet TAKE 2 TABLETS BY MOUTH DAILY 180 Tab 1    pravastatin (PRAVACHOL) 40 mg tablet TAKE 1 TABLET BY MOUTH EVERY NIGHT 90 Tab 1    metFORMIN (GLUCOPHAGE) 500 mg tablet TAKE 1 TABLET BY MOUTH TWICE A DAY WITH MEALS 180 Tab 1    omeprazole (PRILOSEC) 20 mg capsule TAKE 1 CAPSULE BY MOUTH TWICE DAILY 60 Cap 5    amLODIPine (NORVASC) 5 mg tablet Take 1 Tab by mouth daily.  90 Tab 1       Allergies   Allergen Reactions    No Allergy Information Available Other (comments)     unresponsive    No Known Drug Allergies Unknown (comments)     NKDA per pt's        Past Medical History:   Diagnosis Date    Depression     Diabetes ([facility] [address]     Gastrointestinal disorder     gerd    High cholesterol     Obese     Other ill-defined conditions(799.89)     fibromyalgia    Psychiatric disorder     depression       Social History     Tobacco Use    Smoking status: Former Smoker    Smokeless tobacco: Never Used   Substance Use Topics    Alcohol use: No     Alcohol/week: 0.0 standard drinks     Comment: rarely        Review of Systems  Pertinent items are noted in HPI. Objective:     Visit Vitals  /80 (BP 1 Location: Right arm, BP Patient Position: Sitting) Comment: Manual   Pulse 76   Temp 99 °F (37.2 °C) (Oral) Comment: .   Resp 18   Ht 5' 6\" (1.676 m)   Wt 265 lb (120.2 kg)   SpO2 98%   BMI 42.77 kg/m²      General appearance - alert, well appearing, and in no distress  Eyes - pupils equal and reactive, extraocular eye movements intact, sclera anicteric  Ears - bilateral TM's and external ear canals normal  Nose - mucosal congestion, mucosal erythema and sinus tenderness noted maxillary sinuses   Mouth - mucous membranes moist, pharynx normal without lesions  Neck - supple, no significant adenopathy  Chest - clear to auscultation, no wheezes, rales or rhonchi, symmetric air entry, no tachypnea, retractions or cyanosis  Heart - normal rate, regular rhythm, normal S1, S2, no murmurs, rubs, clicks or gallops    Assessment/Plan:   Faraz Adkins is a 62 y.o. female seen for:     1. Acute bacterial sinusitis  - amoxicillin-clavulanate (AUGMENTIN) 875-125 mg per tablet; Take 1 Tab by mouth every twelve (12) hours for 7 days. Dispense: 14 Tab; Refill: 0  - Nasal saline rinses as needed for congestion. I have discussed the diagnosis with the patient and the intended plan as seen in the above orders. The patient has received an after-visit summary and questions were answered concerning future plans. I have discussed medication side effects and warnings with the patient as well. Informed patient to return to the office if symptoms worsen or if new symptoms arise. Follow-up and Dispositions    · Return if symptoms worsen or fail to improve.

## 2020-03-05 NOTE — PROGRESS NOTES
Identified pt with two pt identifiers(name and ).     Chief Complaint   Patient presents with    Sinus Pain     Symptoms began about a month ago    Nasal Congestion    Cough        Health Maintenance Due   Topic    Pneumococcal 0-64 years (1 of 1 - PPSV23)    Eye Exam Retinal or Dilated     Shingrix Vaccine Age 50> (1 of 2)    Breast Cancer Screen Mammogram     Foot Exam Q1        Wt Readings from Last 3 Encounters:   20 265 lb (120.2 kg)   02/10/20 267 lb (121.1 kg)   20 248 lb (112.5 kg)     Temp Readings from Last 3 Encounters:   20 99 °F (37.2 °C) (Oral)   02/10/20 98 °F (36.7 °C) (Oral)   20 98.4 °F (36.9 °C) (Oral)     BP Readings from Last 3 Encounters:   20 112/80   02/10/20 116/60   20 124/80     Pulse Readings from Last 3 Encounters:   20 76   02/10/20 78   20 78         Learning Assessment:  :     Learning Assessment 2014   PRIMARY LEARNER Patient   HIGHEST LEVEL OF EDUCATION - PRIMARY LEARNER  DID NOT GRADUATE HIGH SCHOOL   BARRIERS PRIMARY LEARNER NONE   CO-LEARNER CAREGIVER No   PRIMARY LANGUAGE ENGLISH   LEARNER PREFERENCE PRIMARY OTHER (COMMENT)   ANSWERED BY patient   RELATIONSHIP SELF       Depression Screening:  :     3 most recent PHQ Screens 2020   PHQ Not Done -   Little interest or pleasure in doing things Nearly every day   Feeling down, depressed, irritable, or hopeless Nearly every day   Total Score PHQ 2 6   Trouble falling or staying asleep, or sleeping too much Nearly every day   Feeling tired or having little energy Nearly every day   Poor appetite, weight loss, or overeating Nearly every day   Feeling bad about yourself - or that you are a failure or have let yourself or your family down Nearly every day   Trouble concentrating on things such as school, work, reading, or watching TV Nearly every day   Moving or speaking so slowly that other people could have noticed; or the opposite being so fidgety that others notice Nearly every day   Thoughts of being better off dead, or hurting yourself in some way Several days   PHQ 9 Score 25   How difficult have these problems made it for you to do your work, take care of your home and get along with others Very difficult       Fall Risk Assessment:  :     Fall Risk Assessment, last 12 mths 10/11/2017   Able to walk? Yes   Fall in past 12 months? No       Abuse Screening:  :     Abuse Screening Questionnaire 1/8/2020 10/1/2018 10/11/2017 10/10/2016 5/13/2015   Do you ever feel afraid of your partner? N N N N N   Are you in a relationship with someone who physically or mentally threatens you? N N N N N   Is it safe for you to go home? Y Y Y Y Y       Coordination of Care Questionnaire:  :     1) Have you been to an emergency room, urgent care clinic since your last visit? no   Hospitalized since your last visit? no             2) Have you seen or consulted any other health care providers outside of [address] [place] [address] since your last visit? no  (Include any pap smears or colon screenings in this section.)    3) Do you have an Advance Directive on file? no  Are you interested in receiving information about Advance Directives? no    Reviewed record in preparation for visit and have obtained necessary documentation. Medication reconciliation up to date and corrected with patient at this time.

## 2020-06-09 LAB
ALBUMIN SERPL-MCNC: 4.1 G/DL (ref 3.8–4.8)
ALBUMIN/GLOB SERPL: 1.7 {RATIO} (ref 1.2–2.2)
ALP SERPL-CCNC: 81 IU/L (ref 39–117)
ALT SERPL-CCNC: 34 IU/L (ref 0–32)
AST SERPL-CCNC: 36 IU/L (ref 0–40)
BILIRUB SERPL-MCNC: 0.4 MG/DL (ref 0–1.2)
BUN SERPL-MCNC: 9 MG/DL (ref 8–27)
BUN/CREAT SERPL: 11 (ref 12–28)
CALCIUM SERPL-MCNC: 9.5 MG/DL (ref 8.7–10.3)
CHLORIDE SERPL-SCNC: 105 MMOL/L (ref 96–106)
CO2 SERPL-SCNC: 23 MMOL/L (ref 20–29)
CREAT SERPL-MCNC: 0.83 MG/DL (ref 0.57–1)
ERYTHROCYTE [DISTWIDTH] IN BLOOD BY AUTOMATED COUNT: 13 % (ref 11.7–15.4)
EST. AVERAGE GLUCOSE BLD GHB EST-MCNC: 120 MG/DL
GLOBULIN SER CALC-MCNC: 2.4 G/DL (ref 1.5–4.5)
GLUCOSE SERPL-MCNC: 120 MG/DL (ref 65–99)
HBA1C MFR BLD: 5.8 % (ref 4.8–5.6)
HCT VFR BLD AUTO: 40.6 % (ref 34–46.6)
HGB BLD-MCNC: 13.1 G/DL (ref 11.1–15.9)
MCH RBC QN AUTO: 29 PG (ref 26.6–33)
MCHC RBC AUTO-ENTMCNC: 32.3 G/DL (ref 31.5–35.7)
MCV RBC AUTO: 90 FL (ref 79–97)
PLATELET # BLD AUTO: 252 X10E3/UL (ref 150–450)
POTASSIUM SERPL-SCNC: 4.4 MMOL/L (ref 3.5–5.2)
PROT SERPL-MCNC: 6.5 G/DL (ref 6–8.5)
RBC # BLD AUTO: 4.52 X10E6/UL (ref 3.77–5.28)
SODIUM SERPL-SCNC: 142 MMOL/L (ref 134–144)
T4 FREE SERPL-MCNC: 1.88 NG/DL (ref 0.82–1.77)
TSH SERPL DL<=0.005 MIU/L-ACNC: 0.05 UIU/ML (ref 0.45–4.5)
WBC # BLD AUTO: 5.3 X10E3/UL (ref 3.4–10.8)

## 2020-06-10 ENCOUNTER — TELEPHONE (OUTPATIENT)
Dept: FAMILY MEDICINE CLINIC | Age: 65
End: 2020-06-10

## 2020-06-10 NOTE — TELEPHONE ENCOUNTER
----- Message from Tia Connolly sent at 6/10/2020 11:05 AM EDT -----  Regarding: Dr. Linda Randhawa Message/Vendor Calls    Caller's first and last name:      Reason for call:  Lab results    Callback required yes/no and why:  Yes, to let her know if the lab results are back.      Best contact number(s):  [phone]    Details to clarify the request:      Tia Connolly

## 2020-06-11 NOTE — TELEPHONE ENCOUNTER
Pt had labs done on 06/05/2020. Please review. Thanks. Do not make important decisions/No heavy lifting/straining/Do not drive or operate machinery

## 2020-06-15 ENCOUNTER — VIRTUAL VISIT (OUTPATIENT)
Dept: FAMILY MEDICINE CLINIC | Age: 65
End: 2020-06-15

## 2020-06-15 DIAGNOSIS — E03.9 ACQUIRED HYPOTHYROIDISM: Primary | ICD-10-CM

## 2020-06-15 RX ORDER — LEVOTHYROXINE SODIUM 175 UG/1
175 TABLET ORAL
Qty: 90 TAB | Refills: 1 | Status: SHIPPED | OUTPATIENT
Start: 2020-06-15 | End: 2020-12-10

## 2020-06-15 NOTE — PROGRESS NOTES
Radha Kern is a 62 y.o. female who was seen by synchronous (real-time) audio-video technology on 6/15/2020. Consent: Radha Kern, who was seen by synchronous (real-time) audio-video technology, and/or her healthcare decision maker, is aware that this patient-initiated, Telehealth encounter on 6/15/2020 is a billable service, with coverage as determined by her insurance carrier. She is aware that she may receive a bill and has provided verbal consent to proceed: Yes. Assessment & Plan:   Diagnoses and all orders for this visit:    1. Acquired hypothyroidism: now hyperthyroid due to too much levothyroxine. Decrease levothyroxine to 175 mcg daily and repeat TFTs in 6 weeks. -     levothyroxine (SYNTHROID) 175 mcg tablet; Take 1 Tab by mouth Daily (before breakfast). -     TSH 3RD GENERATION  -     T4, FREE      Subjective:   Radha Kern is a 62 y.o. female who was seen for Abnormal Lab Results (Review labs done on 06/05/2020) and Thyroid Problem    Hypothyroidism: TSH low, free T4 elevated. Reports compliance with levothyroxine. Endorses fatigue, weight gain. No reported change in bowel habits, palpitations. Lab Results   Component Value Date/Time    TSH 0.052 (L) 06/05/2020 08:47 AM    T4, Free 1.88 (H) 06/05/2020 08:47 AM       Prior to Admission medications    Medication Sig Start Date End Date Taking? Authorizing Provider   levothyroxine (SYNTHROID) 200 mcg tablet Take 1 Tab by mouth Daily (before breakfast). 1/15/20  Yes Alin Cerrato MD   lamoTRIgine (LAMICTAL) 150 mg tablet Take 1 Tab by mouth daily.  1/8/20  Yes Alin Cerrato MD   doxepin (SINEQUAN) 25 mg capsule TAKE 1 CAPSULE BY MOUTH EVERYDAY AT BEDTIME 1/8/20  Yes Alin Cerrato MD   escitalopram oxalate (LEXAPRO) 20 mg tablet TAKE 2 TABLETS BY MOUTH DAILY 1/8/20  Yes Alin Cerrato MD   metFORMIN (GLUCOPHAGE) 500 mg tablet TAKE 1 TABLET BY MOUTH TWICE A DAY WITH MEALS 1/8/20  Yes Alin Cerrato MD amLODIPine (NORVASC) 5 mg tablet Take 1 Tab by mouth daily. 1/8/20  Yes Yessica Morrow MD   pravastatin (PRAVACHOL) 40 mg tablet TAKE 1 TABLET BY MOUTH EVERY NIGHT  Patient taking differently: Take 40 mg by mouth daily. Has not been taking this medication regularly as it causes joint pain 1/8/20   Yessica Morrow MD   omeprazole (PRILOSEC) 20 mg capsule TAKE 1 CAPSULE BY MOUTH TWICE DAILY 1/8/20   Yessica Morrow MD       Allergies   Allergen Reactions    No Allergy Information Available Other (comments)     unresponsive    No Known Drug Allergies Unknown (comments)     NKDA per pt's        Past Medical History:   Diagnosis Date    Depression     Diabetes ([facility] [address]     Gastrointestinal disorder     gerd    High cholesterol     Obese     Other ill-defined conditions(799.89)     fibromyalgia    Psychiatric disorder     depression     Social History     Tobacco Use    Smoking status: Former Smoker    Smokeless tobacco: Never Used   Substance Use Topics    Alcohol use: No     Alcohol/week: 0.0 standard drinks     Comment: rarely        ROS  Pertinent items noted in HPI    Objective: There were no vitals taken for this visit. General: alert, cooperative, no distress   Mental  status: normal mood, behavior, speech, dress, motor activity, and thought processes, able to follow commands   HENT: NCAT   Neck: no visualized mass   Resp: no respiratory distress   Neuro: no gross deficits   Skin: no discoloration or lesions of concern on visible areas   Psychiatric: normal affect, consistent with stated mood, no evidence of hallucinations       We discussed the expected course, resolution and complications of the diagnosis(es) in detail. Medication risks, benefits, costs, interactions, and alternatives were discussed as indicated. I advised her to contact the office if her condition worsens, changes or fails to improve as anticipated. She expressed understanding with the diagnosis(es) and plan. Manuel Renteria is a 62 y.o. female who was evaluated by a video visit encounter for concerns as above. Patient identification was verified prior to start of the visit. A caregiver was present when appropriate. Due to this being a TeleHealth encounter (During XHDJF-68 public health emergency), evaluation of the following organ systems was limited: Vitals/Constitutional/EENT/Resp/CV/GI//MS/Neuro/Skin/Heme-Lymph-Imm. Pursuant to the emergency declaration under the [facility], [facility] waiver authority and the [employer] Act, this Virtual  Visit was conducted, with patient's (and/or legal guardian's) consent, to reduce the patient's risk of exposure to COVID-19 and provide necessary medical care. Services were provided through a video synchronous discussion virtually to substitute for in-person clinic visit. Patient and provider were located at their individual homes.       Chanelle Ponce MD

## 2020-06-15 NOTE — PROGRESS NOTES
Identified pt with two pt identifiers(name and ).     Chief Complaint   Patient presents with    Abnormal Lab Results     Review labs done on 2020    Thyroid Problem        Health Maintenance Due   Topic    Pneumococcal 0-64 years (1 of 1 - PPSV23)    Eye Exam Retinal or Dilated     Shingrix Vaccine Age 50> (1 of 2)    Breast Cancer Screen Mammogram     Foot Exam Q1     Lipid Screen        Wt Readings from Last 3 Encounters:   20 265 lb (120.2 kg)   02/10/20 267 lb (121.1 kg)   20 248 lb (112.5 kg)     Temp Readings from Last 3 Encounters:   20 99 °F (37.2 °C) (Oral)   02/10/20 98 °F (36.7 °C) (Oral)   20 98.4 °F (36.9 °C) (Oral)     BP Readings from Last 3 Encounters:   20 112/80   02/10/20 116/60   20 124/80     Pulse Readings from Last 3 Encounters:   20 76   02/10/20 78   20 78         Learning Assessment:  :     Learning Assessment 2014   PRIMARY LEARNER Patient   HIGHEST LEVEL OF EDUCATION - PRIMARY LEARNER  DID NOT GRADUATE HIGH SCHOOL   BARRIERS PRIMARY LEARNER NONE   CO-LEARNER CAREGIVER No   PRIMARY LANGUAGE ENGLISH   LEARNER PREFERENCE PRIMARY OTHER (COMMENT)   ANSWERED BY patient   RELATIONSHIP SELF       Depression Screening:  :     3 most recent PHQ Screens 2020   PHQ Not Done -   Little interest or pleasure in doing things Nearly every day   Feeling down, depressed, irritable, or hopeless Nearly every day   Total Score PHQ 2 6   Trouble falling or staying asleep, or sleeping too much Nearly every day   Feeling tired or having little energy Nearly every day   Poor appetite, weight loss, or overeating Nearly every day   Feeling bad about yourself - or that you are a failure or have let yourself or your family down Nearly every day   Trouble concentrating on things such as school, work, reading, or watching TV Nearly every day   Moving or speaking so slowly that other people could have noticed; or the opposite being so fidgety that others notice Nearly every day   Thoughts of being better off dead, or hurting yourself in some way Several days   PHQ 9 Score 25   How difficult have these problems made it for you to do your work, take care of your home and get along with others Very difficult       Fall Risk Assessment:  :     Fall Risk Assessment, last 12 mths 10/11/2017   Able to walk? Yes   Fall in past 12 months? No       Abuse Screening:  :     Abuse Screening Questionnaire 1/8/2020 10/1/2018 10/11/2017 10/10/2016 5/13/2015   Do you ever feel afraid of your partner? N N N N N   Are you in a relationship with someone who physically or mentally threatens you? N N N N N   Is it safe for you to go home? Y Y Y Y Y       Coordination of Care Questionnaire:  :     1) Have you been to an emergency room, urgent care clinic since your last visit? no   Hospitalized since your last visit? no             2) Have you seen or consulted any other health care providers outside of [address] [place] [address] since your last visit? no  (Include any pap smears or colon screenings in this section.)    3) Do you have an Advance Directive on file? no  Are you interested in receiving information about Advance Directives? no    Reviewed record in preparation for visit and have obtained necessary documentation. Medication reconciliation up to date and corrected with patient at this time.

## 2020-06-18 ENCOUNTER — HOSPITAL ENCOUNTER (EMERGENCY)
Age: 65
Discharge: HOME | End: 2020-06-18
Attending: EMERGENCY MEDICINE
Payer: COMMERCIAL

## 2020-06-18 ENCOUNTER — APPOINTMENT (OUTPATIENT)
Dept: GENERAL RADIOLOGY | Age: 65
End: 2020-06-18
Attending: EMERGENCY MEDICINE
Payer: COMMERCIAL

## 2020-06-18 VITALS
DIASTOLIC BLOOD PRESSURE: 74 MMHG | HEIGHT: 66 IN | HEART RATE: 85 BPM | TEMPERATURE: 98.6 F | BODY MASS INDEX: 41.78 KG/M2 | RESPIRATION RATE: 21 BRPM | OXYGEN SATURATION: 95 % | WEIGHT: 260 LBS | SYSTOLIC BLOOD PRESSURE: 129 MMHG

## 2020-06-18 DIAGNOSIS — R07.9 CHEST PAIN, UNSPECIFIED TYPE: Primary | ICD-10-CM

## 2020-06-18 LAB
ALBUMIN SERPL-MCNC: 3.6 G/DL (ref 3.5–5)
ALBUMIN/GLOB SERPL: 0.9 {RATIO} (ref 1.1–2.2)
ALP SERPL-CCNC: 86 U/L (ref 45–117)
ALT SERPL-CCNC: 45 U/L (ref 12–78)
ANION GAP SERPL CALC-SCNC: 7 MMOL/L (ref 5–15)
AST SERPL-CCNC: 36 U/L (ref 15–37)
BASOPHILS # BLD: 0 K/UL (ref 0–0.1)
BASOPHILS NFR BLD: 0 % (ref 0–1)
BILIRUB SERPL-MCNC: 0.5 MG/DL (ref 0.2–1)
BNP SERPL-MCNC: 23 PG/ML
BUN SERPL-MCNC: 10 MG/DL (ref 6–20)
BUN/CREAT SERPL: 10 (ref 12–20)
CALCIUM SERPL-MCNC: 9.1 MG/DL (ref 8.5–10.1)
CHLORIDE SERPL-SCNC: 108 MMOL/L (ref 97–108)
CO2 SERPL-SCNC: 26 MMOL/L (ref 21–32)
COMMENT, HOLDF: NORMAL
CREAT SERPL-MCNC: 0.99 MG/DL (ref 0.55–1.02)
D DIMER PPP FEU-MCNC: 0.41 MG/L FEU (ref 0–0.65)
DIFFERENTIAL METHOD BLD: NORMAL
EOSINOPHIL # BLD: 0.1 K/UL (ref 0–0.4)
EOSINOPHIL NFR BLD: 2 % (ref 0–7)
ERYTHROCYTE [DISTWIDTH] IN BLOOD BY AUTOMATED COUNT: 12.8 % (ref 11.5–14.5)
GLOBULIN SER CALC-MCNC: 3.9 G/DL (ref 2–4)
GLUCOSE SERPL-MCNC: 142 MG/DL (ref 65–100)
HCT VFR BLD AUTO: 42.9 % (ref 35–47)
HGB BLD-MCNC: 13.7 G/DL (ref 11.5–16)
IMM GRANULOCYTES # BLD AUTO: 0 K/UL (ref 0–0.04)
IMM GRANULOCYTES NFR BLD AUTO: 0 % (ref 0–0.5)
LYMPHOCYTES # BLD: 1.3 K/UL (ref 0.8–3.5)
LYMPHOCYTES NFR BLD: 20 % (ref 12–49)
MCH RBC QN AUTO: 28.4 PG (ref 26–34)
MCHC RBC AUTO-ENTMCNC: 31.9 G/DL (ref 30–36.5)
MCV RBC AUTO: 89 FL (ref 80–99)
MONOCYTES # BLD: 0.3 K/UL (ref 0–1)
MONOCYTES NFR BLD: 5 % (ref 5–13)
NEUTS SEG # BLD: 4.9 K/UL (ref 1.8–8)
NEUTS SEG NFR BLD: 73 % (ref 32–75)
NRBC # BLD: 0 K/UL (ref 0–0.01)
NRBC BLD-RTO: 0 PER 100 WBC
PLATELET # BLD AUTO: 244 K/UL (ref 150–400)
PMV BLD AUTO: 9.3 FL (ref 8.9–12.9)
POTASSIUM SERPL-SCNC: 3.7 MMOL/L (ref 3.5–5.1)
PROT SERPL-MCNC: 7.5 G/DL (ref 6.4–8.2)
RBC # BLD AUTO: 4.82 M/UL (ref 3.8–5.2)
SAMPLES BEING HELD,HOLD: NORMAL
SODIUM SERPL-SCNC: 141 MMOL/L (ref 136–145)
TROPONIN I SERPL-MCNC: <0.05 NG/ML
WBC # BLD AUTO: 6.7 K/UL (ref 3.6–11)

## 2020-06-18 PROCEDURE — 36415 COLL VENOUS BLD VENIPUNCTURE: CPT

## 2020-06-18 PROCEDURE — 93005 ELECTROCARDIOGRAM TRACING: CPT

## 2020-06-18 PROCEDURE — 99284 EMERGENCY DEPT VISIT MOD MDM: CPT

## 2020-06-18 PROCEDURE — 71046 X-RAY EXAM CHEST 2 VIEWS: CPT

## 2020-06-18 PROCEDURE — 84484 ASSAY OF TROPONIN QUANT: CPT

## 2020-06-18 PROCEDURE — 85025 COMPLETE CBC W/AUTO DIFF WBC: CPT

## 2020-06-18 PROCEDURE — 85379 FIBRIN DEGRADATION QUANT: CPT

## 2020-06-18 PROCEDURE — 80053 COMPREHEN METABOLIC PANEL: CPT

## 2020-06-18 PROCEDURE — 83880 ASSAY OF NATRIURETIC PEPTIDE: CPT

## 2020-06-18 NOTE — ED TRIAGE NOTES
Patient reports shortness of breath over the past couple months and dizziness with nausea starting today and chest pain yesterday that has resolved.

## 2020-06-18 NOTE — DISCHARGE INSTRUCTIONS
Patient Education        Chest Pain: Care Instructions  Your Care Instructions     There are many things that can cause chest pain. Some are not serious and will get better on their own in a few days. But some kinds of chest pain need more testing and treatment. Your doctor may have recommended a follow-up visit in the next 8 to 12 hours. If you are not getting better, you may need more tests or treatment. Even though your doctor has released you, you still need to watch for any problems. The doctor carefully checked you, but sometimes problems can develop later. If you have new symptoms or if your symptoms do not get better, get medical care right away. If you have worse or different chest pain or pressure that lasts more than 5 minutes or you passed out (lost consciousness), [id number] or seek other emergency help right away. A medical visit is only one step in your treatment. Even if you feel better, you still need to do what your doctor recommends, such as going to all suggested follow-up appointments and taking medicines exactly as directed. This will help you recover and help prevent future problems. How can you care for yourself at home? · Rest until you feel better. · Take your medicine exactly as prescribed. Call your doctor if you think you are having a problem with your medicine. · Do not drive after taking a prescription pain medicine. When should you call for help? [id number]:   · You passed out (lost consciousness). · You have severe difficulty breathing. · You have symptoms of a heart attack. These may include:  ? Chest pain or pressure, or a strange feeling in your chest.  ? Sweating. ? Shortness of breath. ? Nausea or vomiting. ? Pain, pressure, or a strange feeling in your back, neck, jaw, or upper belly or in one or both shoulders or arms. ? Lightheadedness or sudden weakness. ? A fast or irregular heartbeat.   After you call 911, the  may tell you to chew 1 adult-strength or 2 to 4 low-dose aspirin. Wait for an ambulance. Do not try to drive yourself. Call your doctor today if:   · You have any trouble breathing. · Your chest pain gets worse. · You are dizzy or lightheaded, or you feel like you may faint. · You are not getting better as expected. · You are having new or different chest pain. Where can you learn more? Go to http://kelli-carl.info/  Enter A120 in the search box to learn more about \"Chest Pain: Care Instructions. \"  Current as of: June 26, 2019               Content Version: 12.5  © 9813-6522 [employer], [employer]. Care instructions adapted under license by [employer]. (which disclaims liability or warranty for this information). If you have questions about a medical condition or this instruction, always ask your healthcare professional. [address] any warranty or liability for your use of this information.

## 2020-06-18 NOTE — ED PROVIDER NOTES
HPI the patient complains of left-sided chest pain that occurred yesterday and lasted most of the day. The pain radiated at times into the left arm. Today she has had occasional pain in her chest but she had an episode of shortness of breath earlier today that prompted her to come in. She admits to having shortness of breath for 2 months but today's episode was worse. She denies exertional symptoms, cough, fever, vomiting or other complaints.     Past Medical History:   Diagnosis Date    Depression     Diabetes ([address]     Gastrointestinal disorder     gerd    High cholesterol     Obese     Other ill-defined conditions(799.89)     fibromyalgia    Psychiatric disorder     depression       Past Surgical History:   Procedure Laterality Date    DELIVERY       X 2    HX GYN      hysterectomy    HX SEPTOPLASTY           Family History:   Problem Relation Age of Onset    Hypertension Mother     Heart Disease Father     Cancer Brother        Social History     Socioeconomic History    Marital status:      Spouse name: Not on file    Number of children: Not on file    Years of education: Not on file    Highest education level: Not on file   Occupational History    Not on file   Social Needs    Financial resource strain: Not on file    Food insecurity     Worry: Not on file     Inability: Not on file    Transportation needs     Medical: Not on file     Non-medical: Not on file   Tobacco Use    Smoking status: Former Smoker    Smokeless tobacco: Never Used   Substance and Sexual Activity    Alcohol use: No     Alcohol/week: 0.0 standard drinks     Comment: rarely     Drug use: No    Sexual activity: Not Currently     Birth control/protection: None   Lifestyle    Physical activity     Days per week: Not on file     Minutes per session: Not on file    Stress: Not on file   Relationships    Social connections     Talks on phone: Not on file     Gets together: Not on file     Attends Nondenominational service: Not on file     Active member of club or organization: Not on file     Attends meetings of clubs or organizations: Not on file     Relationship status: Not on file    Intimate partner violence     Fear of current or ex partner: Not on file     Emotionally abused: Not on file     Physically abused: Not on file     Forced sexual activity: Not on file   Other Topics Concern    Not on file   Social History Narrative    Not on file         ALLERGIES: No allergy information available and No known drug allergies    Review of Systems   Constitutional: Negative for fever. HENT: Negative for voice change. Eyes: Negative for pain. Respiratory: Positive for shortness of breath. Negative for cough. Cardiovascular: Positive for chest pain. Gastrointestinal: Negative for abdominal pain, nausea and vomiting. Genitourinary: Negative for flank pain. Musculoskeletal: Negative for back pain. Skin: Negative for rash. Neurological: Negative for headaches. Psychiatric/Behavioral: Negative for confusion. Vitals:    06/18/20 1442   BP: 152/74   Pulse: 88   Resp: 16   Temp: 98.6 °F (37 °C)   SpO2: 99%   Weight: 117.9 kg (260 lb)   Height: 5' 6\" (1.676 m)            Physical Exam  Constitutional:       General: She is not in acute distress. Appearance: She is well-developed. HENT:      Head: Normocephalic. Neck:      Musculoskeletal: Normal range of motion. Cardiovascular:      Rate and Rhythm: Normal rate. Heart sounds: No murmur. Pulmonary:      Effort: Pulmonary effort is normal.      Breath sounds: Normal breath sounds. Abdominal:      Palpations: Abdomen is soft. Tenderness: There is no abdominal tenderness. Musculoskeletal: Normal range of motion. Skin:     General: Skin is warm and dry. Capillary Refill: Capillary refill takes less than 2 seconds. Neurological:      Mental Status: She is alert.    Psychiatric:         Behavior: Behavior normal. MDM       Procedures ED EKG interpretation:  Rhythm: nsr, rate 86. No acute st changes. This EKG was interpreted by Tram Coburn MD,ED Provider. Discussed results with the patient and advised her to follow-up with PCP or cardiology over the next few days for a stress test.  She agrees.

## 2020-06-19 ENCOUNTER — TELEPHONE (OUTPATIENT)
Dept: CARDIOLOGY CLINIC | Age: 65
End: 2020-06-19

## 2020-06-19 ENCOUNTER — PATIENT OUTREACH (OUTPATIENT)
Dept: FAMILY MEDICINE CLINIC | Age: 65
End: 2020-06-19

## 2020-06-19 NOTE — TELEPHONE ENCOUNTER
Patients was seen in the ER yesterday and was advised to call and make an appointment with Dr Pennie Farah, last seen In 2016.      Phone: [phone]

## 2020-06-19 NOTE — PROGRESS NOTES
Patient contacted regarding recent discharge and COVID-19 risk. Discussed COVID-19 related testing which was not done at this time. Test results were not done. Patient informed of results, if available? no    Care Transition Nurse/ Ambulatory Care Manager contacted the patient by telephone to perform post discharge assessment. Verified name and  with patient as identifiers. Patient has following risk factors of: diabetes. CTN/ACM reviewed discharge instructions, medical action plan and red flags related to discharge diagnosis. Reviewed and educated them on any new and changed medications related to discharge diagnosis. Advised obtaining a 90-day supply of all daily and as-needed medications. Education provided regarding infection prevention, and signs and symptoms of COVID-19 and when to seek medical attention with patient who verbalized understanding. Discussed exposure protocols and quarantine from [address] you at higher risk for severe illness  and given an opportunity for questions and concerns. The patient agrees to contact the COVID-19 hotline [phone] or PCP office for questions related to their healthcare. CTN/ACM provided contact information for future reference. From CDC: Are you at higher risk for severe illness?  Wash your hands often.  Avoid close contact (6 feet, which is about two arm lengths) with people who are sick.  Put distance between yourself and other people if COVID-19 is spreading in your community.  Clean and disinfect frequently touched surfaces.  Avoid all cruise travel and non-essential air travel.  Call your healthcare professional if you have concerns about COVID-19 and your underlying condition or if you are sick.     For more information on steps you can take to protect yourself, see CDC's How to Protect Yourself      Patient/family/caregiver given information for Ricki Montanez and agrees to enroll yes  Patient's preferred e-mail: [email or web address]  Patient's preferred phone number: [phone]  Based on Loop alert triggers, patient will be contacted by nurse care manager for worsening symptoms. Pt will be further monitored by COVID Loop Team based on severity of symptoms and risk factors.

## 2020-06-22 LAB
ATRIAL RATE: 86 BPM
CALCULATED P AXIS, ECG09: 50 DEGREES
CALCULATED R AXIS, ECG10: -5 DEGREES
CALCULATED T AXIS, ECG11: 37 DEGREES
DIAGNOSIS, 93000: NORMAL
P-R INTERVAL, ECG05: 140 MS
Q-T INTERVAL, ECG07: 394 MS
QRS DURATION, ECG06: 104 MS
QTC CALCULATION (BEZET), ECG08: 471 MS
VENTRICULAR RATE, ECG03: 86 BPM

## 2020-07-20 ENCOUNTER — TELEPHONE (OUTPATIENT)
Dept: CARDIOLOGY CLINIC | Age: 65
End: 2020-07-20

## 2020-07-20 NOTE — TELEPHONE ENCOUNTER
Pt requesting to schedule an appt with Dr. Delmi Coleman. Dx: severe sob, mild chest pains.  Please advise        MOWLN:[phone]

## 2020-07-22 ENCOUNTER — OFFICE VISIT (OUTPATIENT)
Dept: CARDIOLOGY CLINIC | Age: 65
End: 2020-07-22

## 2020-07-22 VITALS
DIASTOLIC BLOOD PRESSURE: 74 MMHG | HEART RATE: 87 BPM | BODY MASS INDEX: 43.97 KG/M2 | RESPIRATION RATE: 22 BRPM | SYSTOLIC BLOOD PRESSURE: 138 MMHG | OXYGEN SATURATION: 97 % | HEIGHT: 66 IN | WEIGHT: 273.6 LBS

## 2020-07-22 DIAGNOSIS — R07.9 CHEST PAIN, UNSPECIFIED TYPE: ICD-10-CM

## 2020-07-22 DIAGNOSIS — I10 ESSENTIAL HYPERTENSION: ICD-10-CM

## 2020-07-22 DIAGNOSIS — I26.99 PULMONARY EMBOLISM, UNSPECIFIED CHRONICITY, UNSPECIFIED PULMONARY EMBOLISM TYPE, UNSPECIFIED WHETHER ACUTE COR PULMONALE PRESENT (HCC): ICD-10-CM

## 2020-07-22 DIAGNOSIS — R06.02 SOB (SHORTNESS OF BREATH): Primary | ICD-10-CM

## 2020-07-22 NOTE — PROGRESS NOTES
Reason for Consult:       HPI: Arlette Casillas is a 62 y.o. female with past medical history significant for hypertension, dyslipidemia, obesity, diabetes, is here for evaluation of symptoms of shortness of breath and chest pain. Symptoms started few months ago when she started experiencing increasing exertional shortness of breath. Symptoms have been progressive and recently she is so short of breath that even daily activities will cause her significant dyspnea. On June 18, 2020 she experienced some chest pain which was left anterior chest wall with slight left arm numbness therefore she went to the ER for evaluation and EKG was unremarkable as well as troponins were negative. She was asked to follow-up with cardiology. Since then she has not had any chest pain. She continues to have shortness of breath. No significant peripheral edema. She used to smoke tobacco but quit about 15 years ago. She is known to have sleep apnea but has not been using her CPAP machine for last 3 years. She has seen pulmonology probably Dr. Areatha Kanner few years ago. EKG in my office today demonstrated normal sinus rhythm with right bundle branch block with normal axis with nonspecific ST segment changes. Left heart catheterization in December 2015 demonstrated normal coronaries. CBC, CMP personally reviewed. Plan:    1. Dyspnea on exertion: Symptoms suggestive of pulmonary pathology possibly COPD. She will need further pulmonary evaluation and possibly bronchodilators. There is possibility of pulmonary embolism and may require CT scan of the chest.  I will order the CT scan in the interest of time. Check echocardiogram for LV function. Doubt CAD as the cause since she had heart catheterization 4 years ago which demonstrated normal coronaries. 3.  Hypertension: Blood pressure is controlled. Continue amlodipine. 4.  Diabetes: Blood sugar is controlled hemoglobin A1c is 5.8.             ATTENTION:   This medical record was transcribed using an electronic medical records/speech recognition system. Although proofread, it may and can contain electronic, spelling and other errors. Corrections may be executed at a later time. Please feel free to contact us for any clarifications as needed. Past Medical History:   Diagnosis Date    Depression     Diabetes ([address]     Gastrointestinal disorder     gerd    High cholesterol     Obese     Other ill-defined conditions(799.89)     fibromyalgia    Psychiatric disorder     depression    Sleep apnea             Past Surgical History:   Procedure Laterality Date    DELIVERY       X 2    HX GYN      hysterectomy    HX SEPTOPLASTY               Family History   Problem Relation Age of Onset    Hypertension Mother     Heart Disease Father     Cancer Brother            Social History     Socioeconomic History    Marital status:      Spouse name: Not on file    Number of children: Not on file    Years of education: Not on file    Highest education level: Not on file   Occupational History    Not on file   Social Needs    Financial resource strain: Not on file    Food insecurity     Worry: Not on file     Inability: Not on file    Transportation needs     Medical: Not on file     Non-medical: Not on file   Tobacco Use    Smoking status: Former Smoker    Smokeless tobacco: Never Used    Tobacco comment: Quit 16y ago.    Substance and Sexual Activity    Alcohol use: No     Alcohol/week: 0.0 standard drinks     Comment: rarely     Drug use: No    Sexual activity: Not Currently     Birth control/protection: None   Lifestyle    Physical activity     Days per week: Not on file     Minutes per session: Not on file    Stress: Not on file   Relationships    Social connections     Talks on phone: Not on file     Gets together: Not on file     Attends Quaker service: Not on file     Active member of club or organization: Not on file     Attends meetings of clubs or organizations: Not on file     Relationship status: Not on file    Intimate partner violence     Fear of current or ex partner: Not on file     Emotionally abused: Not on file     Physically abused: Not on file     Forced sexual activity: Not on file   Other Topics Concern    Not on file   Social History Narrative    Not on file         Allergies   Allergen Reactions    No Allergy Information Available Other (comments)     unresponsive    No Known Drug Allergies Unknown (comments)     NKDA per pt's             Current Outpatient Medications   Medication Sig Dispense Refill    levothyroxine (SYNTHROID) 175 mcg tablet Take 1 Tab by mouth Daily (before breakfast). 90 Tab 1    lamoTRIgine (LAMICTAL) 150 mg tablet Take 1 Tab by mouth daily. 90 Tab 1    doxepin (SINEQUAN) 25 mg capsule TAKE 1 CAPSULE BY MOUTH EVERYDAY AT BEDTIME 90 Cap 1    escitalopram oxalate (LEXAPRO) 20 mg tablet TAKE 2 TABLETS BY MOUTH DAILY 180 Tab 1    metFORMIN (GLUCOPHAGE) 500 mg tablet TAKE 1 TABLET BY MOUTH TWICE A DAY WITH MEALS 180 Tab 1    omeprazole (PRILOSEC) 20 mg capsule TAKE 1 CAPSULE BY MOUTH TWICE DAILY 60 Cap 5    amLODIPine (NORVASC) 5 mg tablet Take 1 Tab by mouth daily. 90 Tab 1        ROS:  12 point review of systems was performed.  All negative except for HPI     Physical Exam:  Visit Vitals  /74 (BP 1 Location: Right arm, BP Patient Position: Sitting)   Pulse 87   Resp 22   Ht 5' 6\" (1.676 m)   Wt 273 lb 9.6 oz (124.1 kg)   SpO2 97%   BMI 44.16 kg/m²       Gen:  Well-developed, well-nourished, in no acute distress  HEENT:  Pink conjunctivae, PERRL, hearing intact to voice, moist mucous membranes  Neck:  Supple, without masses, thyroid non-tender  Resp:  No accessory muscle use, clear breath sounds without wheezes rales or rhonchi  Card:  No murmurs, normal S1, S2 without thrills, bruits or peripheral edema  Abd:  Soft, non-tender, non-distended, normoactive bowel sounds are present, no palpable organomegaly and no detectable hernias  Lymph:  No cervical or inguinal adenopathy  Musc:  No cyanosis or clubbing  Skin:  No rashes or ulcers, skin turgor is good  Neuro:  Cranial nerves are grossly intact, no focal motor weakness, follows commands appropriately  Psych:  Good insight, oriented to person, place and time, alert     Labs:     Lab Results   Component Value Date/Time    WBC 6.7 06/18/2020 03:53 PM    HGB 13.7 06/18/2020 03:53 PM    Hemoglobin (POC) 13.9 09/04/2011 10:09 AM    HCT 42.9 06/18/2020 03:53 PM    Hematocrit (POC) 41 09/04/2011 10:09 AM    PLATELET 080 88/94/0277 03:53 PM    MCV 89.0 06/18/2020 03:53 PM     Lab Results   Component Value Date/Time    Hemoglobin A1c 5.8 (H) 06/05/2020 08:47 AM    Hemoglobin A1c 5.7 (H) 01/08/2020 12:00 AM    Hemoglobin A1c 5.6 04/15/2019 11:05 AM    Glucose 142 (H) 06/18/2020 03:53 PM    Glucose (POC) 101 (H) 01/21/2016 09:47 AM    Microalb/Creat ratio (ug/mg creat.) 4.1 06/26/2017 08:32 AM    LDL, calculated 123 (H) 04/15/2019 11:05 AM    Creatinine (POC) 1.0 09/04/2011 10:09 AM    Creatinine 0.99 06/18/2020 03:53 PM      Lab Results   Component Value Date/Time    Cholesterol, total 191 04/15/2019 11:05 AM    HDL Cholesterol 32 (L) 04/15/2019 11:05 AM    LDL, calculated 123 (H) 04/15/2019 11:05 AM    Triglyceride 182 (H) 04/15/2019 11:05 AM    CHOL/HDL Ratio 3.8 06/29/2014 01:00 AM     Lab Results   Component Value Date/Time    ALT (SGPT) 45 06/18/2020 03:53 PM    Alk.  phosphatase 86 06/18/2020 03:53 PM    Bilirubin, direct 0.17 04/19/2016 10:55 AM    Bilirubin, total 0.5 06/18/2020 03:53 PM    Albumin 3.6 06/18/2020 03:53 PM    Protein, total 7.5 06/18/2020 03:53 PM    Ammonia 25 09/04/2011 10:20 AM    INR 1.0 04/19/2016 10:55 AM    Prothrombin time 10.6 04/19/2016 10:55 AM    PLATELET 449 32/54/8036 03:53 PM     Lab Results   Component Value Date/Time    INR 1.0 04/19/2016 10:55 AM    INR (POC) 1.0 05/13/2012 10:02 AM    Prothrombin time 10.6 04/19/2016 10:55 AM      Lab Results   Component Value Date/Time    GFR est non-AA 57 (L) 06/18/2020 03:53 PM    GFRNA, POC >60 09/04/2011 10:09 AM    GFR est AA >60 06/18/2020 03:53 PM    GFRAA, POC >60 09/04/2011 10:09 AM    Creatinine 0.99 06/18/2020 03:53 PM    Creatinine (POC) 1.0 09/04/2011 10:09 AM    BUN 10 06/18/2020 03:53 PM    BUN (POC) 5 (L) 09/04/2011 10:09 AM    Sodium 141 06/18/2020 03:53 PM    Sodium (POC) 143 09/04/2011 10:09 AM    Potassium 3.7 06/18/2020 03:53 PM    Potassium (POC) 4.1 09/04/2011 10:09 AM    Chloride 108 06/18/2020 03:53 PM    Chloride (POC) 105 09/04/2011 10:09 AM    CO2 26 06/18/2020 03:53 PM    Magnesium 2.1 09/08/2011 04:00 AM    Phosphorus 3.7 09/05/2011 01:45 AM     No results found for: PSA, PSA2, PSAR1, Samantha Edmond, PSAR3, [id number], [id number], PSALT  Lab Results   Component Value Date/Time    TSH 0.052 (L) 06/05/2020 08:47 AM    T4, Free 1.88 (H) 06/05/2020 08:47 AM      Lab Results   Component Value Date/Time    Glucose 142 (H) 06/18/2020 03:53 PM    Glucose (POC) 101 (H) 01/21/2016 09:47 AM      Lab Results   Component Value Date/Time     05/13/2012 12:07 PM    CK - MB 0.7 05/13/2012 12:07 PM    CK-MB Index 0.6 05/13/2012 12:07 PM    Troponin-I, Qt. <0.05 06/18/2020 03:53 PM      Lab Results   Component Value Date/Time    NT pro-BNP 23 06/18/2020 03:53 PM    NT pro-BNP 19 12/16/2015 03:20 PM      Lab Results   Component Value Date/Time    Sodium 141 06/18/2020 03:53 PM    Potassium 3.7 06/18/2020 03:53 PM    Chloride 108 06/18/2020 03:53 PM    CO2 26 06/18/2020 03:53 PM    Anion gap 7 06/18/2020 03:53 PM    Glucose 142 (H) 06/18/2020 03:53 PM    BUN 10 06/18/2020 03:53 PM    Creatinine 0.99 06/18/2020 03:53 PM    BUN/Creatinine ratio 10 (L) 06/18/2020 03:53 PM    GFR est AA >60 06/18/2020 03:53 PM    GFR est non-AA 57 (L) 06/18/2020 03:53 PM    Calcium 9.1 06/18/2020 03:53 PM      Lab Results   Component Value Date/Time    Sodium 141 06/18/2020 03:53 PM Potassium 3.7 06/18/2020 03:53 PM    Chloride 108 06/18/2020 03:53 PM    CO2 26 06/18/2020 03:53 PM    Anion gap 7 06/18/2020 03:53 PM    Glucose 142 (H) 06/18/2020 03:53 PM    BUN 10 06/18/2020 03:53 PM    Creatinine 0.99 06/18/2020 03:53 PM    BUN/Creatinine ratio 10 (L) 06/18/2020 03:53 PM    GFR est AA >60 06/18/2020 03:53 PM    GFR est non-AA 57 (L) 06/18/2020 03:53 PM    Calcium 9.1 06/18/2020 03:53 PM    Bilirubin, total 0.5 06/18/2020 03:53 PM    ALT (SGPT) 45 06/18/2020 03:53 PM    Alk. phosphatase 86 06/18/2020 03:53 PM    Protein, total 7.5 06/18/2020 03:53 PM    Albumin 3.6 06/18/2020 03:53 PM    Globulin 3.9 06/18/2020 03:53 PM    A-G Ratio 0.9 (L) 06/18/2020 03:53 PM      Lab Results   Component Value Date/Time    Hemoglobin A1c 5.8 (H) 06/05/2020 08:47 AM    Hemoglobin A1c (POC) 5.8 01/28/2015 11:36 AM         No results for input(s): CPK, CKMB, TROIQ in the last 72 hours. No lab exists for component: CKQMB, CPKMB        Problem List:     Problem List  Date Reviewed: 6/15/2020          Codes Class Noted    Obesity, morbid ([facility] ICD-10-CM: E66.01  ICD-9-CM: 278.01  6/28/2018        S/P TOMASA (total abdominal hysterectomy) ICD-10-CM: Z90.710  ICD-9-CM: V88.01  4/19/2016        Type II diabetes mellitus ([facility] ICD-10-CM: E11.9  ICD-9-CM: 250.00  4/19/2016        Essential hypertension ICD-10-CM: I10  ICD-9-CM: 401.9  3/23/2016        Hypercholesterolemia ICD-10-CM: E78.00  ICD-9-CM: 272.0  3/23/2016        ANNA on CPAP ICD-10-CM: G47.33, Z99.89  ICD-9-CM: 327.23, V46.8  7/1/2014        MOFFETT (nonalcoholic steatohepatitis) ICD-10-CM: K75.81  ICD-9-CM: 571.8  6/28/2014        Lung nodule seen on imaging study (Chronic) ICD-10-CM: R91.1  ICD-9-CM: 793.11  6/28/2014    Overview Signed 6/28/2014  7:52 PM by Antonio Gaviria MD     3 mm in left upper lobe. Needs follow up CT in one year given smoking history.              Obesity (Chronic) ICD-10-CM: E66.9  ICD-9-CM: 278.00  9/5/2011        Hypothyroidism (Chronic) ICD-10-CM: E03.9  ICD-9-CM: 244.9  9/4/2011        Depression (Chronic) ICD-10-CM: F32.9  ICD-9-CM: 127  9/4/2011        GERD (Chronic) ICD-10-CM: K21.9  ICD-9-CM: 530.81  9/4/2011        Fibromyalgia (Chronic) ICD-10-CM: M79.7  ICD-9-CM: 729.1  9/4/2011                Jhon Hogan MD, [facility]

## 2020-07-22 NOTE — PROGRESS NOTES
Per VO of Dr. Han Arias:     CT chest with contrast for rule out PE     Echo- Shortness of breath. Consult Dr. [facility].

## 2020-07-22 NOTE — PATIENT INSTRUCTIONS
CT chest with contrast for rule out PE    Echo- Shortness of breath. Consult Dr. [facility]. See Dr. Jayy Chaves in 1 month.

## 2020-07-22 NOTE — PROGRESS NOTES
Landon Hull is a 62 y.o. female    Chief Complaint   Patient presents with    New Patient    Shortness of Breath    Hypertension    Chest Pain (Angina)       Chest pain : Pt states having left sided CP that lasted for about 1-2 hours but it comes and goes. SOB : Pt states she get very SOB with any activity. Dizziness : Pt states she does have some dizziness. Edema : NO  Refills : NO    Visit Vitals  /74 (BP 1 Location: Right arm, BP Patient Position: Sitting)   Pulse 87   Resp 22   Ht 5' 6\" (1.676 m)   Wt 273 lb 9.6 oz (124.1 kg)   SpO2 97%   BMI 44.16 kg/m²       1. Have you been to the ER, urgent care clinic since your last visit? Hospitalized since your last visit? YES, [facility]     2. Have you seen or consulted any other health care providers outside of the [address] [place] [address] since your last visit? Include any pap smears or colon screening.  No

## 2020-07-29 ENCOUNTER — HOSPITAL ENCOUNTER (OUTPATIENT)
Dept: CT IMAGING | Age: 65
Discharge: HOME | End: 2020-07-29
Attending: INTERNAL MEDICINE
Payer: COMMERCIAL

## 2020-07-29 ENCOUNTER — TELEPHONE (OUTPATIENT)
Dept: CARDIOLOGY CLINIC | Age: 65
End: 2020-07-29

## 2020-07-29 DIAGNOSIS — R06.02 SOB (SHORTNESS OF BREATH): ICD-10-CM

## 2020-07-29 DIAGNOSIS — K74.60 HEPATIC CIRRHOSIS, UNSPECIFIED HEPATIC CIRRHOSIS TYPE, UNSPECIFIED WHETHER ASCITES PRESENT (HCC): Primary | ICD-10-CM

## 2020-07-29 DIAGNOSIS — I26.99 PULMONARY EMBOLISM, UNSPECIFIED CHRONICITY, UNSPECIFIED PULMONARY EMBOLISM TYPE, UNSPECIFIED WHETHER ACUTE COR PULMONALE PRESENT (HCC): ICD-10-CM

## 2020-07-29 DIAGNOSIS — R07.9 CHEST PAIN, UNSPECIFIED TYPE: ICD-10-CM

## 2020-07-29 PROCEDURE — 71275 CT ANGIOGRAPHY CHEST: CPT

## 2020-07-29 PROCEDURE — 74011636320 HC RX REV CODE- 636/320: Performed by: RADIOLOGY

## 2020-07-29 RX ADMIN — IOPAMIDOL 100 ML: 755 INJECTION, SOLUTION INTRAVENOUS at 09:03

## 2020-07-29 NOTE — TELEPHONE ENCOUNTER
Per Dr. Han Arias, [facility] refer her to GI Dr. Mara Dominique for cirrhosis of liver seen on CTA chest. \"

## 2020-08-03 ENCOUNTER — TELEPHONE (OUTPATIENT)
Dept: CARDIOLOGY CLINIC | Age: 65
End: 2020-08-03

## 2020-08-03 NOTE — TELEPHONE ENCOUNTER
Pt inquiring if the gastro appt has been made that Dr. Katharine Lr informed her about.  Please advise      MARTÍNEZK:[phone]

## 2020-08-03 NOTE — TELEPHONE ENCOUNTER
Spoke with patient (IDx2) concerning referral to [facility]. Patient was given office number for Dr. Medina Coleman to schedule an appointment to evaluate for liver cirrhosis.

## 2020-08-04 ENCOUNTER — TELEPHONE (OUTPATIENT)
Dept: CARDIOLOGY CLINIC | Age: 65
End: 2020-08-04

## 2020-08-04 NOTE — TELEPHONE ENCOUNTER
Patient would like to speak with you in regards to some paperwork that was to be sent to Dr. Norberto Sapp office. Please advise.     Phone #: [phone]  Thanks 

## 2020-08-05 NOTE — TELEPHONE ENCOUNTER
Paperwork re-faxed. Patient states that she is scheduled to see Dr. Francisca Santoro nurse practitioner. Advised this OK. Reminded of her appointment tomorrow for echo. Pt expressed understanding.

## 2020-08-06 ENCOUNTER — ANCILLARY PROCEDURE (OUTPATIENT)
Dept: CARDIOLOGY CLINIC | Age: 65
End: 2020-08-06
Payer: COMMERCIAL

## 2020-08-06 VITALS
WEIGHT: 273 LBS | DIASTOLIC BLOOD PRESSURE: 60 MMHG | SYSTOLIC BLOOD PRESSURE: 118 MMHG | BODY MASS INDEX: 43.87 KG/M2 | HEIGHT: 66 IN

## 2020-08-06 DIAGNOSIS — R06.02 SOB (SHORTNESS OF BREATH): ICD-10-CM

## 2020-08-06 DIAGNOSIS — I10 ESSENTIAL HYPERTENSION: ICD-10-CM

## 2020-08-06 LAB
ECHO AO ASC DIAM: 2.99 CM
ECHO AO ROOT DIAM: 3.02 CM
ECHO AV AREA PEAK VELOCITY: 2.76 CM2
ECHO AV AREA VTI: 2.66 CM2
ECHO AV AREA/BSA PEAK VELOCITY: 1.2 CM2/M2
ECHO AV AREA/BSA VTI: 1.2 CM2/M2
ECHO AV MEAN GRADIENT: 4.94 MMHG
ECHO AV PEAK GRADIENT: 8.98 MMHG
ECHO AV PEAK VELOCITY: 149.8 CM/S
ECHO AV VTI: 29.68 CM
ECHO LA AREA 4C: 21.79 CM2
ECHO LA MAJOR AXIS: 3.82 CM
ECHO LA MINOR AXIS: 1.67 CM
ECHO LA VOL 2C: 72.87 ML (ref 22–52)
ECHO LA VOL 4C: 67.2 ML (ref 22–52)
ECHO LA VOL BP: 79.78 ML (ref 22–52)
ECHO LA VOL/BSA BIPLANE: 34.95 ML/M2 (ref 16–28)
ECHO LA VOLUME INDEX A2C: 31.92 ML/M2 (ref 16–28)
ECHO LA VOLUME INDEX A4C: 29.44 ML/M2 (ref 16–28)
ECHO LV E' LATERAL VELOCITY: 7.79 CM/S
ECHO LV E' SEPTAL VELOCITY: 8 CM/S
ECHO LV EDV A2C: 128.7 ML
ECHO LV EDV A4C: 147.33 ML
ECHO LV EDV BP: 139.25 ML (ref 56–104)
ECHO LV EDV INDEX A4C: 64.5 ML/M2
ECHO LV EDV INDEX BP: 61 ML/M2
ECHO LV EDV NDEX A2C: 56.4 ML/M2
ECHO LV EJECTION FRACTION A2C: 63 PERCENT
ECHO LV EJECTION FRACTION A4C: 58 PERCENT
ECHO LV EJECTION FRACTION BIPLANE: 60.8 PERCENT (ref 55–100)
ECHO LV ESV A2C: 48.23 ML
ECHO LV ESV A4C: 61.49 ML
ECHO LV ESV BP: 54.62 ML (ref 19–49)
ECHO LV ESV INDEX A2C: 21.1 ML/M2
ECHO LV ESV INDEX A4C: 26.9 ML/M2
ECHO LV ESV INDEX BP: 23.9 ML/M2
ECHO LV INTERNAL DIMENSION DIASTOLIC: 4.91 CM (ref 3.9–5.3)
ECHO LV INTERNAL DIMENSION SYSTOLIC: 3.33 CM
ECHO LV IVSD: 1.02 CM (ref 0.6–0.9)
ECHO LV MASS 2D: 179 G (ref 67–162)
ECHO LV MASS INDEX 2D: 78.4 G/M2 (ref 43–95)
ECHO LV POSTERIOR WALL DIASTOLIC: 1 CM (ref 0.6–0.9)
ECHO LVOT DIAM: 2.21 CM
ECHO LVOT PEAK GRADIENT: 4.68 MMHG
ECHO LVOT PEAK VELOCITY: 108.13 CM/S
ECHO LVOT SV: 79 ML
ECHO LVOT VTI: 20.65 CM
ECHO MV A VELOCITY: 73.72 CM/S
ECHO MV E DECELERATION TIME (DT): 0.17 S
ECHO MV E VELOCITY: 74.44 CM/S
ECHO MV E/A RATIO: 1.01
ECHO MV E/E' LATERAL: 9.56
ECHO MV E/E' RATIO (AVERAGED): 9.43
ECHO MV E/E' SEPTAL: 9.31
ECHO MV MAX VELOCITY: 86.91 CM/S
ECHO MV MEAN GRADIENT: 1.28 MMHG
ECHO MV PEAK GRADIENT: 3.02 MMHG
ECHO MV VTI: 23.67 CM
ECHO RA AREA 4C: 15.8 CM2
ECHO RV INTERNAL DIMENSION: 4.08 CM
ECHO RV TAPSE: 1.7 CM (ref 1.5–2)
ECHO TV REGURGITANT MAX VELOCITY: 249.46 CM/S
ECHO TV REGURGITANT PEAK GRADIENT: 24.89 MMHG

## 2020-08-06 PROCEDURE — 93306 TTE W/DOPPLER COMPLETE: CPT | Performed by: INTERNAL MEDICINE

## 2020-08-08 DIAGNOSIS — E11.9 TYPE 2 DIABETES MELLITUS WITHOUT COMPLICATION, WITHOUT LONG-TERM CURRENT USE OF INSULIN (HCC): ICD-10-CM

## 2020-08-14 ENCOUNTER — TELEPHONE (OUTPATIENT)
Dept: CARDIOLOGY CLINIC | Age: 65
End: 2020-08-14

## 2020-08-14 RX ORDER — METFORMIN HYDROCHLORIDE 500 MG/1
TABLET ORAL
Qty: 180 TAB | Refills: 1 | Status: SHIPPED | OUTPATIENT
Start: 2020-08-14 | End: 2020-12-10

## 2020-08-14 NOTE — PROGRESS NOTES
Normal Echo. Discussed. She had seen Dr. Alyce Lewis in the past for hepatic steatosis therefore will send her to see him. Cancel appt with Dr. Brent Scott. Pulmonary eval is pending. See in 1 year or as needed.  Cancel appt with me on 8/31/2020

## 2020-08-14 NOTE — TELEPHONE ENCOUNTER
Karen-    Please call and schedule patient for 1 year follow up with Dr. Abdiaziz Gonzalez. Thank you!

## 2020-08-20 ENCOUNTER — OFFICE VISIT (OUTPATIENT)
Dept: HEMATOLOGY | Age: 65
End: 2020-08-20
Payer: COMMERCIAL

## 2020-08-20 VITALS
DIASTOLIC BLOOD PRESSURE: 66 MMHG | OXYGEN SATURATION: 97 % | WEIGHT: 271 LBS | RESPIRATION RATE: 17 BRPM | BODY MASS INDEX: 43.55 KG/M2 | HEART RATE: 79 BPM | SYSTOLIC BLOOD PRESSURE: 137 MMHG | HEIGHT: 66 IN | TEMPERATURE: 98.7 F

## 2020-08-20 DIAGNOSIS — K75.81 NASH (NONALCOHOLIC STEATOHEPATITIS): Primary | ICD-10-CM

## 2020-08-20 PROCEDURE — 99204 OFFICE O/P NEW MOD 45 MIN: CPT | Performed by: INTERNAL MEDICINE

## 2020-08-20 NOTE — PROGRESS NOTES
Chief Complaint   Patient presents with   [address] [facility] Patient     Visit Vitals  /66 (BP 1 Location: Left arm, BP Patient Position: Sitting)   Pulse 79   Temp 98.7 °F (37.1 °C) (Temporal)   Resp 17   Ht 5' 6\" (1.676 m)   Wt 271 lb (122.9 kg)   SpO2 97%   BMI 43.74 kg/m²     3 most recent PHQ Screens 1/8/2020   PHQ Not Done -   Little interest or pleasure in doing things Nearly every day   Feeling down, depressed, irritable, or hopeless Nearly every day   Total Score PHQ 2 6   Trouble falling or staying asleep, or sleeping too much Nearly every day   Feeling tired or having little energy Nearly every day   Poor appetite, weight loss, or overeating Nearly every day   Feeling bad about yourself - or that you are a failure or have let yourself or your family down Nearly every day   Trouble concentrating on things such as school, work, reading, or watching TV Nearly every day   Moving or speaking so slowly that other people could have noticed; or the opposite being so fidgety that others notice Nearly every day   Thoughts of being better off dead, or hurting yourself in some way Several days   PHQ 9 Score 25   How difficult have these problems made it for you to do your work, take care of your home and get along with others Very difficult     Abuse Screening Questionnaire 1/8/2020   Do you ever feel afraid of your partner? N   Are you in a relationship with someone who physically or mentally threatens you? N   Is it safe for you to go home?  Y     Learning Assessment 7/1/2014   PRIMARY LEARNER Patient   HIGHEST LEVEL OF EDUCATION - PRIMARY LEARNER  DID NOT GRADUATE HIGH SCHOOL   BARRIERS PRIMARY LEARNER NONE   CO-LEARNER CAREGIVER No   PRIMARY LANGUAGE ENGLISH   LEARNER PREFERENCE PRIMARY OTHER (COMMENT)   ANSWERED BY patient   RELATIONSHIP SELF

## 2020-08-20 NOTE — PROGRESS NOTES
[address] [place] [address], MD, MD Anne Marie Lemon PA-C Alissa Som, [facility]     Sydney Paniagua, [facility]   Eliseo Ramirez P-CONNIE Duarte, [facility]       Flakito Jackson [facility] 136    at [address]    217 [facility], [address] [place] [address], [place] [address].    [phone]    FAX: [address] [place] [address] Avenue    at [address], [address]    [phone]    FAX: [phone]       Patient Care Team:  Christopher Lovelace MD as PCP - General (Family Medicine)  Christopher Lovelace MD as PCP - [facility]  Teri Walker MD (Gastroenterology)  Marylene Meek, MD (Family Medicine)      Problem List  Date Reviewed: 6/15/2020          Codes Class Noted    Obesity, morbid [facility]) ICD-10-CM: E66.01  ICD-9-CM: 278.01  6/28/2018        S/P TOMASA (total abdominal hysterectomy) ICD-10-CM: Z90.710  ICD-9-CM: V88.01  4/19/2016        Type II diabetes mellitus ([facility] ICD-10-CM: E11.9  ICD-9-CM: 250.00  4/19/2016        Essential hypertension ICD-10-CM: I10  ICD-9-CM: 401.9  3/23/2016        Hypercholesterolemia ICD-10-CM: E78.00  ICD-9-CM: 272.0  3/23/2016        ANNA on CPAP ICD-10-CM: G47.33, Z99.89  ICD-9-CM: 327.23, V46.8  7/1/2014        MOFFETT (nonalcoholic steatohepatitis) ICD-10-CM: K75.81  ICD-9-CM: 571.8  6/28/2014        Lung nodule seen on imaging study (Chronic) ICD-10-CM: R91.1  ICD-9-CM: 793.11  6/28/2014    Overview Signed 6/28/2014  7:52 PM by Victoria Gan MD     3 mm in left upper lobe. Needs follow up CT in one year given smoking history.              Obesity (Chronic) ICD-10-CM: E66.9  ICD-9-CM: 278.00  9/5/2011        Hypothyroidism (Chronic) ICD-10-CM: E03.9  ICD-9-CM: 244.9  9/4/2011        Depression (Chronic) ICD-10-CM: F32.9  ICD-9-CM: 363  9/4/2011        GERD (Chronic) ICD-10-CM: K21.9  ICD-9-CM: 530.81  9/4/2011        Fibromyalgia (Chronic) ICD-10-CM: M79.7  ICD-9-CM: 729.1  9/4/2011              Landon Hull returns to the [address] for management of non-alcoholic steatohepatitis (MOFFETT). The active problem list, all pertinent past medical history, medications, liver histology, radiologic findings and laboratory findings related to the liver disorder were reviewed with the patient. The patient is a 60 y.o.  female who was found to have MOFFETT on liver biopsy in 5/2016. This is the first appointment at [place] [address] since 7/2016. Her body weight hs not changed significantly in the past 4 years. The patient notes fatigue, arthralgias, myalgias,     The patient has limitations in functional activities secondary to these symptoms. The patient has not experienced problems concentrating, swelling of the abdomen, swelling of the lower extremities, hematemesis, hematochezia. ASSESSMENT AND PLAN:  MOFFETT  The diagnosis is based upon liver biopsy, features of metabolic syndrome, serologic studies that are negative for other causes of chronic liver disease,   A liver biopsy performed in 5/2016 shows MOFFETT with stage 2 pericellular fibrosis. Liver transaminases are normal.  ALP is normal.  Liver function is normal.  The platelet count is normal.      If the patient looses 20% of current body weight, which is 54 pounds, down to a weight of of 210 pounds, all steatosis will have resolved. Once all steatosis has resolved all inflammation will resolve. Then all fibrosis will gradually resolve and the liver could eventually be normal.    The patient has been unable to loose any weight in the past 4 years since she was last seen. The patient was offered participation in a clinical trial for treatment of MOFFETT.     The patient would like to participate in a clinical trial.    Counseling for diet and weight loss in patients with confirmed or suspected NAFLD  The patient was counseled regarding diet and exercise to achieve weight loss. The best diet for patients with fatty liver is one very low in carbohydrates and enriched with protein such as an [employer] program.      The patient was told not to consume any food products and drinks containing fructose as this enhances hepatic fat synthesis. There is no medication or vitamin supplements that we advocate for MOFFETT. Using glitazones in patients without diabetes mellitus has been shown to reduce fat content in the liver but has no effect on fibrosis and is associated with weight gain. Vitamin E has also been used but the data is not very good and most experts no longer advocate this. Treatment of other medical problems in patients with chronic liver disease  There are no contraindications for the patient to take most medications that are necessary for treatment of other medical issues. The patient does not comsume alcohol on a daily basis. Normal doses of acetaminophen, as recommended on the label of the bottle, are not hepatotoxic except in the setting of daily alcohol use, even in patients with cirrhosis and can be utilized for pain. Counseling for alcohol in patients with chronic liver disease  The patient was counseled regarding alcohol consumption and the effect of alcohol on chronic liver disease. The patient does not consume any significant amount of alcohol. Vaccinations   Vaccination for viral hepatitis A and B is recommended since the patient has no serologic evidence of previous exposure or vaccination with immunity. Routine vaccinations against other bacterial and viral agents can be performed as indicated. Annual flu vaccination should be administered if indicated.       ALLERGIES  Allergies   Allergen Reactions    No Allergy Information Available Other (comments)     unresponsive    No Known Drug Allergies Unknown (comments)     NKDA per pt's        MEDICATIONS  Current Outpatient Medications   Medication Sig    metFORMIN (GLUCOPHAGE) 500 mg tablet TAKE 1 TABLET BY MOUTH TWICE A DAY WITH MEALS    levothyroxine (SYNTHROID) 175 mcg tablet Take 1 Tab by mouth Daily (before breakfast).  lamoTRIgine (LAMICTAL) 150 mg tablet Take 1 Tab by mouth daily.  doxepin (SINEQUAN) 25 mg capsule TAKE 1 CAPSULE BY MOUTH EVERYDAY AT BEDTIME    escitalopram oxalate (LEXAPRO) 20 mg tablet TAKE 2 TABLETS BY MOUTH DAILY    omeprazole (PRILOSEC) 20 mg capsule TAKE 1 CAPSULE BY MOUTH TWICE DAILY    amLODIPine (NORVASC) 5 mg tablet Take 1 Tab by mouth daily. No current facility-administered medications for this visit. SYSTEM REVIEW NOT RELATED TO LIVER DISEASE OR REVIEWED ABOVE:  Constitution systems: Negative for fever, chills, weight gain, weight loss. Eyes: Negative for visual changes. ENT: Negative for sore throat, painful swallowing. Respiratory: SOB. Cardiology: Chest pains. Negative cardiac catherization. GI:  Negative for constipation or diarrhea. : Negative for urinary frequency, dysuria, hematuria, nocturia. Skin: Negative for rash. Hematology: Negative for easy bruising, blood clots. Musculo-skelatal: Negative for back pain, muscle pain, weakness. Neurologic: Negative for headaches, dizziness, vertigo, memory problems not related to HE. Psychology: Negative for anxiety, depression. FAMILY HISTORY:  The father  of heart disease. The mother  at age 80s. There is no family history of liver disease. SOCIAL HISTORY:  The patient is . The patient has 3 children, and 2 grandchildren. The patient stopped using tobacco products in . The patient has never consumed significant amounts of alcohol. The patient used to work  for [employer]. The patient has not worked since .         PHYSICAL EXAMINATION:  Visit Vitals  /66 (BP 1 Location: Left arm, BP Patient Position: Sitting)   Pulse 79   Temp 98.7 °F (37.1 °C) (Temporal)   Resp 17   Ht 5' 6\" (1.676 m)   Wt 271 lb (122.9 kg)   SpO2 97%   BMI 43.74 kg/m²     General: No acute distress. Eyes: Sclera anicteric. ENT: No oral lesions. Thyroid normal.  Nodes: No adenopathy. Skin: No spider angiomata. No jaundice. No palmar erythema. Respiratory: Lungs clear to auscultation. Cardiovascular: Regular heart rate. No murmurs. No JVD. Abdomen: Soft non-tender. Liver size normal to percussion/palpation. Spleen not palpable. No obvious ascites. Extremities: No edema. No muscle wasting. No gross arthritic changes. Neurologic: Alert and oriented. Cranial nerves grossly intact. No asterixis. LABORATORY STUDIES:  [facility] of [address] [place] [address] & Units 6/18/2020   WBC 3.6 - 11.0 K/uL 6.7   ANC 1.8 - 8.0 K/UL 4.9   HGB 11.5 - 16.0 g/dL 13.7    - 400 K/uL 244   AST 15 - 37 U/L 36   ALT 12 - 78 U/L 45   Alk Phos 45 - 117 U/L 86   Bili, Total 0.2 - 1.0 MG/DL 0.5   Albumin 3.5 - 5.0 g/dL 3.6   BUN 6 - 20 MG/DL 10   Creat 0.55 - 1.02 MG/DL 0.99   Na 136 - 145 mmol/L 141   K 3.5 - 5.1 mmol/L 3.7   Cl 97 - 108 mmol/L 108   CO2 21 - 32 mmol/L 26   Glucose 65 - 100 mg/dL 142 (H)     SEROLOGIES:  Serologies Latest Ref Rn 4/19/2016 11/19/2015   Hep A Ab, Total Negative Negative    Hep B Surface Ag Negative Negative    Hep B Core Ab, Total Negative Negative    Hep B Surface AB QL  Non Reactive    Hep C Ab 0.0 - 0.9 s/co ratio  <0.1   Ferritin 15 - 150 ng/mL 23    Iron % Saturation 15 - 55 % 18    ROBBY, IFA  Negative    ASMCA 0 - 19 Units 25 (H)    Ceruloplasmin 19.0 - 39.0 mg/dL 26.9    Alpha-1 antitrypsin level 90 - 200 mg/dL 181      LIVER HISTOLOGY:  5/2016. Slides reviewed by MLS. MOFFETT. 75-90% macro and micovesicular steatosis. Mild ballooning. Moderate inflammation. pericellular fibrosis.     ENDOSCOPIC PROCEDURES:  Not available or performed    RADIOLOGY:  3/2016. Ultrasound of liver. Echogenic consistent with fatty liver. No liver mass lesions. No dilated bile ducts. No ascites. OTHER TESTING:  Not available or performed    FOLLOW-UP:  All of the issues listed above in the Assessment and Plan were discussed with the patient. All questions were answered. The patient expressed a clear understanding of the above. [address] [place] [address] in 4 weeks for screening and enrollment into a clinical trial for treatment of MOFFETT.   The patient was given a follow-up appointment for 6 months in case she decides not to enter or is excluded from entering the clinical trial.      Latoya Dillon MD  [facility] of [address], [address] [facility] 22.  [address] [place] [address]

## 2020-08-20 NOTE — Clinical Note
9/5/20 Patient: Landon Hull YOB: 1955 Date of Visit: 8/20/2020 Cris Morgan MD 
[address] [facility] D [facility] 860 [id number] VIA In Basket Kourtney Lewis MD 
1555 [facility] 600 [facility] 99 [address] VIA In Basket Dear MD Kourtney Catalan MD, Thank you for referring Ms. Benja Hoover to [address] for evaluation. My notes for this consultation are attached. If you have questions, please do not hesitate to call me. I look forward to following your patient along with you. Sincerely, Wandalee Phalen, MD 
 
 no

## 2020-08-22 DIAGNOSIS — F34.1 DYSTHYMIA: ICD-10-CM

## 2020-08-24 RX ORDER — LAMOTRIGINE 150 MG/1
TABLET ORAL
Qty: 90 TAB | Refills: 1 | Status: SHIPPED | OUTPATIENT
Start: 2020-08-24 | End: 2020-12-10

## 2020-08-24 RX ORDER — DOXEPIN HYDROCHLORIDE 25 MG/1
CAPSULE ORAL
Qty: 90 CAP | Refills: 1 | Status: SHIPPED | OUTPATIENT
Start: 2020-08-24 | End: 2021-05-04

## 2020-09-09 DIAGNOSIS — F34.1 DYSTHYMIA: ICD-10-CM

## 2020-09-09 RX ORDER — ESCITALOPRAM OXALATE 20 MG/1
TABLET ORAL
Qty: 180 TAB | Refills: 1 | Status: SHIPPED | OUTPATIENT
Start: 2020-09-09 | End: 2020-12-10

## 2020-09-09 RX ORDER — LEVOTHYROXINE SODIUM 200 UG/1
TABLET ORAL
Qty: 90 TAB | Refills: 1 | OUTPATIENT
Start: 2020-09-09

## 2020-10-09 DIAGNOSIS — I10 ESSENTIAL HYPERTENSION: ICD-10-CM

## 2020-10-09 RX ORDER — AMLODIPINE BESYLATE 5 MG/1
TABLET ORAL
Qty: 90 TAB | Refills: 1 | Status: SHIPPED | OUTPATIENT
Start: 2020-10-09 | End: 2020-12-10

## 2020-10-20 ENCOUNTER — HOSPITAL ENCOUNTER (OUTPATIENT)
Dept: LAB | Age: 65
Discharge: HOME | End: 2020-10-20

## 2020-10-20 ENCOUNTER — OFFICE VISIT (OUTPATIENT)
Dept: HEMATOLOGY | Age: 65
End: 2020-10-20
Payer: COMMERCIAL

## 2020-10-20 VITALS
OXYGEN SATURATION: 98 % | RESPIRATION RATE: 18 BRPM | SYSTOLIC BLOOD PRESSURE: 137 MMHG | TEMPERATURE: 97.3 F | DIASTOLIC BLOOD PRESSURE: 54 MMHG | WEIGHT: 274.6 LBS | BODY MASS INDEX: 44.13 KG/M2 | HEART RATE: 89 BPM | HEIGHT: 66 IN

## 2020-10-20 DIAGNOSIS — K75.81 NASH (NONALCOHOLIC STEATOHEPATITIS): Primary | ICD-10-CM

## 2020-10-20 DIAGNOSIS — E03.9 ACQUIRED HYPOTHYROIDISM: ICD-10-CM

## 2020-10-20 PROCEDURE — 91200 LIVER ELASTOGRAPHY: CPT | Performed by: PHYSICIAN ASSISTANT

## 2020-10-20 PROCEDURE — 99214 OFFICE O/P EST MOD 30 MIN: CPT | Performed by: PHYSICIAN ASSISTANT

## 2020-10-20 RX ORDER — SOMATROPIN 5 MG/1.5ML
0.6 INJECTION, SOLUTION SUBCUTANEOUS
COMMUNITY
End: 2022-03-18 | Stop reason: ALTCHOICE

## 2020-10-20 RX ORDER — LAMOTRIGINE 150 MG/1
TABLET ORAL
COMMUNITY
End: 2020-12-10

## 2020-10-20 NOTE — PROGRESS NOTES
Identified pt with two pt identifiers(name and ). Reviewed record in preparation for visit and have obtained necessary documentation. Chief Complaint   Patient presents with    Other     MOFFETT    Other     Fibroscan      Vitals:    10/20/20 0856   BP: (!) 137/54   Pulse: 89   Resp: 18   Temp: 97.3 °F (36.3 °C)   TempSrc: Temporal   SpO2: 98%   Weight: 274 lb 9.6 oz (124.6 kg)   Height: 5' 6\" (1.676 m)   PainSc:   2   PainLoc: Flank       Health Maintenance Review: Patient reminded of \"due or due soon\" health maintenance. I have asked the patient to contact his/her primary care provider (PCP) for follow-up on his/her health maintenance. Coordination of Care Questionnaire:  :   1) Have you been to an emergency room, urgent care, or hospitalized since your last visit? If yes, where when, and reason for visit? NO      2. Have seen or consulted any other health care provider since your last visit? If yes, where when, and reason for visit? NO      Patient is accompanied by self I have received verbal consent from Jama Palacios to discuss any/all medical information while they are present in the room.

## 2020-10-20 NOTE — PROGRESS NOTES
[address] [place] [address], MD, MD Yarelis Salomon PA-C [facility], [facility]     Sydney Paniagua, [facility]   Marilynn Casiano, P-C    Rosa Campbell, [facility]       Flakito Deputado [facility] 136    at [address], [address] [place] [address], [facility] 22.    [phone]    FAX: [address] [place] [address]    at [address], [address], [address]    [phone]    FAX: [phone]       Patient Care Team:  Maximino Christopher MD as PCP - General (Family Medicine)  Maximino Christopher MD as PCP - [facility]  Mo Bacon MD (Gastroenterology)  Brenda Kaur MD (Family Medicine)      Problem List  Date Reviewed: 8/20/2020          Codes Class Noted    Obesity, morbid [facility]) ICD-10-CM: E66.01  ICD-9-CM: 278.01  6/28/2018        S/P TOMASA (total abdominal hysterectomy) ICD-10-CM: Z90.710  ICD-9-CM: V88.01  4/19/2016        Type II diabetes mellitus ([facility] ICD-10-CM: E11.9  ICD-9-CM: 250.00  4/19/2016        Essential hypertension ICD-10-CM: I10  ICD-9-CM: 401.9  3/23/2016        Hypercholesterolemia ICD-10-CM: E78.00  ICD-9-CM: 272.0  3/23/2016        Sleep apnea ICD-10-CM: G47.30  ICD-9-CM: 780.57  7/1/2014        MOFFETT (nonalcoholic steatohepatitis) ICD-10-CM: K75.81  ICD-9-CM: 571.8  6/28/2014        Lung nodule seen on imaging study (Chronic) ICD-10-CM: R91.1  ICD-9-CM: 793.11  6/28/2014    Overview Signed 6/28/2014  7:52 PM by Joy Benitez MD     3 mm in left upper lobe. Needs follow up CT in one year given smoking history.              Obesity (Chronic) ICD-10-CM: E66.9  ICD-9-CM: 278.00  9/5/2011        Hypothyroidism (Chronic) ICD-10-CM: E03.9  ICD-9-CM: 244.9  9/4/2011        Depression (Chronic) ICD-10-CM: F32.9  ICD-9-CM: 912 9/4/2011        GERD (Chronic) ICD-10-CM: K21.9  ICD-9-CM: 530.81  9/4/2011        Fibromyalgia (Chronic) ICD-10-CM: M79.7  ICD-9-CM: 729.1  9/4/2011              Alfredo Patient returns to the [place] [address] for management of non-alcoholic steatohepatitis (MOFFETT). The active problem list, all pertinent past medical history, medications, liver histology, radiologic findings and laboratory findings related to the liver disorder were reviewed with the patient. The patient is a 60 y.o.  female who was found to have MOFFETT on liver biopsy in 5/2016. She had prior evaluation at the [place] [address] in 7/2016 at which point work-up and liver biopsy were indicative of MOFFETT with stage 2 changes and a high value of steatosis, 75-90% macro and micovesicular steatosis. Her body weight has not changed significantly in the past 4 years, up ~15# in this time period. The patient notes fatigue, arthralgias, myalgias. The patient has limitations in functional activities secondary to these symptoms. The patient has not experienced problems concentrating, swelling of the abdomen, swelling of the lower extremities, hematemesis, hematochezia. ASSESSMENT AND PLAN:  MOFFETT  The diagnosis is based upon liver biopsy, features of metabolic syndrome, serologic studies that are negative for other causes of chronic liver disease. A liver biopsy performed in 5/2016 shows MOFFETT with stage 2 pericellular fibrosis. Fibroscan in the office today shows cirrhosis. This may represent progression over the past 4 years or there may be some influence of recent nonfasting state. I have discussed liver biopsy in detail with the patient and she is agreeable, will order in the near future with follow-up Dr Stephanie Stephens to follow.      Liver transaminases are normal.  ALP is normal.  Liver function is normal.  The platelet count is normal.      If the patient loses 20% of current body weight, which is ~50 pounds, down to a weight of of ~220 pounds, all steatosis will likely resolve. Once all steatosis has resolved all inflammation will resolve. Then all fibrosis will gradually resolve and the liver could eventually be normal.    The patient has been unable to lose any weight in the past 4 years since she was last seen. We have discussed means to target weight losses with Mediterranean diet and portion modification. The patient was offered participation in a clinical trial for treatment of MOFFETT. The patient would like to participate in a clinical trial if eligible. Treatment of other medical problems in patients with chronic liver disease  There are no contraindications for the patient to take most medications that are necessary for treatment of other medical issues. The patient does not comsume alcohol on a daily basis. Normal doses of acetaminophen, as recommended on the label of the bottle, are not hepatotoxic except in the setting of daily alcohol use, even in patients with cirrhosis and can be utilized for pain. Counseling for alcohol in patients with chronic liver disease  The patient was counseled regarding alcohol consumption and the effect of alcohol on chronic liver disease. The patient does not consume any significant amount of alcohol. Vaccinations   Vaccination for viral hepatitis A and B is recommended since the patient has no serologic evidence of previous exposure or vaccination with immunity. Routine vaccinations against other bacterial and viral agents can be performed as indicated. Annual flu vaccination should be administered if indicated. ALLERGIES  Allergies   Allergen Reactions    Liraglutide Nausea and Vomiting    No Allergy Information Available Other (comments)     Pt. Is allergic to Liraglutide    No Known Drug Allergies Unknown (comments)     Pt.  Is allergic to Liraglutide       MEDICATIONS  Current Outpatient Medications   Medication Sig    amLODIPine (NORVASC) 5 mg tablet TAKE ONE TABLET BY MOUTH DAILY    escitalopram oxalate (LEXAPRO) 20 mg tablet TAKE TWO TABLETS BY MOUTH DAILY    lamoTRIgine (LaMICtal) 150 mg tablet TAKE ONE TABLET BY MOUTH DAILY    doxepin (SINEquan) 25 mg capsule TAKE ONE CAPSULE BY MOUTH EVERY NIGHT AT BEDTIME    metFORMIN (GLUCOPHAGE) 500 mg tablet TAKE 1 TABLET BY MOUTH TWICE A DAY WITH MEALS    omeprazole (PRILOSEC) 20 mg capsule TAKE 1 CAPSULE BY MOUTH TWICE DAILY    levothyroxine (SYNTHROID) 175 mcg tablet Take 1 Tab by mouth Daily (before breakfast). No current facility-administered medications for this visit. SYSTEM REVIEW NOT RELATED TO LIVER DISEASE OR REVIEWED ABOVE:  Constitution systems: Negative for fever, chills, weight gain, weight loss. Eyes: Negative for visual changes. ENT: Negative for sore throat, painful swallowing. Respiratory: SOB. Cardiology: Chest pains. Negative cardiac catherization. GI:  Negative for constipation or diarrhea. : Negative for urinary frequency, dysuria, hematuria, nocturia. Skin: Negative for rash. Hematology: Negative for easy bruising, blood clots. Musculo-skeletal: Negative for back pain, muscle pain, weakness. Neurologic: Negative for headaches, dizziness, vertigo, memory problems not related to HE. Psychology: Negative for anxiety, depression. FAMILY HISTORY:  The father  of heart disease. The mother  at age 80s. There is no family history of liver disease. SOCIAL HISTORY:  The patient is . The patient has 3 children, and 2 grandchildren. The patient stopped using tobacco products in . The patient has never consumed significant amounts of alcohol. The patient used to work  for [employer]. The patient has not worked since .       PHYSICAL EXAMINATION:  Visit Vitals  BP (!) 137/54 (BP 1 Location: Right arm, BP Patient Position: Sitting)   Pulse 89   Temp 97.3 °F (36.3 °C) (Temporal)   Resp 18   Ht 5' 6\" (1.676 m)   Wt 274 lb 9.6 oz (124.6 kg)   SpO2 98%   BMI 44.32 kg/m²     General: No acute distress. Eyes: Sclera anicteric. ENT: No oral lesions. Thyroid normal.  Nodes: No adenopathy. Skin: No spider angiomata. No jaundice. No palmar erythema. Respiratory: Lungs clear to auscultation. Cardiovascular: Regular heart rate. No murmurs. No JVD. Abdomen: Soft non-tender. Liver size normal to percussion/palpation. Spleen not palpable. No obvious ascites. Extremities: No edema. No muscle wasting. No gross arthritic changes. Neurologic: Alert and oriented. Cranial nerves grossly intact. No asterixis. LABORATORY STUDIES:  [facility] [place] of [address] Units 6/18/2020   WBC 3.6 - 11.0 K/uL 6.7   ANC 1.8 - 8.0 K/UL 4.9   HGB 11.5 - 16.0 g/dL 13.7    - 400 K/uL 244   AST 15 - 37 U/L 36   ALT 12 - 78 U/L 45   Alk Phos 45 - 117 U/L 86   Bili, Total 0.2 - 1.0 MG/DL 0.5   Albumin 3.5 - 5.0 g/dL 3.6   BUN 6 - 20 MG/DL 10   Creat 0.55 - 1.02 MG/DL 0.99   Na 136 - 145 mmol/L 141   K 3.5 - 5.1 mmol/L 3.7   Cl 97 - 108 mmol/L 108   CO2 21 - 32 mmol/L 26   Glucose 65 - 100 mg/dL 142 (H)   Additional lab values drawn at today's office visit are pending at the time of documentation. SEROLOGIES:  Serologies Latest Ref Rng 4/19/2016 11/19/2015   Hep A Ab, Total Negative Negative    Hep B Surface Ag Negative Negative    Hep B Core Ab, Total Negative Negative    Hep B Surface AB QL  Non Reactive    Hep C Ab 0.0 - 0.9 s/co ratio  <0.1   Ferritin 15 - 150 ng/mL 23    Iron % Saturation 15 - 55 % 18    ROBBY, IFA  Negative    ASMCA 0 - 19 Units 25 (H)    Ceruloplasmin 19.0 - 39.0 mg/dL 26.9    Alpha-1 antitrypsin level 90 - 200 mg/dL 181      LIVER HISTOLOGY:  5/2016. Slides reviewed by MLS. MOFFETT. 75-90% macro and micovesicular steatosis. Mild ballooning. Moderate inflammation. pericellular fibrosis. 10/2020. FibroScan performed at [address]. EkPa was 26.4. Suggested fibrosis level is F4. CAP score is 400, this is consistent with steatosis. ENDOSCOPIC PROCEDURES:  Not available or performed    RADIOLOGY:  3/2016. Ultrasound of liver. Echogenic consistent with fatty liver. No liver mass lesions. No dilated bile ducts. No ascites. 12/2016. CT Abd and Pelvis. Status post hysterectomy. No acute abnormality. Hepatic steatosis. OTHER TESTING:  Not available or performed    FOLLOW-UP:  All of the issues listed above in the Assessment and Plan were discussed with the patient. All questions were answered. The patient expressed a clear understanding of the above. [address] [place] [address] within 2 weeks of liver biopsy for review with Dr Lena Pierson and discussion of research study options.      Karen Stevens PA-C  [facility]iver [facility] 59, 81 [facility] 22.  [phone]  [address] [place] [address]

## 2020-10-21 LAB
ALBUMIN SERPL-MCNC: 4.1 G/DL (ref 3.8–4.8)
ALP SERPL-CCNC: 90 IU/L (ref 39–117)
ALT SERPL-CCNC: 38 IU/L (ref 0–32)
AST SERPL-CCNC: 43 IU/L (ref 0–40)
BILIRUB DIRECT SERPL-MCNC: 0.18 MG/DL (ref 0–0.4)
BILIRUB SERPL-MCNC: 0.4 MG/DL (ref 0–1.2)
BUN SERPL-MCNC: 13 MG/DL (ref 8–27)
BUN/CREAT SERPL: 15 (ref 12–28)
CALCIUM SERPL-MCNC: 9.3 MG/DL (ref 8.7–10.3)
CHLORIDE SERPL-SCNC: 103 MMOL/L (ref 96–106)
CO2 SERPL-SCNC: 25 MMOL/L (ref 20–29)
CREAT SERPL-MCNC: 0.85 MG/DL (ref 0.57–1)
ERYTHROCYTE [DISTWIDTH] IN BLOOD BY AUTOMATED COUNT: 12.6 % (ref 11.7–15.4)
GLUCOSE SERPL-MCNC: 151 MG/DL (ref 65–99)
HCT VFR BLD AUTO: 40.2 % (ref 34–46.6)
HGB BLD-MCNC: 13.1 G/DL (ref 11.1–15.9)
INR PPP: 1.1 (ref 0.9–1.2)
MCH RBC QN AUTO: 28.5 PG (ref 26.6–33)
MCHC RBC AUTO-ENTMCNC: 32.6 G/DL (ref 31.5–35.7)
MCV RBC AUTO: 88 FL (ref 79–97)
PLATELET # BLD AUTO: 246 X10E3/UL (ref 150–450)
POTASSIUM SERPL-SCNC: 4.1 MMOL/L (ref 3.5–5.2)
PROT SERPL-MCNC: 6.7 G/DL (ref 6–8.5)
PROTHROMBIN TIME: 11.4 SEC (ref 9.1–12)
RBC # BLD AUTO: 4.59 X10E6/UL (ref 3.77–5.28)
SODIUM SERPL-SCNC: 141 MMOL/L (ref 134–144)
WBC # BLD AUTO: 5.1 X10E3/UL (ref 3.4–10.8)

## 2020-12-10 RX ORDER — LEVOTHYROXINE SODIUM 175 UG/1
175 TABLET ORAL
COMMUNITY
End: 2022-03-18 | Stop reason: DRUGHIGH

## 2020-12-10 RX ORDER — METFORMIN HYDROCHLORIDE 500 MG/1
500 TABLET ORAL
COMMUNITY
End: 2021-03-18 | Stop reason: SDUPTHER

## 2020-12-10 RX ORDER — LAMOTRIGINE 150 MG/1
150 TABLET ORAL
COMMUNITY
End: 2021-05-04

## 2020-12-10 RX ORDER — ESCITALOPRAM OXALATE 20 MG/1
40 TABLET ORAL
COMMUNITY
End: 2021-05-04

## 2020-12-10 RX ORDER — AMLODIPINE BESYLATE 5 MG/1
5 TABLET ORAL
COMMUNITY
End: 2021-03-18 | Stop reason: SDUPTHER

## 2020-12-10 RX ORDER — ACETAMINOPHEN 500 MG
1000 TABLET ORAL AS NEEDED
COMMUNITY
End: 2022-03-18 | Stop reason: ALTCHOICE

## 2020-12-10 RX ORDER — IBUPROFEN 200 MG
400 TABLET ORAL AS NEEDED
COMMUNITY
End: 2022-05-19 | Stop reason: ALTCHOICE

## 2020-12-11 ENCOUNTER — TRANSCRIBE ORDER (OUTPATIENT)
Dept: SCHEDULING | Age: 65
End: 2020-12-11

## 2020-12-11 DIAGNOSIS — K75.81 NASH (NONALCOHOLIC STEATOHEPATITIS): Primary | ICD-10-CM

## 2020-12-13 ENCOUNTER — HOSPITAL ENCOUNTER (OUTPATIENT)
Dept: PREADMISSION TESTING | Age: 65
Discharge: HOME | End: 2020-12-13
Payer: COMMERCIAL

## 2020-12-13 ENCOUNTER — TRANSCRIBE ORDER (OUTPATIENT)
Dept: REGISTRATION | Age: 65
End: 2020-12-13

## 2020-12-13 DIAGNOSIS — Z01.812 PRE-PROCEDURE LAB EXAM: ICD-10-CM

## 2020-12-13 DIAGNOSIS — Z01.812 PRE-PROCEDURE LAB EXAM: Primary | ICD-10-CM

## 2020-12-13 PROCEDURE — 87635 SARS-COV-2 COVID-19 AMP PRB: CPT

## 2020-12-14 LAB — SARS-COV-2, COV2NT: NOT DETECTED

## 2020-12-17 ENCOUNTER — APPOINTMENT (OUTPATIENT)
Dept: ULTRASOUND IMAGING | Age: 65
End: 2020-12-17
Attending: INTERNAL MEDICINE
Payer: COMMERCIAL

## 2020-12-17 ENCOUNTER — HOSPITAL ENCOUNTER (OUTPATIENT)
Age: 65
Setting detail: OUTPATIENT SURGERY
Discharge: HOME | End: 2020-12-17
Attending: INTERNAL MEDICINE | Admitting: INTERNAL MEDICINE
Payer: COMMERCIAL

## 2020-12-17 VITALS
OXYGEN SATURATION: 95 % | RESPIRATION RATE: 16 BRPM | BODY MASS INDEX: 44.03 KG/M2 | WEIGHT: 274 LBS | TEMPERATURE: 98.4 F | SYSTOLIC BLOOD PRESSURE: 148 MMHG | HEART RATE: 72 BPM | DIASTOLIC BLOOD PRESSURE: 69 MMHG | HEIGHT: 66 IN

## 2020-12-17 DIAGNOSIS — K75.81 NASH (NONALCOHOLIC STEATOHEPATITIS): ICD-10-CM

## 2020-12-17 DIAGNOSIS — K76.0 NAFLD (NONALCOHOLIC FATTY LIVER DISEASE): ICD-10-CM

## 2020-12-17 PROCEDURE — 47000 NEEDLE BIOPSY OF LIVER PERQ: CPT | Performed by: INTERNAL MEDICINE

## 2020-12-17 PROCEDURE — 76040000019: Performed by: INTERNAL MEDICINE

## 2020-12-17 PROCEDURE — 88313 SPECIAL STAINS GROUP 2: CPT

## 2020-12-17 PROCEDURE — 76942 ECHO GUIDE FOR BIOPSY: CPT | Performed by: INTERNAL MEDICINE

## 2020-12-17 PROCEDURE — 76942 ECHO GUIDE FOR BIOPSY: CPT

## 2020-12-17 PROCEDURE — 74011250636 HC RX REV CODE- 250/636: Performed by: INTERNAL MEDICINE

## 2020-12-17 PROCEDURE — 88307 TISSUE EXAM BY PATHOLOGIST: CPT

## 2020-12-17 PROCEDURE — 77030013826 HC NDL BIOP MAXCOR BARD -B: Performed by: INTERNAL MEDICINE

## 2020-12-17 PROCEDURE — 77030014115: Performed by: INTERNAL MEDICINE

## 2020-12-17 RX ORDER — HYDROMORPHONE HYDROCHLORIDE 1 MG/ML
1 INJECTION, SOLUTION INTRAMUSCULAR; INTRAVENOUS; SUBCUTANEOUS
Status: DISCONTINUED | OUTPATIENT
Start: 2020-12-17 | End: 2020-12-17 | Stop reason: HOSPADM

## 2020-12-17 RX ORDER — LIDOCAINE HYDROCHLORIDE 10 MG/ML
10 INJECTION INFILTRATION; PERINEURAL ONCE
Status: DISCONTINUED | OUTPATIENT
Start: 2020-12-17 | End: 2020-12-17 | Stop reason: HOSPADM

## 2020-12-17 RX ORDER — SODIUM CHLORIDE 0.9 % (FLUSH) 0.9 %
5-40 SYRINGE (ML) INJECTION AS NEEDED
Status: DISCONTINUED | OUTPATIENT
Start: 2020-12-17 | End: 2020-12-17 | Stop reason: HOSPADM

## 2020-12-17 RX ORDER — SODIUM CHLORIDE 0.9 % (FLUSH) 0.9 %
5-40 SYRINGE (ML) INJECTION EVERY 8 HOURS
Status: DISCONTINUED | OUTPATIENT
Start: 2020-12-17 | End: 2020-12-17 | Stop reason: HOSPADM

## 2020-12-17 RX ORDER — ONDANSETRON 2 MG/ML
4 INJECTION INTRAMUSCULAR; INTRAVENOUS
Status: DISCONTINUED | OUTPATIENT
Start: 2020-12-17 | End: 2020-12-17 | Stop reason: HOSPADM

## 2020-12-17 RX ADMIN — HYDROMORPHONE HYDROCHLORIDE 1 MG: 1 INJECTION, SOLUTION INTRAMUSCULAR; INTRAVENOUS; SUBCUTANEOUS at 11:32

## 2020-12-17 NOTE — H&P
[address] [facility] [place], MD, Ruth Araujo MD, MPH      Theodora Odell, LEXY Barone, [facility]     Sydney Paniagua, [facility]   BRETT Cooley-CONNIE Vega [facility]       Flakito Jackson [facility] [address]    at [address]    [address] [place] [address], [address] [place] [address], Mirta  22.    [phone]    FAX: [address] [place] [address], [address]    [phone]    FAX: [phone]         PRE-PROCEDURE NOTE - LIVER BIOPSY    H and P from last office visit reviewed. Allergies reviewed. Out-patient medication list reviewed. Patient Active Problem List   Diagnosis Code    Hypothyroidism E03.9    Depression F32.9    GERD K21.9    Fibromyalgia M79.7    Obesity E66.9    MOFFETT (nonalcoholic steatohepatitis) K75.81    Lung nodule seen on imaging study R91.1    Sleep apnea G47.30    Essential hypertension I10    Hypercholesterolemia E78.00    S/P TOMASA (total abdominal hysterectomy) Z90.710    Type II diabetes mellitus (HCC) E11.9    Obesity, morbid (HCC) E66.01       Allergies   Allergen Reactions    Liraglutide Nausea and Vomiting    No Allergy Information Available Other (comments)     Pt. Is allergic to Liraglutide    No Known Drug Allergies Unknown (comments)     Pt. Is allergic to Liraglutide    Pravastatin Other (comments)     Joint pain. No current facility-administered medications on file prior to encounter. Current Outpatient Medications on File Prior to Encounter   Medication Sig Dispense Refill    acetaminophen (Tylenol Extra Strength) 500 mg tablet Take 1,000 mg by mouth as needed for Pain.  ibuprofen (MOTRIN) 200 mg tablet Take 400 mg by mouth as needed for Pain.       metFORMIN (GLUCOPHAGE) 500 mg tablet Take 500 mg by mouth nightly.  levothyroxine (Unithroid) 175 mcg tablet Take 175 mcg by mouth Daily (before breakfast).  amLODIPine (NORVASC) 5 mg tablet Take 5 mg by mouth nightly.  escitalopram oxalate (LEXAPRO) 20 mg tablet Take 40 mg by mouth nightly.  lamoTRIgine (LaMICtaL) 150 mg tablet Take 150 mg by mouth nightly.  somatropin (Norditropin FlexPro) 5 mg/1.5 mL (3.3 mg/mL) pnij SET PEN TO DOSE 0.4MG AND INJECT SUBCUTANEOUSLY EVERY DAY      doxepin (SINEquan) 25 mg capsule TAKE ONE CAPSULE BY MOUTH EVERY NIGHT AT BEDTIME 90 Cap 1    omeprazole (PRILOSEC) 20 mg capsule TAKE 1 CAPSULE BY MOUTH TWICE DAILY 60 Cap 5       For liver biopsy to assess NALFD. The risks of the procedure were discussed with the patient. This included bleeding, pain, and puncture of other organs. All questions were answered. The patient wishes to proceed with the procedure. The patient was counseled at length about the risks of dolores Covid-19 in the kalpesh-operative and post-operative states including the recovery window of their procedure. The patient was made aware that dolores Covid-19 after a surgical procedure may worsen their prognosis for recovering from the virus and lend to a higher morbidity and or mortality risk. The patient was given the options of postponing their procedure. All of the risks, benefits, and alternatives were discussed. The patient does  wish to proceed with the procedure. PHYSICAL EXAMINATION:  VS: per nursing note  General: No acute distress. Eyes: Sclera anicteric. ENT: No oral lesions. Thyroid normal.  Nodes: No adenopathy. Skin: No spider angiomata. No jaundice. No palmar erythema. Respiratory: Lungs clear to auscultation. Cardiovascular: Regular heart rate. No murmurs. No JVD. Abdomen: Soft non-tender, liver size normal to percussion/palpation. Spleen not palpable. No obvious ascites. Extremities: No edema. No muscle wasting. No gross arthritic changes. Neurologic: Alert and oriented. Cranial nerves grossly intact. No asterixis. LABS:  Lab Results   Component Value Date/Time    WBC 5.1 10/20/2020 12:00 AM    Hemoglobin (POC) 13.9 09/04/2011 10:09 AM    HGB 13.1 10/20/2020 12:00 AM    Hematocrit (POC) 41 09/04/2011 10:09 AM    HCT 40.2 10/20/2020 12:00 AM    PLATELET 497 15/44/2529 12:00 AM    MCV 88 10/20/2020 12:00 AM     Lab Results   Component Value Date/Time    INR 1.1 10/20/2020 12:00 AM    INR 1.0 04/19/2016 10:55 AM    INR (POC) 1.0 05/13/2012 10:02 AM    Prothrombin time 11.4 10/20/2020 12:00 AM    Prothrombin time 10.6 04/19/2016 10:55 AM       ASSESSMENT AND PLAN:  Liver biopsy under ultrasound guidance.     MD Aniket [facility]  [address] [facility] 22.  [phone]  [facility]7 [address]

## 2020-12-17 NOTE — PROGRESS NOTES
[facility]  1955  [id number]    Situation:  Verbal report received from: Carley Chung  Procedure: Procedure(s):  LIVER BIOPSY  :-    Background:    Preoperative diagnosis: MOFFETT  Postoperative diagnosis: 1)MOFFETT    :  Dr. Ashok Rhodes  Assistant(s): Endoscopy Technician-1: Ingrid Talley  Endoscopy RN-1: Michelle Salas RN    Specimens:   ID Type Source Tests Collected by Time Destination   1 : Liver Biopsy Preservative Liver  Toby Alegre MD 12/17/2020 1102 Pathology     H. Pylori  no    Assessment:    Anesthesia gave intra-procedure sedation and medications, see anesthesia flow sheet yes    Intravenous fluids: NS@ KVO     Vital signs stable     Abdominal assessment: round and soft     Recommendation:  Discharge patient per MD order.     Family or Friend   Permission to share finding with family or friend yes

## 2020-12-17 NOTE — DISCHARGE INSTRUCTIONS
Daria Jenkins MD, Anthony Stevens MD, MPH      LEXY Stout, [facility]-BC     Sydney Paniagua, [facility]-BC   Raman Anastacio, [facility]-C    Jose Angel Amaya, [facility]       Flakito Nelson [address] [place] [address]    at [facility]    [address], [address] [facility]    [facility], [address].    [phone]    FAX: [address] [place] [address] Drive, [address], [address]    [phone]    FAX: [phone]         LIVER BIOPSY [address] [place] [address]  1955  Date: 12/17/2020    DIET:    You may resume your previous diet. ACTIVITIES:  Rest quietly the rest of today. You should not lift any objects more than 20 pounds for the next 2 days. If you work sitting down without strenuous activity you may return to work tomorrow. If you exert yourself or do heavy lifting at work you should take tomorrow off. Do not drive or operate hazardous machinery for 12 hours after you are discharged from this procedure. SPECIAL INSTRUCTIONS:  Do not use any aspirin or non-steroidal (Motin, Advil, Naproxen, etc) pain medications for the next 2 days. You may use extra-strength Tylenol (acetaminophen) if you experience pain or discomfort later today. Restarting blood thinners: If you were taking blood thinners prior to the procedure you can restart these in 2 days. Call the [address] office if you experience any of the following:  Persistent or severe abdominal pain. Persistent or severe abdominal distention. Fever and chills   Nausea and vomiting. New or unusual symptoms. Follow-up care: You should have a follow up appointment with Dr. Peggy Glover to review the results of the liver biopsy results in 2 weeks.   If you do not have an appointment please call the office at the number listed above to schedule this. Other instructions: If you have any problems or questions call the [facility] office at the phone number listed above. DISCHARGE SUMMARY from Nurse: The following personal items collected during your admission are returned to you:   Dental Appliance: Dental Appliances: None  Vision: Visual Aid: Glasses  Hearing Aid:    Jewelry:    Clothing:    Other Valuables:    Valuables sent to safe:            Learning About Coronavirus (COVID-19)  Coronavirus (COVID-19): Overview  What is coronavirus (OBOKD-82)? The coronavirus disease (COVID-19) is caused by a virus. It is an illness that was first found in [place], [place], in December 2019. It has since spread worldwide. The virus can cause fever, cough, and trouble breathing. In severe cases, it can cause pneumonia and make it hard to breathe without help. It can cause death. Coronaviruses are a large group of viruses. They cause the common cold. They also cause more serious illnesses like Middle East respiratory syndrome (MERS) and severe acute respiratory syndrome (SARS). COVID-19 is caused by a novel coronavirus. That means it's a new type that has not been seen in people before. This virus spreads person-to-person through droplets from coughing and sneezing. It can also spread when you are close to someone who is infected. And it can spread when you touch something that has the virus on it, such as a doorknob or a tabletop. What can you do to protect yourself from coronavirus (COVID-19)? The best way to protect yourself from getting sick is to:  · Avoid areas where there is an outbreak. · Avoid contact with people who may be infected. · Wash your hands often with soap or alcohol-based hand sanitizers. · Avoid crowds and try to stay at least 6 feet away from other people. · Wash your hands often, especially after you cough or sneeze.  Use soap and water, and scrub for at least 20 seconds. If soap and water aren't available, use an alcohol-based hand . · Avoid touching your mouth, nose, and eyes. What can you do to avoid spreading the virus to others? To help avoid spreading the virus to others:  · Cover your mouth with a tissue when you cough or sneeze. Then throw the tissue in the trash. · Use a disinfectant to clean things that you touch often. · Stay home if you are sick or have been exposed to the virus. Don't go to school, work, or public areas. And don't use public transportation. · If you are sick:  ? Leave your home only if you need to get medical care. But call the doctor's office first so they know you're coming. And wear a face mask, if you have one.  ? If you have a face mask, wear it whenever you're around other people. It can help stop the spread of the virus when you cough or sneeze. ? Clean and disinfect your home every day. Use household  and disinfectant wipes or sprays. Take special care to clean things that you grab with your hands. These include doorknobs, remote controls, phones, and handles on your refrigerator and microwave. And don't forget countertops, tabletops, bathrooms, and computer keyboards. When to call for help  Call 911 anytime you think you may need emergency care. For example, call if:  · You have severe trouble breathing. (You can't talk at all.)  · You have constant chest pain or pressure. · You are severely dizzy or lightheaded. · You are confused or can't think clearly. · Your face and lips have a blue color. · You pass out (lose consciousness) or are very hard to wake up. Call your doctor now if you develop symptoms such as:  · Shortness of breath. · Fever. · Cough. If you need to get care, call ahead to the doctor's office for instructions before you go. Make sure you wear a face mask, if you have one, to prevent exposing other people to the virus.   Where can you get the latest information? The following health organizations are tracking and studying this virus. Their websites contain the most up-to-date information. Art Shayy also learn what to do if you think you may have been exposed to the virus. · U.S. Centers for Disease Control and Prevention (CDC): The CDC provides updated news about the disease and travel advice. The website also tells you how to prevent the spread of infection. www.cdc.gov  · World Health Organization [facility]): WHO offers information about the virus outbreaks. WHO also has travel advice. www.who.int  Current as of: April 1, 2020               Content Version: 12.4  © 2006-2020 Healthwise[employer] Incorporated. Care instructions adapted under license by your healthcare professional. If you have questions about a medical condition or this instruction, always ask your healthcare professional. [address] any warranty or liability for your use of this information.

## 2020-12-17 NOTE — PROGRESS NOTES
No further bleeding noted at incision site. Liver Biopsy site continued to be assessed with vital signs. No bleeding or swelling noted at biopsy site. Bandaid changed prior to discharge.

## 2020-12-17 NOTE — PROCEDURES
Alexsander Li MD, [address], [place] Karime Carter MD, MPH      LEXY Luong, [facility]-BC     Sydney PERDOMO Arcelia, [facility]   Jaymiehillrenu Marina FNP-C    Wendy Sexton, [facility]       Flakito [facility] [address] [place] [address]    at [address], [facility] Mirta Moore  22.    [phone]    FAX: [address] [place] [address], [address] [place] [address] - [address]    [phone]    FAX: [phone]         LIVER BIOPSY PROCEDURE NOTE    [facility]  1955    INDICATIONS/PRE-OPERATIVE  DIAGNOSIS:  NAFLD    : Rose Mary Landrum MD    SURGICAL ASSISTANT:  None    PROSTHETIC DEVICES, TISSUE GRAFTS, TRANSPLANTED ORGANS:  Not applicable    SEDATION: 1% Lidocaine injection 15 ml    PROCEDURE:  Informed consent to perform the procedure was obtained from the patient. The patient was positioned on the edge of the stretcher lying flat in the supine position. Ultrasound was utilized to image the liver. The diaphragm and any major mass lesion or vascular structures within the liver were identified. An appropriate site for liver biopsy was identified. The distance from the surface of the skin to the liver capsule was 5 cm. This area was prepped with betadine and draped in sterile fashion. The skin was infiltrated with 1% lidocaine. The deeper subcutanous tissues and liver capsule overlying the biopsy site were then infiltrated with 1% lidocaine until appropriate anesthesia was obtained. A small incision was made in the skin so the biopsy devise could be easily inserted. A total of 2 passes with the 16 gauge Bard biopsy devise was then made into the liver.   Core(s) of liver tissue totaling 4 cm in length were obtained and placed into tissue fixative. A band aid was placed over the biopsy site. The patient was then repositioned on the right side and transported to the recovery area on the stretcher for routine monitoring until discharge. The specimen was sent to pathology for processing via the normal transport mechanism. SPECIMEN COLLECTED: Liver    INTERVENTIONS:  None    ESTIMATED BLOOD LOSS: Negligible.      POST-OPERATIVE DIAGNOSIS: Same as Pre-operative Diagnosis      Minnie Flores MD  [facility] 1, 2000 [facility] 22.  [phone]  [address] [place] [address]

## 2020-12-17 NOTE — PROGRESS NOTES
Small amount of blood noted from incision site. Bandaid changed and blood cleaned from patient's right side. Will continue to monitor.

## 2021-01-08 ENCOUNTER — VIRTUAL VISIT (OUTPATIENT)
Dept: HEMATOLOGY | Age: 66
End: 2021-01-08
Payer: COMMERCIAL

## 2021-01-08 DIAGNOSIS — K75.81 NASH (NONALCOHOLIC STEATOHEPATITIS): Primary | ICD-10-CM

## 2021-01-08 PROCEDURE — 99214 OFFICE O/P EST MOD 30 MIN: CPT | Performed by: INTERNAL MEDICINE

## 2021-01-08 NOTE — PROGRESS NOTES
VIRTUAL TELEHEALTH VISIT PERFORMED DUE TO COVID-19 EPIDEMIC    CONSENT:  Simba Kline, who was seen by synchronous, real-time, audio-video technology, and/or her healthcare decision maker, is aware that this patient-initiated, Telehealth encounter on 1/8/2021 is a billable service, with coverage as determined by her insurance carrier. She is aware that she may receive a bill and has provided verbal consent to proceed. This patient was evaluated during a Virtual Telehealth visit. A caregiver was present if appropriate.  Due to this being a TeleHealth encounter performed during the [facility] public health emergency, the physical examination was limited to that listed in the [address] [place] [address], Boogie NOEL, Eddie Amato, MD Adriana Mcleod, PA-CONNIE Walker, ACNP-BC     April S Arcelia, [facility]   Izora Hashimoto, [facility] [address], [facility]       [facility] 136    at [address] 22.    [phone]    FAX: [address] [place] [address], [address]    [phone]    FAX: [phone]       Patient Care Team:  Kaycee Dias MD as PCP - General (Family Medicine)  Kaycee Dias MD as PCP - [facility] Provider  Eugenio Velasquez MD (Gastroenterology)  Cristal Scott MD (Family Medicine)      Problem List  Date Reviewed: 10/20/2020          Codes Class Noted    Obesity, morbid [facility]) ICD-10-CM: E66.01  ICD-9-CM: 278.01  6/28/2018        S/P TOMASA (total abdominal hysterectomy) ICD-10-CM: Z90.710  ICD-9-CM: V88.01  4/19/2016        Type II diabetes mellitus ([facility] ICD-10-CM: E11.9  ICD-9-CM: 250.00  4/19/2016        Essential hypertension ICD-10-CM: X79  ICD-9-CM: 401.9  3/23/2016        Hypercholesterolemia ICD-10-CM: E78.00  ICD-9-CM: 272.0  3/23/2016        Sleep apnea ICD-10-CM: G47.30  ICD-9-CM: 780.57  7/1/2014        LOUIS (nonalcoholic steatohepatitis) ICD-10-CM: K75.81  ICD-9-CM: 571.8  6/28/2014        Lung nodule seen on imaging study (Chronic) ICD-10-CM: R91.1  ICD-9-CM: 793.11  6/28/2014    Overview Signed 6/28/2014  7:52 PM by Henry Jones MD     3 mm in left upper lobe. Needs follow up CT in one year given smoking history. Obesity (Chronic) ICD-10-CM: E66.9  ICD-9-CM: 278.00  9/5/2011        Hypothyroidism (Chronic) ICD-10-CM: E03.9  ICD-9-CM: 244.9  9/4/2011        Depression (Chronic) ICD-10-CM: F32.9  ICD-9-CM: 702  9/4/2011        GERD (Chronic) ICD-10-CM: K21.9  ICD-9-CM: 530.81  9/4/2011        Fibromyalgia (Chronic) ICD-10-CM: M79.7  ICD-9-CM: 729.1  9/4/2011              Joce Del Cid returns to the [address] for management of non-alcoholic steatohepatitis (LOUIS). The active problem list, all pertinent past medical history, medications, liver histology, radiologic findings and laboratory findings related to the liver disorder were reviewed with the patient. The patient is a 60 y.o.  female who was found to have LOUIS on liver biopsy in 5/2016. Her body weight has not changed significantly in the past 4 years, up ~15# in this time period. The patient underwent a repeat liver biopsy in 12/2020 to see if she could enter a clinical trial for treatment of LOUIS. The procedure was well tolerated. I have personally reviewed the liver biopsy slides. This demonstrates Louis with stage 3 fibrosis. The patient notes fatigue, arthralgias, myalgias. The patient has limitations in functional activities secondary to these symptoms. The patient has not experienced problems concentrating, swelling of the abdomen, swelling of the lower extremities, hematemesis, hematochezia.     ASSESSMENT AND PLAN:  MOFFETT  The diagnosis is based upon liver biopsy, features of metabolic syndrome, serologic studies that are negative for other causes of chronic liver disease. A liver biopsy performed in 5/2016 shows MOFFETT with stage 2 pericellular fibrosis. A liver biopsy in 12/2020 shows MOFFETT with stage 3 fibrosis. Fibroscan in 11/2020 suggests fatty liver and cirrhosis. Liver transaminases are normal.  ALP is normal.  Liver function is normal.  The platelet count is normal.      If the patient loses 20% of current body weight, which is ~50 pounds, down to a weight of of ~220 pounds, all steatosis will likely resolve. Once all steatosis has resolved all inflammation will resolve. Then all fibrosis will gradually resolve and the liver could eventually be normal.    The patient has been unable to lose any weight in the past 4 years. The patient was offered participation in a clinical trial for treatment of MOFFETT. The patient would like to participate in a clinical trial if eligible. Treatment of other medical problems in patients with chronic liver disease  There are no contraindications for the patient to take most medications that are necessary for treatment of other medical issues. The patient does not comsume alcohol on a daily basis. Normal doses of acetaminophen, as recommended on the label of the bottle, are not hepatotoxic except in the setting of daily alcohol use, even in patients with cirrhosis and can be utilized for pain. Counseling for alcohol in patients with chronic liver disease  The patient was counseled regarding alcohol consumption and the effect of alcohol on chronic liver disease. The patient does not consume any significant amount of alcohol. Vaccinations   Vaccination for viral hepatitis A and B is recommended since the patient has no serologic evidence of previous exposure or vaccination with immunity.   Routine vaccinations against other bacterial and viral agents can be performed as indicated. Annual flu vaccination should be administered if indicated. ALLERGIES  Allergies   Allergen Reactions    Liraglutide Nausea and Vomiting    No Allergy Information Available Other (comments)     Pt. Is allergic to Liraglutide    No Known Drug Allergies Unknown (comments)     Pt. Is allergic to Liraglutide    Pravastatin Other (comments)     Joint pain. MEDICATIONS  Current Outpatient Medications   Medication Sig    acetaminophen (Tylenol Extra Strength) 500 mg tablet Take 1,000 mg by mouth as needed for Pain.  ibuprofen (MOTRIN) 200 mg tablet Take 400 mg by mouth as needed for Pain.  metFORMIN (GLUCOPHAGE) 500 mg tablet Take 500 mg by mouth nightly.  levothyroxine (Unithroid) 175 mcg tablet Take 175 mcg by mouth Daily (before breakfast).  amLODIPine (NORVASC) 5 mg tablet Take 5 mg by mouth nightly.  escitalopram oxalate (LEXAPRO) 20 mg tablet Take 40 mg by mouth nightly.  lamoTRIgine (LaMICtaL) 150 mg tablet Take 150 mg by mouth nightly.  somatropin (Norditropin FlexPro) 5 mg/1.5 mL (3.3 mg/mL) pnij SET PEN TO DOSE 0.4MG AND INJECT SUBCUTANEOUSLY EVERY DAY    doxepin (SINEquan) 25 mg capsule TAKE ONE CAPSULE BY MOUTH EVERY NIGHT AT BEDTIME    omeprazole (PRILOSEC) 20 mg capsule TAKE 1 CAPSULE BY MOUTH TWICE DAILY     No current facility-administered medications for this visit. SYSTEM REVIEW NOT RELATED TO LIVER DISEASE OR REVIEWED ABOVE:  Constitution systems: Negative for fever, chills, weight gain, weight loss. Eyes: Negative for visual changes. ENT: Negative for sore throat, painful swallowing. Respiratory: SOB. Cardiology: Chest pains. Negative cardiac catherization. GI:  Negative for constipation or diarrhea. : Negative for urinary frequency, dysuria, hematuria, nocturia. Skin: Negative for rash. Hematology: Negative for easy bruising, blood clots. Musculo-skeletal: Negative for back pain, muscle pain, weakness.   Neurologic: Negative for headaches, dizziness, vertigo, memory problems not related to HE. Psychology: Negative for anxiety, depression. FAMILY HISTORY:  The father  of heart disease. The mother  at age 80s. There is no family history of liver disease. SOCIAL HISTORY:  The patient is . The patient has 3 children, and 2 grandchildren. The patient stopped using tobacco products in . The patient has never consumed significant amounts of alcohol. The patient used to work  for [employer]. The patient has not worked since . PHYSICAL EXAMINATION PERFORMED BY VIRTUAL TELEHEALTH:  VS: Not performed   General: No acute distress. Eyes: Sclera anicteric. ENT: No oral lesions. Skin: No rashes. spider angiomata. No jaundice. Abdomen: No obvious distention suggesting ascites. Extremities: No edema. No muscle wasting. Neurologic: Alert and oriented. Cranial nerves grossly intact.       LABORATORY STUDIES:  Liver [place] of [address] Units 10/20/2020 2020   WBC 3.4 - 10.8 x10E3/uL 5.1 6.7   ANC 1.8 - 8.0 K/UL  4.9   HGB 11.1 - 15.9 g/dL 13.1 13.7    - 450 x10E3/uL 246 244   INR 0.9 - 1.2 1.1    AST 0 - 40 IU/L 43 (H) 36   ALT 0 - 32 IU/L 38 (H) 45   Alk Phos 39 - 117 IU/L 90 86   Bili, Total 0.0 - 1.2 mg/dL 0.4 0.5   Bili, Direct 0.00 - 0.40 mg/dL 0.18    Albumin 3.8 - 4.8 g/dL 4.1 3.6   BUN 8 - 27 mg/dL 13 10   Creat 0.57 - 1.00 mg/dL 0.85 0.99   Na 134 - 144 mmol/L 141 141   K 3.5 - 5.2 mmol/L 4.1 3.7   Cl 96 - 106 mmol/L 103 108   CO2 20 - 29 mmol/L 25 26   Glucose 65 - 99 mg/dL 151 (H) 142 (H)     SEROLOGIES:  Serologies Latest Ref Rng 2015   Hep A Ab, Total Negative Negative    Hep B Surface Ag Negative Negative    Hep B Core Ab, Total Negative Negative    Hep B Surface AB QL  Non Reactive    Hep C Ab 0.0 - 0.9 s/co ratio  <0.1   Ferritin 15 - 150 ng/mL 23    Iron % Saturation 15 - 55 % 18 ROBBY, IFA  Negative    ASMCA 0 - 19 Units 25 (H)    Ceruloplasmin 19.0 - 39.0 mg/dL 26.9    Alpha-1 antitrypsin level 90 - 200 mg/dL 181      LIVER HISTOLOGY:  5/2016. Slides reviewed by MLS. MOFFETT. 75-90% macro and micovesicular steatosis. Mild ballooning. Moderate inflammation. pericellular fibrosis. 10/2020. FibroScan performed at [facility]. EkPa was 26.4. Suggested fibrosis level is F4. CAP score is 400, this is consistent with steatosis. 12/2020. Slides reviewed by MLS. MOFFETT. 66-75% macrovesicualr and micovesicular steatosis, Moderte inflammation, Severe ballooning, Stage 3 fibrosis. ORION (322). ENDOSCOPIC PROCEDURES:  Not available or performed    RADIOLOGY:  3/2016. Ultrasound of liver. Echogenic consistent with fatty liver. No liver mass lesions. No dilated bile ducts. No ascites. 12/2016. CT Abd and Pelvis. Status post hysterectomy. No acute abnormality. Hepatic steatosis. OTHER TESTING:  Not available or performed    FOLLOW-UP AFTER VIRTUAL VISIT:  Pursuant to the emergency declaration under the [facility], [facility] waiver authority and the [employer] Act, this Virtual  Visit was conducted, with the patient's (and/or their legal guardian's) consent, to reduce the patient's risk of exposure to COVID-19 and provide necessary medical care. Services were provided through a video synchronous discussion virtually to substitute for an in-person clinic visit. The patient was located in their home. The provider was located in the [facility] office. All of the issues listed above in the Assessment and Plan were discussed with the patient. All questions were answered. The patient expressed a clear understanding of the above.     An in-person follow-up visit will be scheduled at [place] [address] in 2-4 weeks for screening and enrollment into a clinical trial for treatment of MOFFETT.         Oliva Caceres MD  [facility] [address], [address] [place] [address] [place]  [address].  [phone]  [facility] [place] [address]

## 2021-01-14 DIAGNOSIS — K21.9 GASTROESOPHAGEAL REFLUX DISEASE: ICD-10-CM

## 2021-01-27 ENCOUNTER — DOCUMENTATION ONLY (OUTPATIENT)
Dept: HEMATOLOGY | Age: 66
End: 2021-01-27

## 2021-01-27 NOTE — TELEPHONE ENCOUNTER
----- Message from Ned Ames sent at 1/27/2021 12:41 PM EST -----  Regarding: Dr. Ting Toledo telephone  Medication Refill    Caller (if not patient): self      Relationship of caller (if not patient): self      Best contact number(s): [phone]      Name of medication and dosage if known: 'omeprazode 20mg'      Is patient out of this medication (yes/no):yes      Pharmacy name: [address] listed in chart? (yes/no): Yes  Pharmacy phone number: [phone]      Details to clarify the request: RX is no longer [facility]. [facility] has been trying to contact for refill this script- three time (per pt)      Ned Ames

## 2021-01-27 NOTE — PROGRESS NOTES
Chart reviewed to day for possible consideration into [place] [address] MOFFETT clinical trial.  Pt does not meet criteria to screen due to use of Lamictal and Synthroid. 54.45

## 2021-02-20 DIAGNOSIS — E11.9 TYPE 2 DIABETES MELLITUS WITHOUT COMPLICATIONS (HCC): ICD-10-CM

## 2021-02-23 ENCOUNTER — DOCUMENTATION ONLY (OUTPATIENT)
Dept: HEMATOLOGY | Age: 66
End: 2021-02-23

## 2021-02-23 NOTE — PROGRESS NOTES
Telephone call placed to patient, she had left a message earlier today inquiring about being a candidate for one of MOFFETT drug trials. Informed patient after reviewing her chart/history that d/t one of her current allergy medications,(liraglutide) she would not be a candidate for the Sherry Nordis trial. Verbal understanding obtained from patient. Scheduled a 6 month follow up visit for her, Note patient had last biopsy on 12/20 which is good until 6/15/21 if another clinical trial becomes available that she would qualify for.

## 2021-03-18 ENCOUNTER — OFFICE VISIT (OUTPATIENT)
Dept: FAMILY MEDICINE CLINIC | Age: 66
End: 2021-03-18
Payer: COMMERCIAL

## 2021-03-18 VITALS
DIASTOLIC BLOOD PRESSURE: 62 MMHG | SYSTOLIC BLOOD PRESSURE: 124 MMHG | RESPIRATION RATE: 14 BRPM | BODY MASS INDEX: 43.71 KG/M2 | WEIGHT: 272 LBS | HEIGHT: 66 IN | HEART RATE: 75 BPM | OXYGEN SATURATION: 96 %

## 2021-03-18 DIAGNOSIS — E11.9 TYPE 2 DIABETES MELLITUS WITHOUT COMPLICATION, WITHOUT LONG-TERM CURRENT USE OF INSULIN (HCC): ICD-10-CM

## 2021-03-18 DIAGNOSIS — R15.9 INCONTINENCE OF FECES WITH FECAL URGENCY: Primary | ICD-10-CM

## 2021-03-18 DIAGNOSIS — I10 ESSENTIAL HYPERTENSION: ICD-10-CM

## 2021-03-18 DIAGNOSIS — E23.0 GROWTH HORMONE DEFICIENCY (HCC): ICD-10-CM

## 2021-03-18 DIAGNOSIS — Z12.31 ENCOUNTER FOR SCREENING MAMMOGRAM FOR MALIGNANT NEOPLASM OF BREAST: ICD-10-CM

## 2021-03-18 DIAGNOSIS — K21.9 GASTROESOPHAGEAL REFLUX DISEASE, UNSPECIFIED WHETHER ESOPHAGITIS PRESENT: ICD-10-CM

## 2021-03-18 DIAGNOSIS — R15.2 INCONTINENCE OF FECES WITH FECAL URGENCY: Primary | ICD-10-CM

## 2021-03-18 PROBLEM — E55.9 VITAMIN D DEFICIENCY: Status: ACTIVE | Noted: 2021-03-18

## 2021-03-18 PROCEDURE — 99214 OFFICE O/P EST MOD 30 MIN: CPT | Performed by: FAMILY MEDICINE

## 2021-03-18 RX ORDER — OMEPRAZOLE 20 MG/1
20 CAPSULE, DELAYED RELEASE ORAL
Qty: 60 CAP | Refills: 5 | Status: SHIPPED | OUTPATIENT
Start: 2021-03-18 | End: 2022-03-18 | Stop reason: ALTCHOICE

## 2021-03-18 RX ORDER — METFORMIN HYDROCHLORIDE 500 MG/1
TABLET ORAL
Qty: 180 TAB | Refills: 1 | OUTPATIENT
Start: 2021-03-18

## 2021-03-18 RX ORDER — OMEPRAZOLE 20 MG/1
CAPSULE, DELAYED RELEASE ORAL
Qty: 60 CAP | Refills: 4 | OUTPATIENT
Start: 2021-03-18

## 2021-03-18 RX ORDER — AMLODIPINE BESYLATE 5 MG/1
5 TABLET ORAL
Qty: 90 TAB | Refills: 3 | Status: SHIPPED | OUTPATIENT
Start: 2021-03-18 | End: 2022-05-19 | Stop reason: SDUPTHER

## 2021-03-18 RX ORDER — METFORMIN HYDROCHLORIDE 500 MG/1
500 TABLET ORAL
Qty: 90 TAB | Refills: 3 | Status: SHIPPED | OUTPATIENT
Start: 2021-03-18 | End: 2022-03-18 | Stop reason: ALTCHOICE

## 2021-03-18 NOTE — PATIENT INSTRUCTIONS
Fecal Incontinence: Care Instructions Your Care Instructions Fecal incontinence is the loss of normal control of your bowels. You may not be able to reach the toilet in time for a bowel movement, or stool may leak from your anus. Fecal incontinence can be caused by constipation, diarrhea, or anxiety or other emotional stress. It can also result from nerve injury, muscle damage (especially from childbirth), lack of exercise, or poor diet. Treatment of fecal incontinence depends on what caused it and how bad it is. It may include changes to your diet, medicine, bowel training, or surgery. More than one treatment may be needed. Loss of bowel control can be hard to deal with. You may feel ashamed or embarrassed, and you may not want to leave the house because you fear that you might have an accident in public. But treatment can help you better control your bowels and manage your incontinence. Follow-up care is a key part of your treatment and safety. Be sure to make and go to all appointments, and call your doctor if you are having problems. It's also a good idea to know your test results and keep a list of the medicines you take. How can you care for yourself at home? · Keep a food diary of what you eat. This will help you learn which foods make your incontinence worse. · Eat small, frequent meals. Large meals may cause diarrhea. · Avoid constipation: 
? Include fruits, vegetables, beans, and whole grains in your diet each day. These foods are high in fiber. ? Drink plenty of fluids, enough so that your urine is light yellow or clear like water. If you have kidney, heart, or liver disease and have to limit fluids, talk with your doctor before you increase the amount of fluids you drink. ? Get some exercise every day. Build up slowly to 30 to 60 minutes a day on 5 or more days of the week. ? Take a fiber supplement, such as Citrucel or Metamucil, every day if needed.  Read and follow all instructions on the label. ? Schedule time each day for a bowel movement. Having a daily routine may help. Take your time and do not strain when having a bowel movement. · Take your medicines exactly as prescribed. Call your doctor if you think you are having a problem with your medicine. You will get more details on the specific medicines your doctor prescribes. · Do pelvic floor (Kegel) exercises, which tighten and strengthen the pelvic muscles. To do Kegel exercises: 
? Squeeze the same muscles you would use to stop your urine. Your belly and thighs should not move. ? Hold the squeeze for 3 seconds, and then relax for 3 seconds. ? Start with 3 seconds. Then add 1 second each week until you are able to squeeze for 10 seconds. ? Repeat the exercise 10 to 15 times a session. Do three or more sessions a day. When should you call for help? Call your doctor now or seek immediate medical care if: 
  · You have new or worse pain.  
  · You have new or worse bleeding from the rectum. Watch closely for changes in your health, and be sure to contact your doctor if: 
  · You cannot pass stools or gas.  
  · You do not get better as expected. Where can you learn more? Go to http://kelli-carl.info/ Enter J511 in the search box to learn more about \"Fecal Incontinence: Care Instructions. \" Current as of: April 15, 2020               Content Version: 12.6 © 9679-6488 [employer]. Care instructions adapted under license by [employer] (which disclaims liability or warranty for this information). If you have questions about a medical condition or this instruction, always ask your healthcare professional. [place] [address] any warranty or liability for your use of this information.

## 2021-03-18 NOTE — PROGRESS NOTES
Subjective:      Dane Landa is a 60 y.o. female here with episode of fecal urgency and incontinence. Has occurred twice; first episode in Feb and last episode a few days ago. States that she was aware that she needed to go, but was unable to hold in. Reports pain in the right flank and RUQ after the episode, dull. Denies low back pain, saddle anesthesia, urinary incontinence. No new dietary or medication changes. Diabetes mellitus: due for labs. · Medication compliance: compliant all of the time,   · Diabetic diet compliance: compliant most of the time,   · Home glucose monitoring: is not performed,   · Further diabetic ROS: no chest pain, dyspnea or TIA's, no numbness, tingling or pain in extremities, no unusual visual symptoms, no medication side effects noted. Current Outpatient Medications   Medication Sig Dispense Refill    acetaminophen (Tylenol Extra Strength) 500 mg tablet Take 1,000 mg by mouth as needed for Pain.  ibuprofen (MOTRIN) 200 mg tablet Take 400 mg by mouth as needed for Pain.  metFORMIN (GLUCOPHAGE) 500 mg tablet Take 500 mg by mouth nightly.  levothyroxine (Unithroid) 175 mcg tablet Take 175 mcg by mouth Daily (before breakfast).  amLODIPine (NORVASC) 5 mg tablet Take 5 mg by mouth nightly.  escitalopram oxalate (LEXAPRO) 20 mg tablet Take 40 mg by mouth nightly.  lamoTRIgine (LaMICtaL) 150 mg tablet Take 150 mg by mouth nightly.  somatropin (Norditropin FlexPro) 5 mg/1.5 mL (3.3 mg/mL) pnij 0.6 mg.      doxepin (SINEquan) 25 mg capsule TAKE ONE CAPSULE BY MOUTH EVERY NIGHT AT BEDTIME 90 Cap 1    omeprazole (PRILOSEC) 20 mg capsule TAKE 1 CAPSULE BY MOUTH TWICE DAILY (Patient taking differently: TAKE 1 CAPSULE BY MOUTH as needed) 60 Cap 5       Allergies   Allergen Reactions    Liraglutide Nausea and Vomiting    No Allergy Information Available Other (comments)     Pt.  Is allergic to Liraglutide    No Known Drug Allergies Unknown (comments)     Pt. Is allergic to Liraglutide    Pravastatin Other (comments)     Joint pain. Past Medical History:   Diagnosis Date    Depression     Diabetes ([address]     Gastrointestinal disorder     gerd    GERD (gastroesophageal reflux disease)     High cholesterol     Liver disease     elevated liver enzymes/fatty liver    Obese     uses CPAP    Other ill-defined conditions(799.89)     fibromyalgia    Psychiatric disorder     depression    PUD (peptic ulcer disease)     yrs ago    Sleep apnea     Thyroid disease        Social History     Tobacco Use    Smoking status: Former Smoker    Smokeless tobacco: Never Used    Tobacco comment: Quit 16y ago. Substance Use Topics    Alcohol use: Yes     Alcohol/week: 0.0 standard drinks     Comment: rarely         Review of Systems  Pertinent items are noted in HPI. Objective:     Visit Vitals  /62 (BP 1 Location: Right arm, BP Patient Position: Sitting)   Pulse 75   Resp 14   Ht 5' 6\" (1.676 m)   Wt 272 lb (123.4 kg)   SpO2 96%   BMI 43.90 kg/m²      General appearance - alert, well appearing, and in no distress  Chest - clear to auscultation, no wheezes, rales or rhonchi, symmetric air entry, no tachypnea, retractions or cyanosis  Heart - normal rate, regular rhythm, normal S1, S2, no murmurs, rubs, clicks or gallops  Abdomen - soft, nontender, nondistended, no masses or organomegaly  Neurological - alert, oriented, normal speech, no focal findings or movement disorder noted  Mental Status - alert, oriented to person, place, and time, normal mood, behavior, speech, dress, motor activity, and thought processes. Assessment/Plan:   Manuel Renteria is a 60 y.o. female seen for:     1. Incontinence of feces with fecal urgency: 2 previous episodes with unclear precipitator. Recommend GI evaluation if this recurs to which she is in agreement.     2. Type 2 diabetes mellitus without complication, without long-term current use of insulin [facility]): controlled, continue with metformin. Check labs. - LIPID PANEL; Future  - METABOLIC PANEL, COMPREHENSIVE; Future  - metFORMIN (GLUCOPHAGE) 500 mg tablet; Take 1 Tab by mouth nightly. Dispense: 90 Tab; Refill: 3  - HEMOGLOBIN A1C WITH EAG; Future    3. Essential hypertension: controlled, continue with current therapy. - METABOLIC PANEL, COMPREHENSIVE; Future  - amLODIPine (NORVASC) 5 mg tablet; Take 1 Tab by mouth nightly. Dispense: 90 Tab; Refill: 3    4. Gastroesophageal reflux disease, unspecified whether esophagitis present: continue with PPI PRN. - omeprazole (PRILOSEC) 20 mg capsule; Take 1 Cap by mouth daily as needed (Heartburn). TAKE 1 CAPSULE BY MOUTH as needed  Dispense: 60 Cap; Refill: 5    5. Body mass index (BMI)40.0-44.9, adult [facility]  - I have reviewed/discussed the above normal BMI with the patient. I have recommended the following interventions: dietary management education, guidance, and counseling and encourage exercise . 6. Encounter for screening mammogram for malignant neoplasm of breast: screening mammogram ordered. - [facility] MAMMO BI SCREENING INCL CAD; Future    7. Growth hormone deficiency [facility]): managed by Dr. Juliana Burns, on Norditropin. I have discussed the diagnosis with the patient and the intended plan as seen in the above orders. The patient has received an after-visit summary and questions were answered concerning future plans. I have discussed medication side effects and warnings with the patient as well. Patient verbalizes understanding of plan of care and denies further questions or concerns at this time. Informed patient to return to the office if symptoms worsen or if new symptoms arise. Follow-up and Dispositions    · Return in about 6 months (around 9/18/2021) for follow up or sooner as needed.

## 2021-03-18 NOTE — PROGRESS NOTES
Tyler Moore is a 60 y.o. female    Chief Complaint   Patient presents with    Other     unable to control bowels   x2 days with c/o back and abdominal pain RUQ    Health Maintenance Due   Topic Date Due    Pneumococcal 0-64 years (1 of 1 - PPSV23) Never done    COVID-19 Vaccine (1) Never done    Shingrix Vaccine Age 50> (1 of 2) Never done    Breast Cancer Screen Mammogram  12/30/2017    Foot Exam Q1  10/01/2019    Lipid Screen  04/15/2020    Flu Vaccine (1) 09/01/2020    PAP AKA CERVICAL CYTOLOGY  11/19/2020    MICROALBUMIN Q1  01/08/2021       Visit Vitals  /62 (BP 1 Location: Right arm, BP Patient Position: Sitting)   Pulse 75   Resp 14   Ht 5' 6\" (1.676 m)   Wt 272 lb (123.4 kg)   SpO2 96%   BMI 43.90 kg/m²       3 most recent PHQ Screens 1/8/2020   PHQ Not Done -   Little interest or pleasure in doing things Nearly every day   Feeling down, depressed, irritable, or hopeless Nearly every day   Total Score PHQ 2 6   Trouble falling or staying asleep, or sleeping too much Nearly every day   Feeling tired or having little energy Nearly every day   Poor appetite, weight loss, or overeating Nearly every day   Feeling bad about yourself - or that you are a failure or have let yourself or your family down Nearly every day   Trouble concentrating on things such as school, work, reading, or watching TV Nearly every day   Moving or speaking so slowly that other people could have noticed; or the opposite being so fidgety that others notice Nearly every day   Thoughts of being better off dead, or hurting yourself in some way Several days   PHQ 9 Score 25   How difficult have these problems made it for you to do your work, take care of your home and get along with others Very difficult       Fall Risk Assessment, last 12 mths 3/18/2021   Able to walk? Yes   Fall in past 12 months? 0   Do you feel unsteady?  0   Are you worried about falling 0       Abuse Screening Questionnaire 3/18/2021   Do you ever feel afraid of your partner? N   Are you in a relationship with someone who physically or mentally threatens you? N   Is it safe for you to go home? Y         1. Have you been to the ER, urgent care clinic since your last visit? Hospitalized since your last visit?no    2. Have you seen or consulted any other health care providers outside of the [address] [place] [address] since your last visit? Include any pap smears or colon screening.  Yes Dr Alexa Blanco hormonal specialist

## 2021-03-19 LAB
ALBUMIN SERPL-MCNC: 4 G/DL (ref 3.8–4.8)
ALBUMIN/GLOB SERPL: 1.5 {RATIO} (ref 1.2–2.2)
ALP SERPL-CCNC: 96 IU/L (ref 39–117)
ALT SERPL-CCNC: 36 IU/L (ref 0–32)
AST SERPL-CCNC: 44 IU/L (ref 0–40)
BILIRUB SERPL-MCNC: 0.6 MG/DL (ref 0–1.2)
BUN SERPL-MCNC: 8 MG/DL (ref 8–27)
BUN/CREAT SERPL: 11 (ref 12–28)
CALCIUM SERPL-MCNC: 9.7 MG/DL (ref 8.7–10.3)
CHLORIDE SERPL-SCNC: 104 MMOL/L (ref 96–106)
CHOLEST SERPL-MCNC: 198 MG/DL (ref 100–199)
CO2 SERPL-SCNC: 27 MMOL/L (ref 20–29)
CREAT SERPL-MCNC: 0.76 MG/DL (ref 0.57–1)
EST. AVERAGE GLUCOSE BLD GHB EST-MCNC: 137 MG/DL
GLOBULIN SER CALC-MCNC: 2.6 G/DL (ref 1.5–4.5)
GLUCOSE SERPL-MCNC: 111 MG/DL (ref 65–99)
HBA1C MFR BLD: 6.4 % (ref 4.8–5.6)
HDLC SERPL-MCNC: 38 MG/DL
IMP & REVIEW OF LAB RESULTS: NORMAL
LDLC SERPL CALC-MCNC: 130 MG/DL (ref 0–99)
Lab: NORMAL
POTASSIUM SERPL-SCNC: 4.6 MMOL/L (ref 3.5–5.2)
PROT SERPL-MCNC: 6.6 G/DL (ref 6–8.5)
SODIUM SERPL-SCNC: 143 MMOL/L (ref 134–144)
TRIGL SERPL-MCNC: 166 MG/DL (ref 0–149)
VLDLC SERPL CALC-MCNC: 30 MG/DL (ref 5–40)

## 2021-05-04 ENCOUNTER — TELEPHONE (OUTPATIENT)
Dept: FAMILY MEDICINE CLINIC | Age: 66
End: 2021-05-04

## 2021-05-04 NOTE — TELEPHONE ENCOUNTER
----- Message from Yun Pak sent at 5/4/2021  2:19 PM EDT -----  Regarding: /Telephone  Contact: [phone]  General Message/Vendor Calls    Caller's first and last name: Pt.       Reason for call: Pharmacy has been trying to reach pcp for refills on medications with no response. Pt is about to be out of medication. Callback required yes/no and why: Yes, confirm Rx sent. Best contact number(s): [phone]      Details to clarify the request: N/a.       Yun Pak

## 2021-05-07 ENCOUNTER — HOSPITAL ENCOUNTER (OUTPATIENT)
Dept: MAMMOGRAPHY | Age: 66
Discharge: HOME | End: 2021-05-07
Attending: FAMILY MEDICINE
Payer: COMMERCIAL

## 2021-05-07 DIAGNOSIS — Z12.31 ENCOUNTER FOR SCREENING MAMMOGRAM FOR MALIGNANT NEOPLASM OF BREAST: ICD-10-CM

## 2021-05-07 PROCEDURE — 77067 SCR MAMMO BI INCL CAD: CPT

## 2021-05-18 ENCOUNTER — OFFICE VISIT (OUTPATIENT)
Dept: HEMATOLOGY | Age: 66
End: 2021-05-18
Payer: COMMERCIAL

## 2021-05-18 VITALS
SYSTOLIC BLOOD PRESSURE: 131 MMHG | TEMPERATURE: 96.9 F | HEIGHT: 66 IN | BODY MASS INDEX: 42.75 KG/M2 | DIASTOLIC BLOOD PRESSURE: 69 MMHG | WEIGHT: 266 LBS | OXYGEN SATURATION: 93 % | RESPIRATION RATE: 17 BRPM | HEART RATE: 81 BPM

## 2021-05-18 DIAGNOSIS — K75.81 NASH (NONALCOHOLIC STEATOHEPATITIS): Primary | ICD-10-CM

## 2021-05-18 PROCEDURE — 99213 OFFICE O/P EST LOW 20 MIN: CPT | Performed by: PHYSICIAN ASSISTANT

## 2021-05-18 NOTE — PROGRESS NOTES
Identified pt with two pt identifiers(name and ). Reviewed record in preparation for visit and have obtained necessary documentation. Chief Complaint   Patient presents with    Fatty Liver     f/u      Vitals:    21 0845   BP: 131/69   Pulse: 81   Resp: 17   Temp: 96.9 °F (36.1 °C)   TempSrc: Temporal   SpO2: 93%   Weight: 266 lb (120.7 kg)   Height: 5' 6\" (1.676 m)   PainSc:   7   PainLoc: Back       Health Maintenance Review: Patient reminded of \"due or due soon\" health maintenance. I have asked the patient to contact his/her primary care provider (PCP) for follow-up on his/her health maintenance. Coordination of Care Questionnaire:  :   1) Have you been to an emergency room, urgent care, or hospitalized since your last visit? If yes, where when, and reason for visit? no       2. Have seen or consulted any other health care provider since your last visit? If yes, where when, and reason for visit? no    Patient is accompanied by self I have received verbal consent from Mukul Merritt to discuss any/all medical information while they are present in the room.

## 2021-05-18 NOTE — PROGRESS NOTES
[address] [place] [address], MD, MD Rosy Álvarez PA-C Erskine Dare, [facility]     Sydney Paniagua [facility]   BRETT Nina-CONNIE Guy [facility]       Flakito Deputado Omar [address] 136    at [address], [address] [place] [address], [facility] 22.    [phone]    FAX: [address] [place] [address], [address]    [phone]    FAX: [phone]       Patient Care Team:  Pooja Gonsalves MD as PCP - General (Family Medicine)  Pooja Gonsalves MD as PCP - [address] [place] [address] Provider  Felipe Gonzalez MD (Gastroenterology)  Jasmeet Shook MD (Family Medicine)      Problem List  Date Reviewed: 3/18/2021          Codes Class Noted    Growth hormone deficiency ([facility] ICD-10-CM: E23.0  ICD-9-CM: 253.3  3/18/2021        Vitamin D deficiency ICD-10-CM: E55.9  ICD-9-CM: 268.9  3/18/2021        Obesity, morbid ([facility] ICD-10-CM: E66.01  ICD-9-CM: 278.01  6/28/2018        S/P TOMASA (total abdominal hysterectomy) ICD-10-CM: Z90.710  ICD-9-CM: V88.01  4/19/2016        Type II diabetes mellitus ([facility] ICD-10-CM: E11.9  ICD-9-CM: 250.00  4/19/2016        Essential hypertension ICD-10-CM: I10  ICD-9-CM: 401.9  3/23/2016        Hypercholesterolemia ICD-10-CM: E78.00  ICD-9-CM: 272.0  3/23/2016        Sleep apnea ICD-10-CM: G47.30  ICD-9-CM: 780.57  7/1/2014        MOFFETT (nonalcoholic steatohepatitis) ICD-10-CM: K75.81  ICD-9-CM: 571.8  6/28/2014        Lung nodule seen on imaging study (Chronic) ICD-10-CM: R91.1  ICD-9-CM: 793.11  6/28/2014    Overview Signed 6/28/2014  7:52 PM by Janeth Flores MD     3 mm in left upper lobe. Needs follow up CT in one year given smoking history.              Obesity (Chronic) ICD-10-CM: E66.9  ICD-9-CM: 278.00  9/5/2011        Hypothyroidism (Chronic) ICD-10-CM: E03.9  ICD-9-CM: 244.9  9/4/2011        Depression (Chronic) ICD-10-CM: F32.9  ICD-9-CM: 099  9/4/2011        GERD (Chronic) ICD-10-CM: K21.9  ICD-9-CM: 530.81  9/4/2011        Fibromyalgia (Chronic) ICD-10-CM: M79.7  ICD-9-CM: 729.1  9/4/2011              Erasmo Ferrari returns to the [address] for management of non-alcoholic steatohepatitis (MOFFETT). The active problem list, all pertinent past medical history, medications, liver histology, radiologic findings and laboratory findings related to the liver disorder were reviewed with the patient. The patient is a 60 y.o.  female who was found to have MOFFETT on liver biopsy in 5/2016. Her body weight has not changed significantly in the past 4 years. She is down ~5-6# in the past 2 months. The patient underwent a repeat liver biopsy in 12/2020 to see if she could enter a clinical trial for treatment of MOFFETT. The procedure was well tolerated and demonstrates MOFFETT with stage 3 fibrosis. Unfortunately, she is on medications for thyroid and has a past sensitivity to liraglutide which have prevented entry into two enrolling clinical trials at present. The patient notes fatigue, arthralgias, myalgias. She is continuing to have mild RUQ/flank pain on the right side. This is a dull achy pain and made worse with body position. She is scheduled for RUQ ultrasound next week through her GI office. She has had issues develop with bowel incontinence. She has had long-standing IBS and diarrhea. She is also scheduled for EGD and colonoscopy in 7/2021 with Dr Dianna Velasquez. The patient has limitations in functional activities secondary to these symptoms. The patient has not experienced problems concentrating, swelling of the abdomen, swelling of the lower extremities, hematemesis, hematochezia.     ASSESSMENT AND PLAN:  MOFFETT  The diagnosis is based upon liver biopsy, features of metabolic syndrome, serologic studies that are negative for other causes of chronic liver disease. A liver biopsy performed in 5/2016 shows MOFFETT with stage 2 pericellular fibrosis. Fibroscan in 11/2020 suggests fatty liver and cirrhosis. A repeat liver biopsy in 12/2020 shows MOFFETT with stage 3 fibrosis. Liver transaminases are mildly elevated. ALP is normal.  Liver function is normal.  The platelet count is normal.  I have reviewed recent lab studies from 3/2021 with the patient. If the patient loses 20% of current body weight, which is ~50 pounds, down to a weight of of ~220 pounds, all steatosis will likely resolve. Once all steatosis has resolved all inflammation will resolve. Then all fibrosis will gradually resolve and the liver could eventually be normal.    The patient has been unable to lose any weight in the past 4 years. We have discussed strategies for weight loss and set small goals in the next several months time interval.     We have screen her for enrollment in studies, she does not meet current criteria for enrollment . The patient would like to participate in a clinical trial if eligible. RUQ pain  She is scheduled for US of the liver next week through her local GI's office and I will review report when available. Suspect this may be due to capsular stretch/hepatomegaly associated with steatosis. Treatment of other medical problems in patients with chronic liver disease  There are no contraindications for the patient to take most medications that are necessary for treatment of other medical issues. The patient does not comsume alcohol on a daily basis. Normal doses of acetaminophen, as recommended on the label of the bottle, are not hepatotoxic except in the setting of daily alcohol use, even in patients with cirrhosis and can be utilized for pain.     Counseling for alcohol in patients with chronic liver disease  The patient was counseled regarding alcohol consumption and the effect of alcohol on chronic liver disease. The patient does not consume any significant amount of alcohol. Vaccinations   Vaccination for viral hepatitis A and B is recommended since the patient has no serologic evidence of previous exposure or vaccination with immunity. Routine vaccinations against other bacterial and viral agents can be performed as indicated. Annual flu vaccination should be administered if indicated. COVID vaccine pursuing. ALLERGIES  Allergies   Allergen Reactions    Liraglutide Nausea and Vomiting    No Allergy Information Available Other (comments)     Pt. Is allergic to Liraglutide    No Known Drug Allergies Unknown (comments)     Pt. Is allergic to Liraglutide    Pravastatin Other (comments)     Joint pain. MEDICATIONS  Current Outpatient Medications   Medication Sig    lamoTRIgine (LaMICtal) 150 mg tablet TAKE ONE TABLET BY MOUTH DAILY    doxepin (SINEquan) 25 mg capsule TAKE ONE CAPSULE BY MOUTH EVERY NIGHT AT BEDTIME    escitalopram oxalate (LEXAPRO) 20 mg tablet TAKE TWO TABLETS BY MOUTH DAILY    omeprazole (PRILOSEC) 20 mg capsule Take 1 Cap by mouth daily as needed (Heartburn). TAKE 1 CAPSULE BY MOUTH as needed    metFORMIN (GLUCOPHAGE) 500 mg tablet Take 1 Tab by mouth nightly.  amLODIPine (NORVASC) 5 mg tablet Take 1 Tab by mouth nightly.  acetaminophen (Tylenol Extra Strength) 500 mg tablet Take 1,000 mg by mouth as needed for Pain.  ibuprofen (MOTRIN) 200 mg tablet Take 400 mg by mouth as needed for Pain.  levothyroxine (Unithroid) 175 mcg tablet Take 175 mcg by mouth Daily (before breakfast).  somatropin (Norditropin FlexPro) 5 mg/1.5 mL (3.3 mg/mL) pnij 0.6 mg.     No current facility-administered medications for this visit. SYSTEM REVIEW NOT RELATED TO LIVER DISEASE OR REVIEWED ABOVE:  Constitution systems: Negative for fever, chills, weight gain, weight loss.    Eyes: Negative for visual changes. ENT: Negative for sore throat, painful swallowing. Respiratory: SOB. Cardiology: Chest pains. Negative cardiac catherization. GI:  Negative for constipation or diarrhea. Intermittent RUQ pain/pressure. : Negative for urinary frequency, dysuria, hematuria, nocturia. Skin: Negative for rash. Hematology: Negative for easy bruising, blood clots. Musculo-skeletal: Negative for back pain, muscle pain, weakness. Neurologic: Negative for headaches, dizziness, vertigo, memory problems not related to HE. Psychology: Negative for anxiety, depression. FAMILY HISTORY:  The father  of heart disease. The mother  at age 80s. There is no family history of liver disease. SOCIAL HISTORY:  The patient is . The patient has 3 children, and 2 grandchildren. The patient stopped using tobacco products in . The patient has never consumed significant amounts of alcohol. The patient used to work  for [employer]. The patient has not worked since . PHYSICAL EXAMINATION:  VS: per nursing note. General: No acute distress. Eyes: Sclera anicteric. ENT: No oral lesions. Skin: No rashes. spider angiomata. No jaundice. Abdomen: No obvious distention suggesting ascites. Extremities: No edema. No muscle wasting. Neurologic: Alert and oriented. Cranial nerves grossly intact.     LABORATORY STUDIES:  [facility] [place] [facility] [address] [place] [address] Units 3/18/2021 10/20/2020   WBC 3.4 - 10.8 x10E3/uL  5.1   ANC 1.8 - 8.0 K/UL     HGB 11.1 - 15.9 g/dL  13.1    - 450 x10E3/uL  246   INR 0.9 - 1.2  1.1   AST 0 - 40 IU/L 44 (H) 43 (H)   ALT 0 - 32 IU/L 36 (H) 38 (H)   Alk Phos 39 - 117 IU/L 96 90   Bili, Total 0.0 - 1.2 mg/dL 0.6 0.4   Bili, Direct 0.00 - 0.40 mg/dL  0.18   Albumin 3.8 - 4.8 g/dL 4.0 4.1   BUN 8 - 27 mg/dL 8 13   Creat 0.57 - 1.00 mg/dL 0.76 0.85   Na 134 - 144 mmol/L 143 141   K 3.5 - 5.2 mmol/L 4.6 4.1 Cl 96 - 106 mmol/L 104 103   CO2 20 - 29 mmol/L 27 25   Glucose 65 - 99 mg/dL 111 (H) 151 (H)     Liver [place] of [address] [place] [address] Ref Rng & Units 6/18/2020   WBC 3.4 - 10.8 x10E3/uL 6.7   ANC 1.8 - 8.0 K/UL 4.9   HGB 11.1 - 15.9 g/dL 13.7    - 450 x10E3/uL 244   INR 0.9 - 1.2    AST 0 - 40 IU/L 36   ALT 0 - 32 IU/L 45   Alk Phos 39 - 117 IU/L 86   Bili, Total 0.0 - 1.2 mg/dL 0.5   Bili, Direct 0.00 - 0.40 mg/dL    Albumin 3.8 - 4.8 g/dL 3.6   BUN 8 - 27 mg/dL 10   Creat 0.57 - 1.00 mg/dL 0.99   Na 134 - 144 mmol/L 141   K 3.5 - 5.2 mmol/L 3.7   Cl 96 - 106 mmol/L 108   CO2 20 - 29 mmol/L 26   Glucose 65 - 99 mg/dL 142 (H)   No additional labs drawn at today's office visit. SEROLOGIES:  Serologies Latest Ref Rng 4/19/2016 11/19/2015   Hep A Ab, Total Negative Negative    Hep B Surface Ag Negative Negative    Hep B Core Ab, Total Negative Negative    Hep B Surface AB QL  Non Reactive    Hep C Ab 0.0 - 0.9 s/co ratio  <0.1   Ferritin 15 - 150 ng/mL 23    Iron % Saturation 15 - 55 % 18    ROBBY, IFA  Negative    ASMCA 0 - 19 Units 25 (H)    Ceruloplasmin 19.0 - 39.0 mg/dL 26.9    Alpha-1 antitrypsin level 90 - 200 mg/dL 181      LIVER HISTOLOGY:  5/2016. Slides reviewed by MLS. MOFFETT. 75-90% macro and micovesicular steatosis. Mild ballooning. Moderate inflammation. pericellular fibrosis. 10/2020. FibroScan performed at [facility]. EkPa was 26.4. Suggested fibrosis level is F4. CAP score is 400, this is consistent with steatosis. 12/2020. Slides reviewed by MLS. MOFFETT. 66-75% macrovesicular and micovesicular steatosis, Moderte inflammation, Severe ballooning, Stage 3 fibrosis. ORION (322). ENDOSCOPIC PROCEDURES:  Not available or performed    RADIOLOGY:  3/2016. Ultrasound of liver. Echogenic consistent with fatty liver. No liver mass lesions. No dilated bile ducts. No ascites. 12/2016. CT Abd and Pelvis. Status post hysterectomy. No acute abnormality.  Hepatic steatosis. OTHER TESTING:  Not available or performed    FOLLOW-UP:    All of the issues listed above in the Assessment and Plan were discussed with the patient. All questions were answered. The patient expressed a clear understanding of the above. An in-person follow-up visit will be scheduled at [place] [address] in 6 months for monitoring purposes. Documentation reviewed and updated to reflect current, accurate patient information. Mya Hines PA-C  [facility], [address] [facility].  [phone]  [address]      5/2021. Ultrasound of liver. Echogenic consistent with chronic liver disease. No liver mass lesions. No dilated bile ducts. No ascites. +cholelithiasis with associated tenderness. splenomegaly identified. Patient is scheduled to see surgeon for consultation of GB removal on 6/10/2021.

## 2022-01-28 NOTE — PERIOP NOTES
Informed patient of COVID requirements, patient to complete COVID curbside testing at [address] [place] [address] Monday, January 31st or Tuesday, February 1st between [id number]. Patient verbalized understanding that COVID test is required to proceed with procedure.

## 2022-02-01 ENCOUNTER — TRANSCRIBE ORDER (OUTPATIENT)
Dept: REGISTRATION | Age: 67
End: 2022-02-01

## 2022-02-01 ENCOUNTER — HOSPITAL ENCOUNTER (OUTPATIENT)
Dept: LAB | Age: 67
Discharge: HOME | End: 2022-02-01
Payer: MEDICARE

## 2022-02-01 DIAGNOSIS — Z01.812 PRE-PROCEDURAL LABORATORY EXAMINATIONS: Primary | ICD-10-CM

## 2022-02-01 DIAGNOSIS — Z01.812 PRE-PROCEDURAL LABORATORY EXAMINATIONS: ICD-10-CM

## 2022-02-01 PROCEDURE — U0005 INFEC AGEN DETEC AMPLI PROBE: HCPCS

## 2022-02-02 LAB
SARS-COV-2, XPLCVT: NOT DETECTED
SOURCE, COVRS: NORMAL

## 2022-02-03 NOTE — PROGRESS NOTES
[address] [place] [address] Dr Jj Preprocedure Instructions      1. On the day of your surgery, please report to registration located on the 2nd floor of the  [facility]. yes    2. You must have a responsible adult to drive you to the hospital, stay at the hospital during your procedure and drive you home. If they leave your procedure will not be started (no exceptions). yes    3. Do not have anything to eat or drink (including water, gum, mints, coffee, and juice) after midnight. This does not apply to the medications you were instructed to take by your physician. yes  If you are currently taking Plavix, Coumadin, Aspirin, or other blood-thinning agents, contact your physician for special instructions. yes    4. If you are having a procedure that requires bowel prep: We recommend that you have only clear liquids the day before your procedure and begin your bowel prep by 5:00 pm.  You may continue to drink clear liquids until midnight. If for any reason you are not able to complete your prep please notify your physician immediately. yes    5. Have a list of all current medications, including vitamins, herbal supplements and any other over the counter medications. yes    6. If you wear glasses, contacts, dentures and/or hearing aids, they may be removed prior to procedure, please bring a case to store them in. yes    7. You should understand that if you do not follow these instructions your procedure may be cancelled. If your physical condition changes (I.e. fever, cold or flu) please contact your doctor as soon as possible. 8. It is important that you be on time. If for any reason you are unable to keep your appointment please call [phone] the day of or your physicians office prior to your scheduled procedure    9.  Have you received your COVID Vaccine? yes If no, you will need to receive a COVID test/swab here at [facility] the MOB parking lot Monday - Friday 8a - 11am. There are no Saturday or Sunday swabbing at any [facility]. (patient verbalizes understanding) yes

## 2022-02-04 ENCOUNTER — ANESTHESIA (OUTPATIENT)
Dept: ENDOSCOPY | Age: 67
End: 2022-02-04
Payer: MEDICARE

## 2022-02-04 ENCOUNTER — HOSPITAL ENCOUNTER (OUTPATIENT)
Age: 67
Setting detail: OUTPATIENT SURGERY
Discharge: HOME | End: 2022-02-04
Attending: INTERNAL MEDICINE | Admitting: INTERNAL MEDICINE
Payer: MEDICARE

## 2022-02-04 ENCOUNTER — ANESTHESIA EVENT (OUTPATIENT)
Dept: ENDOSCOPY | Age: 67
End: 2022-02-04
Payer: MEDICARE

## 2022-02-04 VITALS
OXYGEN SATURATION: 97 % | HEART RATE: 67 BPM | DIASTOLIC BLOOD PRESSURE: 60 MMHG | RESPIRATION RATE: 16 BRPM | TEMPERATURE: 97.9 F | SYSTOLIC BLOOD PRESSURE: 125 MMHG | HEIGHT: 66 IN | BODY MASS INDEX: 44.32 KG/M2 | WEIGHT: 275.8 LBS

## 2022-02-04 PROCEDURE — 77030013992 HC SNR POLYP ENDOSC BSC -B: Performed by: INTERNAL MEDICINE

## 2022-02-04 PROCEDURE — 76040000019: Performed by: INTERNAL MEDICINE

## 2022-02-04 PROCEDURE — 77030021593 HC FCPS BIOP ENDOSC BSC -A: Performed by: INTERNAL MEDICINE

## 2022-02-04 PROCEDURE — 88305 TISSUE EXAM BY PATHOLOGIST: CPT

## 2022-02-04 PROCEDURE — 74011000250 HC RX REV CODE- 250: Performed by: NURSE ANESTHETIST, CERTIFIED REGISTERED

## 2022-02-04 PROCEDURE — 2709999900 HC NON-CHARGEABLE SUPPLY: Performed by: INTERNAL MEDICINE

## 2022-02-04 PROCEDURE — 74011250636 HC RX REV CODE- 250/636: Performed by: NURSE ANESTHETIST, CERTIFIED REGISTERED

## 2022-02-04 PROCEDURE — 76060000031 HC ANESTHESIA FIRST 0.5 HR: Performed by: INTERNAL MEDICINE

## 2022-02-04 RX ORDER — FENTANYL CITRATE 50 UG/ML
INJECTION, SOLUTION INTRAMUSCULAR; INTRAVENOUS AS NEEDED
Status: DISCONTINUED | OUTPATIENT
Start: 2022-02-04 | End: 2022-02-04 | Stop reason: HOSPADM

## 2022-02-04 RX ORDER — PROPOFOL 10 MG/ML
INJECTION, EMULSION INTRAVENOUS
Status: DISCONTINUED | OUTPATIENT
Start: 2022-02-04 | End: 2022-02-04 | Stop reason: HOSPADM

## 2022-02-04 RX ORDER — NALOXONE HYDROCHLORIDE 0.4 MG/ML
0.4 INJECTION, SOLUTION INTRAMUSCULAR; INTRAVENOUS; SUBCUTANEOUS
Status: DISCONTINUED | OUTPATIENT
Start: 2022-02-04 | End: 2022-02-04 | Stop reason: HOSPADM

## 2022-02-04 RX ORDER — SODIUM CHLORIDE 9 MG/ML
50 INJECTION, SOLUTION INTRAVENOUS CONTINUOUS
Status: DISCONTINUED | OUTPATIENT
Start: 2022-02-04 | End: 2022-02-04 | Stop reason: HOSPADM

## 2022-02-04 RX ORDER — SODIUM CHLORIDE 9 MG/ML
INJECTION, SOLUTION INTRAVENOUS
Status: DISCONTINUED | OUTPATIENT
Start: 2022-02-04 | End: 2022-02-04 | Stop reason: HOSPADM

## 2022-02-04 RX ORDER — DEXTROMETHORPHAN/PSEUDOEPHED 2.5-7.5/.8
1.2 DROPS ORAL
Status: DISCONTINUED | OUTPATIENT
Start: 2022-02-04 | End: 2022-02-04 | Stop reason: HOSPADM

## 2022-02-04 RX ORDER — LIDOCAINE HYDROCHLORIDE 20 MG/ML
INJECTION, SOLUTION EPIDURAL; INFILTRATION; INTRACAUDAL; PERINEURAL AS NEEDED
Status: DISCONTINUED | OUTPATIENT
Start: 2022-02-04 | End: 2022-02-04 | Stop reason: HOSPADM

## 2022-02-04 RX ORDER — MIDAZOLAM HYDROCHLORIDE 1 MG/ML
.25-5 INJECTION, SOLUTION INTRAMUSCULAR; INTRAVENOUS
Status: DISCONTINUED | OUTPATIENT
Start: 2022-02-04 | End: 2022-02-04 | Stop reason: HOSPADM

## 2022-02-04 RX ORDER — PROPOFOL 10 MG/ML
INJECTION, EMULSION INTRAVENOUS AS NEEDED
Status: DISCONTINUED | OUTPATIENT
Start: 2022-02-04 | End: 2022-02-04 | Stop reason: HOSPADM

## 2022-02-04 RX ORDER — EPINEPHRINE 0.1 MG/ML
1 INJECTION INTRACARDIAC; INTRAVENOUS
Status: DISCONTINUED | OUTPATIENT
Start: 2022-02-04 | End: 2022-02-04 | Stop reason: HOSPADM

## 2022-02-04 RX ORDER — FLUMAZENIL 0.1 MG/ML
0.2 INJECTION INTRAVENOUS
Status: DISCONTINUED | OUTPATIENT
Start: 2022-02-04 | End: 2022-02-04 | Stop reason: HOSPADM

## 2022-02-04 RX ORDER — ATROPINE SULFATE 0.1 MG/ML
0.4 INJECTION INTRAVENOUS
Status: DISCONTINUED | OUTPATIENT
Start: 2022-02-04 | End: 2022-02-04 | Stop reason: HOSPADM

## 2022-02-04 RX ADMIN — SODIUM CHLORIDE: 9 INJECTION, SOLUTION INTRAVENOUS at 09:00

## 2022-02-04 RX ADMIN — FENTANYL CITRATE 50 MCG: 50 INJECTION INTRAMUSCULAR; INTRAVENOUS at 09:17

## 2022-02-04 RX ADMIN — PROPOFOL INJECTABLE EMULSION 20 MG: 10 INJECTION, EMULSION INTRAVENOUS at 09:30

## 2022-02-04 RX ADMIN — LIDOCAINE HYDROCHLORIDE 60 MG: 20 INJECTION, SOLUTION INTRAVENOUS at 09:21

## 2022-02-04 RX ADMIN — PROPOFOL 140 MCG/KG/MIN: 10 INJECTION, EMULSION INTRAVENOUS at 09:21

## 2022-02-04 RX ADMIN — PROPOFOL INJECTABLE EMULSION 60 MG: 10 INJECTION, EMULSION INTRAVENOUS at 09:21

## 2022-02-04 RX ADMIN — PROPOFOL INJECTABLE EMULSION 20 MG: 10 INJECTION, EMULSION INTRAVENOUS at 09:23

## 2022-02-04 NOTE — PROCEDURES
Darrick Andino M.D.  [phone]            2022          Colonoscopy Operative Report  Negrita Cid  :  [id number]  [facility] Medical Record Number:  [id number]      Indications:    Change in bowel habits, Diarrhea     :  Jensen Gan MD    Referring Provider: None    Sedation:  MAC anesthesia    Pre-Procedural Exam:      Airway: clear,  No airway problems anticipated  Heart: RRR, without gallops or rubs  Lungs: clear bilaterally without wheezes, crackles, or rhonchi  Abdomen: soft, nontender, nondistended, bowel sounds present  Mental Status: awake, alert and oriented to person, place and time     Procedure Details:  After informed consent was obtained with all risks and benefits of procedure explained and preoperative exam completed, the patient was taken to the endoscopy suite and placed in the left lateral decubitus position. Upon sequential sedation as per above, a digital rectal exam was performed. The Olympus videocolonoscope  was inserted in the rectum and carefully advanced to the cecum, which was identified by the ileocecal valve and appendiceal orifice, terminal ileum. The quality of preparation was good. The colonoscope was slowly withdrawn with careful inspection and evaluation between folds. Retroflexion in the rectum was performed. Findings:   Terminal Ileum: normal  Cecum: normal  Ascending Colon: normal  Transverse Colon: normal  Descending Colon: normal  Sigmoid: normal  Rectum: no mucosal lesion appreciated  Grade 1 internal hemorrhoid(s); Interventions:  none    Specimen Removed:  Random colon biopsies obtained and sent to pathology to rule out microscopic colitis    Complications: None. EBL:  None. Impression:  Small internal hemorrhoids, otherwise mucosa within normal.    Recommendations:  -Repeat colonoscopy in 5 years.   -High fiber low fat diet.    -Resume normal medication(s).      Discharge Disposition:  Home in the company of a  when able to ambulate.     Nerissa Sanchez MD  2/4/2022  9:42 AM

## 2022-02-04 NOTE — ANESTHESIA PREPROCEDURE EVALUATION
Anesthetic History   No history of anesthetic complications            Review of Systems / Medical History  Patient summary reviewed, nursing notes reviewed and pertinent labs reviewed    Pulmonary        Sleep apnea: CPAP           Neuro/Psych         Psychiatric history (depressin)     Cardiovascular              Hyperlipidemia    Exercise tolerance: <4 METS  Comments: Neg heart cath 12/15   GI/Hepatic/Renal     GERD: well controlled           Endo/Other    Diabetes (borderline)    Morbid obesity and arthritis     Other Findings   Comments: Fibromyalgia           Physical Exam    Airway  Mallampati: III  TM Distance: 4 - 6 cm  Neck ROM: normal range of motion   Mouth opening: Normal     Cardiovascular    Rhythm: regular  Rate: normal         Dental    Dentition: Upper dentition intact and Lower dentition intact     Pulmonary                 Abdominal         Other Findings            Anesthetic Plan    ASA: 2  Anesthesia type: MAC            Anesthetic plan and risks discussed with: Patient

## 2022-02-04 NOTE — PROGRESS NOTES
CORRECTION   Dr. Chiquita Lawton discussed with patient and family* procedure findings and next steps.   Glasses returned to patient

## 2022-02-04 NOTE — H&P
The patient is a 60year old female who presents with a complaint of Diarrhea. Note for \"Diarrhea\": Patient is a 60year old female who presents today for follow up. She was last seen 21 for fecal incontinence, loose stools and RLQ pain. Stool PCR, elastase, stool culture, calprotectin, C. Diff, O+P studies were all normal.  CBC normal  CRP was slightly elevated at 12. ESR also slightly elevated at 57. Immunoglobulin A was elevated at 371. Scheduled for EGD and colonoscopy with biopsies. Instructed to cut metformin in half day of prep and hold morning of. She reports diarrhea is sporadic, 6 episodes total.  She notes the RLQ only when she had the diarrhea episodes. She did try the dicyclomine and this was really helpful- has needed maybe 4 times. No fever/chills. She has seen blood in her stool since her last visit- bright red and suspects it was a hemorrhoid. It turned the water red once and then resolved. No constipation/hard/round stools or straining. Weight is stable. No vomiting. Once in a while getting nausea. Occassional heartburn as well but less than twice a week. She takes prilosec 20 mg PRN. Nausea seems separate from the heartburn.   She started PT this week for the incontinence and that went really well.    7 minute telehealth visit via PMD due to COVID-19      Problem List/Past Medical (Kris Braun; 2021 8:30 AM)  Diabetes Mellitus    Depression    Hypothyroidism    History of colon polyps (Z86.010)    Tubular adenoma (D36.9)    RLQ abdominal pain (R10.31)    Loose stools (R19.5)    Fecal incontinence (R15.9)      Past Surgical History (Kris Braun; 2021 8:30 AM)   Delivery    Hysterectomy    Deviated Nose Septum Surgery      Allergies (Kris Braun; 2021 8:30 AM)  No Known Drug Allergies   [2019]:  No Known Allergies   [2019]:    Medication History (Kris Braun; 2021 8:30 AM)  Dicyclomine HCl  (20MG Tablet, 1 (one) Tablet Oral TID, Taken starting 04/21/2021) Active. LaMICtal  (100MG Tablet, Oral daily) Active. Unithroid  (175MCG Tablet, 1 tab Oral daily) Active. Lexapro  (20MG Tablet, 2 tab Oral daily) Active. MetFORMIN HCl  (500MG Tablet, Oral BID) Active. AmLODIPine Besylate  (5MG Tablet, Oral daily) Active. PriLOSEC  (20MG Capsule DR, 1 cap Oral as needed) Active. Doxepin HCl  (25MG Capsule, 1 cap Oral daily) Active. Medications Reconciled     Family History (Stephen Pizano; 5/12/2021 8:30 AM)  Colon Cancer   First Degree Relatives. Heart Disease   Father. Hypertension   Mother. Lung Cancer   First Degree Relatives. Emphysema   First Degree Relatives. Social History (Stephen Pizano; 5/12/2021 8:30 AM)  Tobacco Use   Former smoker. Marital status   . Employment status   Retired. Alcohol Use   Occasional alcohol use. Blood Transfusion   No.    Diagnostic Studies History (Stephen Pizano; 5/12/2021 8:30 AM)  Colonoscopy   [2019]:  Endoscopy   [2010]:    Health Maintenance History (Stephen Pizano; 5/12/2021 8:30 AM)  Flu Vaccine   [2019]:  Pneumovax   None    Vitals (Stephen Pizano; 5/12/2021 8:30 AM)  5/12/2021 8:30 AM  Weight: 289 lb   Height: 66 in   Body Surface Area: 2.34 m²   Body Mass Index: 46.65 kg/m²                Physical Exam (Adry Woods PA-C; 5/12/2021 8:43 AM)  The physical exam findings are as follows:  Note:  no PE performed, telehealth due to [address] (Regi Stevenson. Chuck [facility]; 5/12/2021 8:47 AM)  Fecal incontinence (R15.9)  Impression: 3 bouts of incontinence with full BM- loose stool and so urgent no time to get to the restroom. Reports she has no control. Checking stool studies- normal however, CRP was slightly elevated at 12. ESR also slightly elevated at 57. Immunoglobulin A was elevated at 371. Discussed trial of gluten free diet up until a week before scope  Recommended fiber to bulk stools.  try to avoid Imodium as 1 dose gives her constipation. Referring to PT for evaluation given hx of episiotomy with vaginal delivery to see if she has poor sphincter tone and if PT can help- she notes this has been really helpful. For the RLQ pain/loose stools proceeding with colonoscopy for further evaluation  RLQ abdominal pain (R10.31)  Impression: Unchanged for several years. Can worsen during BM, particuarly lately with loose stools  No Fever/chills. Has seen blood in the toilet bowel once since her last visit  Kapil has helped. With her nausea, pain, loose stools will check abdominal US until her procedures in july to evaluate for gallbladder inflammation. If EGD/colon are normal and US is normal consider HIDA. Nausea (R11.0)  Impression: Reports occassional nausea. Recommended prilosec daily to see if this helps. EGD scheduled in July. Will check abdominal US in the meantime. Current Plans  ABD complete Ultrasound / non-billable (54132)  Patient is to call me for any questions or concerns. Pt Education - How to access health information online: discussed with patient and provided information.   Signed by Aure Stein PA-C (5/12/2021 8:47 AM)

## 2022-02-04 NOTE — PROCEDURES
Ramone Huitron M.D.  [phone]           2022                EGD Operative Report  Neelima Capps  :  1955  [address] [place] [address] Record Number:  [id number]      Indication:  Abdominal pain, epigastric, Abdominal pain, RUQ     : Arielle Bateman MD    Referring Provider:  None      Anesthesia/Sedation:  MAC anesthesia    Airway assessment: No airway problems anticipated    Pre-Procedural Exam:      Airway: clear, no airway problems anticipated  Heart: RRR, without gallops or rubs  Lungs: clear bilaterally without wheezes, crackles, or rhonchi  Abdomen: soft, nontender, nondistended, bowel sounds present  Mental Status: awake, alert and oriented to person, place and time       Procedure Details     After infomed consent was obtained for the procedure, with all risks and benefits of procedure explained the patient was taken to the endoscopy suite and placed in the left lateral decubitus position. Following sequential administration of sedation as per above, the endoscope was inserted into the mouth and advanced under direct vision to second portion of the duodenum. A careful inspection was made as the gastroscope was withdrawn, including a retroflexed view of the proximal stomach; findings and interventions are described below. Findings:   Esophagus:normal  Stomach: normal   Duodenum/jejunum: One diminutive polyp seen in the distal second portion removed by one excisional biopsy and sent to pathology. Otherwise mucosa within normal.    Therapies:  none    Specimens: Duodenal polyp and random duodenal biopsies to rule out celiac disease           Complications:   None; patient tolerated the procedure well. EBL:  None. Impression:    Diminutive duodenal polyp removed by excisional biopsy, otherwise mucosa within normal.    Recommendations:    -Await pathology.     Arielle Bateman MD

## 2022-02-04 NOTE — PROGRESS NOTES
Endoscopy discharge instructions have been reviewed and given to patient. The patient verbalized understanding and acceptance of instructions. Dr. Pedroza Cancer discussed with family procedure findings and next steps.

## 2022-02-04 NOTE — ANESTHESIA POSTPROCEDURE EVALUATION
Procedure(s):  COLONOSCOPY  ESOPHAGOGASTRODUODENOSCOPY (EGD)  ESOPHAGOGASTRODUODENAL (EGD) BIOPSY  ENDOSCOPIC POLYPECTOMY  COLON BIOPSY. MAC    Anesthesia Post Evaluation      Multimodal analgesia: multimodal analgesia used between 6 hours prior to anesthesia start to PACU discharge  Patient location during evaluation: PACU  Patient participation: complete - patient participated  Level of consciousness: awake and alert  Pain management: adequate  Airway patency: patent  Anesthetic complications: no  Cardiovascular status: acceptable  Respiratory status: acceptable  Hydration status: acceptable  Post anesthesia nausea and vomiting:  none  Final Post Anesthesia Temperature Assessment:  Normothermia (36.0-37.5 degrees C)      INITIAL Post-op Vital signs:   Vitals Value Taken Time   /60 02/04/22 1001   Temp 36.6 °C (97.9 °F) 02/04/22 0946   Pulse 66 02/04/22 1004   Resp 16 02/04/22 1004   SpO2 99 % 02/04/22 1004   Vitals shown include unvalidated device data.

## 2022-02-04 NOTE — DISCHARGE INSTRUCTIONS
[facility]. Stephen Morataya M.D.  [phone]                 COLON and EGD DISCHARGE INSTRUCTIONS    2022    Sydni Porter  :  1955  [facility] Record Number:  [id number]      DISCOMFORT:  Sore throat- throat lozenges or warm salt water gargle  Redness at IV site- apply warm compress to area; if redness or soreness persist- contact your physician  There may be a slight amount of blood passed from the rectum  Gaseous discomfort- walking, belching will help relieve any discomfort  You may not operate a vehicle for 12 hours  You may not engage in an occupation involving machinery or appliances for rest of today  You may not drink alcoholic beverages for at least 12 hours  Avoid making any critical decisions for at least 24 hour  DIET:   High fiber diet. Low fat diet. - however -  remember your colon is empty and a heavy meal will produce gas. Avoid these foods:  vegetables, fried / greasy foods, carbonated drinks for today     ACTIVITY:  You may  resume your normal daily activities it is recommended that you spend the remainder of the day resting -  avoid any strenuous activity and driving. CALL M.D. ANY SIGN OF:   Increasing pain, nausea, vomiting  Abdominal distension (swelling)  New increased bleeding (oral or rectal)  Fever (chills)  Pain in chest area  Bloody discharge from nose or mouth  Shortness of breath      Follow-up Instructions:   Call Dr. Flaco Rey if any questions at [phone]. Results of procedure / biopsy in 7 to 10 days, we will call you with these results. Your endoscopy showed one diminutive polyp that was removed and sent to pathology, otherwise mucosa within normal. Biopsies were obtained to rule out celiac disease. Your colonoscopy showed small internal hemorrhoids, otherwise normal mucosa throughout. Biopsies were obtained to rule out microscopic colitis.               Take Metamucil 1 tablespoon daily, can increase to twice daily based on bowel habits.

## 2022-02-04 NOTE — PROGRESS NOTES
eZenat Maurer  1955  [id number]    Situation:  Verbal report received from: Sammy Cherry  Procedure: Procedure(s):  COLONOSCOPY  ESOPHAGOGASTRODUODENOSCOPY (EGD)  ESOPHAGOGASTRODUODENAL (EGD) BIOPSY  ENDOSCOPIC POLYPECTOMY  COLON BIOPSY    Background:    Preoperative diagnosis: Loose stools [R19.5]  Postoperative diagnosis: 1.- duodenal polyp  2.- hemorrhoids    :  Dr. Ron Sylvester  Assistant(s): Endoscopy Technician-1: Astrid Mota  Endoscopy RN-1: Jen Loo    Specimens:   ID Type Source Tests Collected by Time Destination   1 : duodeanl polyp Preservative   Tsering Romero MD 2/4/2022 5598 Pathology   2 : duodeanl biopsy Preservative   Tsering Romero MD 2/4/2022 5233 Pathology   3 : random colon biopsies Preservative   Tsering Romero MD 2/4/2022 6869 Pathology     H. Pylori  no    Assessment:  Intra-procedure medications   Anesthesia gave intra-procedure sedation and medications, see anesthesia flow sheet yes    Intravenous fluids: NS@ KVO     Vital signs stable yes    Abdominal assessment: round and soft yes    Recommendation:  Discharge patient per MD order yes.   Return to floor na  Family or Friend family  Permission to share finding with family or friend yes

## 2022-02-04 NOTE — PROGRESS NOTES

## 2022-03-10 ENCOUNTER — OFFICE VISIT (OUTPATIENT)
Dept: HEMATOLOGY | Age: 67
End: 2022-03-10
Payer: MEDICARE

## 2022-03-10 VITALS
SYSTOLIC BLOOD PRESSURE: 126 MMHG | WEIGHT: 284 LBS | TEMPERATURE: 96.6 F | OXYGEN SATURATION: 97 % | HEART RATE: 65 BPM | RESPIRATION RATE: 17 BRPM | HEIGHT: 66 IN | DIASTOLIC BLOOD PRESSURE: 62 MMHG | BODY MASS INDEX: 45.64 KG/M2

## 2022-03-10 DIAGNOSIS — K75.81 NASH (NONALCOHOLIC STEATOHEPATITIS): Primary | ICD-10-CM

## 2022-03-10 DIAGNOSIS — E03.9 ACQUIRED HYPOTHYROIDISM: ICD-10-CM

## 2022-03-10 DIAGNOSIS — E11.9 TYPE 2 DIABETES MELLITUS WITHOUT COMPLICATION, WITHOUT LONG-TERM CURRENT USE OF INSULIN (HCC): ICD-10-CM

## 2022-03-10 LAB
ALBUMIN SERPL-MCNC: 3.9 G/DL (ref 3.5–5)
ALBUMIN/GLOB SERPL: 1.1 {RATIO} (ref 1.1–2.2)
ALP SERPL-CCNC: 96 U/L (ref 45–117)
ALT SERPL-CCNC: 54 U/L (ref 12–78)
ANION GAP SERPL CALC-SCNC: 4 MMOL/L (ref 5–15)
AST SERPL-CCNC: 55 U/L (ref 15–37)
BILIRUB DIRECT SERPL-MCNC: 0.3 MG/DL (ref 0–0.2)
BILIRUB SERPL-MCNC: 0.9 MG/DL (ref 0.2–1)
BUN SERPL-MCNC: 10 MG/DL (ref 6–20)
BUN/CREAT SERPL: 10 (ref 12–20)
CALCIUM SERPL-MCNC: 9.4 MG/DL (ref 8.5–10.1)
CHLORIDE SERPL-SCNC: 107 MMOL/L (ref 97–108)
CO2 SERPL-SCNC: 27 MMOL/L (ref 21–32)
CREAT SERPL-MCNC: 0.99 MG/DL (ref 0.55–1.02)
ERYTHROCYTE [DISTWIDTH] IN BLOOD BY AUTOMATED COUNT: 13.4 % (ref 11.5–14.5)
EST. AVERAGE GLUCOSE BLD GHB EST-MCNC: 131 MG/DL
GLOBULIN SER CALC-MCNC: 3.6 G/DL (ref 2–4)
GLUCOSE SERPL-MCNC: 125 MG/DL (ref 65–100)
HBA1C MFR BLD: 6.2 % (ref 4–5.6)
HCT VFR BLD AUTO: 44.9 % (ref 35–47)
HGB BLD-MCNC: 14 G/DL (ref 11.5–16)
MCH RBC QN AUTO: 28.9 PG (ref 26–34)
MCHC RBC AUTO-ENTMCNC: 31.2 G/DL (ref 30–36.5)
MCV RBC AUTO: 92.6 FL (ref 80–99)
NRBC # BLD: 0 K/UL (ref 0–0.01)
NRBC BLD-RTO: 0 PER 100 WBC
PLATELET # BLD AUTO: 218 K/UL (ref 150–400)
PMV BLD AUTO: 9.8 FL (ref 8.9–12.9)
POTASSIUM SERPL-SCNC: 4 MMOL/L (ref 3.5–5.1)
PROT SERPL-MCNC: 7.5 G/DL (ref 6.4–8.2)
RBC # BLD AUTO: 4.85 M/UL (ref 3.8–5.2)
SODIUM SERPL-SCNC: 138 MMOL/L (ref 136–145)
TSH SERPL DL<=0.05 MIU/L-ACNC: 45.7 UIU/ML (ref 0.36–3.74)
WBC # BLD AUTO: 6.3 K/UL (ref 3.6–11)

## 2022-03-10 PROCEDURE — G9899 SCRN MAM PERF RSLTS DOC: HCPCS | Performed by: PHYSICIAN ASSISTANT

## 2022-03-10 PROCEDURE — 99214 OFFICE O/P EST MOD 30 MIN: CPT | Performed by: PHYSICIAN ASSISTANT

## 2022-03-10 PROCEDURE — G8400 PT W/DXA NO RESULTS DOC: HCPCS | Performed by: PHYSICIAN ASSISTANT

## 2022-03-10 PROCEDURE — 3046F HEMOGLOBIN A1C LEVEL >9.0%: CPT | Performed by: PHYSICIAN ASSISTANT

## 2022-03-10 PROCEDURE — G8536 NO DOC ELDER MAL SCRN: HCPCS | Performed by: PHYSICIAN ASSISTANT

## 2022-03-10 PROCEDURE — G8427 DOCREV CUR MEDS BY ELIG CLIN: HCPCS | Performed by: PHYSICIAN ASSISTANT

## 2022-03-10 PROCEDURE — 1101F PT FALLS ASSESS-DOCD LE1/YR: CPT | Performed by: PHYSICIAN ASSISTANT

## 2022-03-10 PROCEDURE — G9717 DOC PT DX DEP/BP F/U NT REQ: HCPCS | Performed by: PHYSICIAN ASSISTANT

## 2022-03-10 PROCEDURE — G8417 CALC BMI ABV UP PARAM F/U: HCPCS | Performed by: PHYSICIAN ASSISTANT

## 2022-03-10 PROCEDURE — 3017F COLORECTAL CA SCREEN DOC REV: CPT | Performed by: PHYSICIAN ASSISTANT

## 2022-03-10 PROCEDURE — 1090F PRES/ABSN URINE INCON ASSESS: CPT | Performed by: PHYSICIAN ASSISTANT

## 2022-03-10 PROCEDURE — G8752 SYS BP LESS 140: HCPCS | Performed by: PHYSICIAN ASSISTANT

## 2022-03-10 PROCEDURE — G8754 DIAS BP LESS 90: HCPCS | Performed by: PHYSICIAN ASSISTANT

## 2022-03-10 PROCEDURE — 2022F DILAT RTA XM EVC RTNOPTHY: CPT | Performed by: PHYSICIAN ASSISTANT

## 2022-03-10 PROCEDURE — G0463 HOSPITAL OUTPT CLINIC VISIT: HCPCS | Performed by: PHYSICIAN ASSISTANT

## 2022-03-10 NOTE — PROGRESS NOTES
Identified pt with two pt identifiers(name and ). Reviewed record in preparation for visit and have obtained necessary documentation. Chief Complaint   Patient presents with    Fatty Liver     f/u      Vitals:    03/10/22 0843   BP: 126/62   Pulse: 65   Resp: 17   Temp: (!) 96.6 °F (35.9 °C)   TempSrc: Temporal   SpO2: 97%   Weight: 284 lb (128.8 kg)   Height: 5' 6\" (1.676 m)   PainSc:   0 - No pain       Health Maintenance Review: Patient reminded of \"due or due soon\" health maintenance. I have asked the patient to contact his/her primary care provider (PCP) for follow-up on his/her health maintenance. Coordination of Care Questionnaire:  :   1) Have you been to an emergency room, urgent care, or hospitalized since your last visit? If yes, where when, and reason for visit? no       2. Have seen or consulted any other health care provider since your last visit? If yes, where when, and reason for visit? Yes, Gall bladder surgery and LBX 2021 Dr Ester Farley at [facility]      Patient is accompanied by self I have received verbal consent from Reza Donis to discuss any/all medical information while they are present in the room.

## 2022-03-10 NOTE — PROGRESS NOTES
[address] [place] [address], Terra NOEL MD Mardeen Orf, PA-C    Jimmy Juarez, [facility]     April S Arcelia, [facility]   RED BashirP-CONNIE Nicole, [facility]       Flakito Jackson [facility] 136    at [facility]    [address] [facility], [address] [place] [address] Mirta  22.    [phone]    FAX: [address], [address]    [phone]    FAX: [phone]       Patient Care Team:  None as PCP - James Carmona MD (Gastroenterology)  Elif Edwards MD (Family Medicine)      Problem List  Date Reviewed: 5/18/2021          Codes Class Noted    Growth hormone deficiency ([facility] ICD-10-CM: E23.0  ICD-9-CM: 253.3  3/18/2021        Vitamin D deficiency ICD-10-CM: E55.9  ICD-9-CM: 268.9  3/18/2021        Obesity, morbid ([facility] ICD-10-CM: E66.01  ICD-9-CM: 278.01  6/28/2018        S/P TOMASA (total abdominal hysterectomy) ICD-10-CM: Z90.710  ICD-9-CM: V88.01  4/19/2016        Type II diabetes mellitus ([facility] ICD-10-CM: E11.9  ICD-9-CM: 250.00  4/19/2016        Essential hypertension ICD-10-CM: I10  ICD-9-CM: 401.9  3/23/2016        Hypercholesterolemia ICD-10-CM: E78.00  ICD-9-CM: 272.0  3/23/2016        Sleep apnea ICD-10-CM: G47.30  ICD-9-CM: 780.57  7/1/2014        MOFFETT (nonalcoholic steatohepatitis) ICD-10-CM: K75.81  ICD-9-CM: 571.8  6/28/2014        Lung nodule seen on imaging study (Chronic) ICD-10-CM: R91.1  ICD-9-CM: 793.11  6/28/2014    Overview Signed 6/28/2014  7:52 PM by Cyndi Mckee MD     3 mm in left upper lobe. Needs follow up CT in one year given smoking history.              Obesity (Chronic) ICD-10-CM: E66.9  ICD-9-CM: 278.00  9/5/2011        Hypothyroidism (Chronic) ICD-10-CM: E03.9  ICD-9-CM: 244.9 9/4/2011        Depression (Chronic) ICD-10-CM: F32. A  ICD-9-CM: 225  9/4/2011        GERD (Chronic) ICD-10-CM: K21.9  ICD-9-CM: 530.81  9/4/2011        Fibromyalgia (Chronic) ICD-10-CM: M79.7  ICD-9-CM: 729.1  9/4/2011              Cynthia Fonseca returns to the [facility] for management of non-alcoholic steatohepatitis (MOFFETT). The active problem list, all pertinent past medical history, medications, liver histology, radiologic findings and laboratory findings related to the liver disorder were reviewed with the patient. This is her first return office visit in ~10 months. The patient is a 73 y.o.  female who was found to have MOFFETT on liver biopsy in 5/2016. Her body weight has not changed significantly in the past 4 years. She is up ~18# in the past 12 months. She reports that she has not been following a specific diet and often is eating prepared foods out f convenience. The patient underwent a repeat liver biopsy in 12/2020 to see if she could enter a clinical trial for treatment of MOFFETT. The procedure was well tolerated and demonstrates MOFFETT with stage 3 fibrosis. The patient notes fatigue, arthralgias, myalgias. She has had RUQ/flank pain on the right side last year and went for gallbladder removal in 7/2021. She has had some resolution of these symptoms and continued mild, achy pain. This is aggravated by a lot of activity and certain body positions. She has easy fatigue and shortness of breath. Sleeps in a recliner as she can get comfortable in a recumbent position. She has sleep apnea and is supposed to be wearing CPAP. She has had issues develop with bowel incontinence. She has had long-standing IBS and diarrhea. She has had recent EGD and colonoscopy with Dr Danny Ghotra in 2/2022. Biopsies were negative for microscopic colitis. She has been recommended to increase fiber therapy.      She had been on metformin in the past but reports that she stopped taking this on her own in the past several months. She has not seen her endocrinologists for quite some time. The patient has limitations in functional activities secondary to these symptoms. The patient has not experienced problems concentrating, swelling of the abdomen, swelling of the lower extremities, hematemesis, hematochezia. ASSESSMENT AND PLAN:  MOFFETT  The diagnosis is based upon liver biopsy, features of metabolic syndrome, serologic studies that are negative for other causes of chronic liver disease. A liver biopsy performed in 5/2016 shows MOFFETT with stage 2 pericellular fibrosis. Fibroscan in 11/2020 suggests fatty liver and cirrhosis. A repeat liver biopsy in 12/2020 shows MOFFETT with stage 3 fibrosis. Liver transaminases are mildly elevated. ALP is normal.  Liver function is normal.  The platelet count is normal.  I have repeated additional lab values today, including AFP. I have asked her to start focusing on a targeted weight loss of ~10%, or 25-30# as a goal for this year. if she is able to do this, we would expect steatosis to lessen and for a reduction in inflammation. This would stabilize the liver condition and prevent further progression. We have discussed strategies for weight loss and set small goals in the next several months time interval.     RUQ pain  She has continued to have symptoms despite cholecystectomy. We will send her for additional evaluation of liver with ultrasound given her underlying advanced fibrosis, but I suspect some of this pain is due to hepatomegaly. I can easily palpate the liver. Treatment of other medical problems in patients with chronic liver disease  There are no contraindications for the patient to take most medications that are necessary for treatment of other medical issues. The patient does not comsume alcohol on a daily basis.   Normal doses of acetaminophen, as recommended on the label of the bottle, are not hepatotoxic except in the setting of daily alcohol use, even in patients with cirrhosis and can be utilized for pain. Counseling for alcohol in patients with chronic liver disease  The patient was counseled regarding alcohol consumption and the effect of alcohol on chronic liver disease. The patient does not consume any significant amount of alcohol. Vaccinations   Vaccination for viral hepatitis A and B is recommended since the patient has no serologic evidence of previous exposure or vaccination with immunity. Routine vaccinations against other bacterial and viral agents can be performed as indicated. Annual flu vaccination should be administered if indicated. COVID vaccine pursuing. ALLERGIES  Allergies   Allergen Reactions    Liraglutide Nausea and Vomiting    No Allergy Information Available Other (comments)     Pt. Is allergic to Liraglutide    No Known Drug Allergies Unknown (comments)     Pt. Is allergic to Liraglutide    Pravastatin Other (comments)     Joint pain. MEDICATIONS  Current Outpatient Medications   Medication Sig    lamoTRIgine (LaMICtal) 150 mg tablet TAKE ONE TABLET BY MOUTH DAILY (Patient not taking: Reported on 2/4/2022)    doxepin (SINEquan) 25 mg capsule TAKE ONE CAPSULE BY MOUTH EVERY NIGHT AT BEDTIME    escitalopram oxalate (LEXAPRO) 20 mg tablet TAKE TWO TABLETS BY MOUTH DAILY    omeprazole (PRILOSEC) 20 mg capsule Take 1 Cap by mouth daily as needed (Heartburn). TAKE 1 CAPSULE BY MOUTH as needed    metFORMIN (GLUCOPHAGE) 500 mg tablet Take 1 Tab by mouth nightly.  amLODIPine (NORVASC) 5 mg tablet Take 1 Tab by mouth nightly.  acetaminophen (Tylenol Extra Strength) 500 mg tablet Take 1,000 mg by mouth as needed for Pain. (Patient not taking: Reported on 2/4/2022)    ibuprofen (MOTRIN) 200 mg tablet Take 400 mg by mouth as needed for Pain.  levothyroxine (Unithroid) 175 mcg tablet Take 175 mcg by mouth Daily (before breakfast).     somatropin (Norditropin FlexPro) 5 mg/1.5 mL (3.3 mg/mL) pnij 0.6 mg. (Patient not taking: Reported on 2022)     No current facility-administered medications for this visit. SYSTEM REVIEW NOT RELATED TO LIVER DISEASE OR REVIEWED ABOVE:  Constitution systems: Negative for fever, chills. Positive for weight gain. Eyes: Negative for visual changes. ENT: Negative for sore throat, painful swallowing. Respiratory: SOB. Cardiology: Chest pains. Negative cardiac catherization. GI:  Negative for constipation or diarrhea. Intermittent RUQ pain/pressure. : Negative for urinary frequency, dysuria, hematuria, nocturia. Skin: Negative for rash. Hematology: Negative for easy bruising, blood clots. Musculo-skeletal: Negative for back pain, muscle pain, weakness. Neurologic: Negative for headaches, dizziness, vertigo, memory problems not related to HE. Psychology: Negative for anxiety, depression. FAMILY HISTORY:  The father  of heart disease. The mother  at age 80s. There is no family history of liver disease. SOCIAL HISTORY:  The patient is . The patient has 3 children, and 2 grandchildren. The patient stopped using tobacco products in . The patient has never consumed significant amounts of alcohol. The patient used to work  for [employer]. The patient has not worked since . PHYSICAL EXAMINATION:  VS: per nursing note. General: No acute distress. Eyes: Sclera anicteric. ENT: No oral lesions. Skin: No rashes. spider angiomata. No jaundice. Abdomen: No obvious distention suggesting ascites. Liver edge firm and easily palpable 3-4 cm BCM. Extremities: No edema. No muscle wasting. Neurologic: Alert and oriented. Cranial nerves grossly intact.     LABORATORY STUDIES:  Liver [place] of [address] Units 3/18/2021 10/20/2020   WBC 3.4 - 10.8 x10E3/uL  5.1   ANC 1.8 - 8.0 K/UL     HGB 11.1 - 15.9 g/dL  13.1  - 450 x10E3/uL  246   INR 0.9 - 1.2  1.1   AST 0 - 40 IU/L 44 (H) 43 (H)   ALT 0 - 32 IU/L 36 (H) 38 (H)   Alk Phos 39 - 117 IU/L 96 90   Bili, Total 0.0 - 1.2 mg/dL 0.6 0.4   Bili, Direct 0.00 - 0.40 mg/dL  0.18   Albumin 3.8 - 4.8 g/dL 4.0 4.1   BUN 8 - 27 mg/dL 8 13   Creat 0.57 - 1.00 mg/dL 0.76 0.85   Na 134 - 144 mmol/L 143 141   K 3.5 - 5.2 mmol/L 4.6 4.1   Cl 96 - 106 mmol/L 104 103   CO2 20 - 29 mmol/L 27 25   Glucose 65 - 99 mg/dL 111 (H) 151 (H)     Liver [place] of [address] [place] [address] Ref Rng & Units 6/18/2020   WBC 3.4 - 10.8 x10E3/uL 6.7   ANC 1.8 - 8.0 K/UL 4.9   HGB 11.1 - 15.9 g/dL 13.7    - 450 x10E3/uL 244   INR 0.9 - 1.2    AST 0 - 40 IU/L 36   ALT 0 - 32 IU/L 45   Alk Phos 39 - 117 IU/L 86   Bili, Total 0.0 - 1.2 mg/dL 0.5   Bili, Direct 0.00 - 0.40 mg/dL    Albumin 3.8 - 4.8 g/dL 3.6   BUN 8 - 27 mg/dL 10   Creat 0.57 - 1.00 mg/dL 0.99   Na 134 - 144 mmol/L 141   K 3.5 - 5.2 mmol/L 3.7   Cl 96 - 106 mmol/L 108   CO2 20 - 29 mmol/L 26   Glucose 65 - 99 mg/dL 142 (H)   Additional lab values drawn at today's office visit are pending at the time of documentation. SEROLOGIES:  Serologies Latest Ref Rng 4/19/2016 11/19/2015   Hep A Ab, Total Negative Negative    Hep B Surface Ag Negative Negative    Hep B Core Ab, Total Negative Negative    Hep B Surface AB QL  Non Reactive    Hep C Ab 0.0 - 0.9 s/co ratio  <0.1   Ferritin 15 - 150 ng/mL 23    Iron % Saturation 15 - 55 % 18    ROBBY, IFA  Negative    ASMCA 0 - 19 Units 25 (H)    Ceruloplasmin 19.0 - 39.0 mg/dL 26.9    Alpha-1 antitrypsin level 90 - 200 mg/dL 181      LIVER HISTOLOGY:  5/2016. Slides reviewed by MLS. MOFFETT. 75-90% macro and micovesicular steatosis. Mild ballooning. Moderate inflammation. pericellular fibrosis. 10/2020. FibroScan performed at [address]. EkPa was 26.4. Suggested fibrosis level is F4. CAP score is 400, this is consistent with steatosis. 12/2020. Slides reviewed by MLS. MOFFETT.  66-75% macrovesicular and micovesicular steatosis, Moderte inflammation, Severe ballooning, Stage 3 fibrosis. ORION (322). ENDOSCOPIC PROCEDURES:  2/4/2022. EGD performed by Dr Kyra Angel. No esophageal varices. No gastric varices. No portal gastropathy. One small duodenal polyp. 2/4/2022. Colonoscopy performed by Dr Kyra Angel. Small internal hemorrhoids, otherwise mucosa within normal. Repeat 5 years. RADIOLOGY:  3/2016. Ultrasound of liver. Echogenic consistent with fatty liver. No liver mass lesions. No dilated bile ducts. No ascites. 12/2016. CT Abd and Pelvis. Status post hysterectomy. No acute abnormality. Hepatic steatosis. 5/2021. Ultrasound of liver. Echogenic consistent with chronic liver disease. No liver mass lesions. No dilated bile ducts. No ascites. +cholelithiasis with associated tenderness. splenomegaly identified. OTHER TESTING:  Not available or performed    FOLLOW-UP:    All of the issues listed above in the Assessment and Plan were discussed with the patient. All questions were answered. The patient expressed a clear understanding of the above. An in-person follow-up visit will be scheduled at [place] [address] in 4 months for monitoring purposes. US in the meantime. Documentation reviewed and updated to reflect current, accurate patient information.     Jan Gottron, PA-C  Liver [facility] 59 [address] [facility] [place] Sahni  22.  [phone]  [address] [facility]

## 2022-03-11 LAB
AFP L3 MFR SERPL: 6.9 % (ref 0–9.9)
AFP SERPL-MCNC: 8.2 NG/ML (ref 0–9.2)

## 2022-03-11 NOTE — PROGRESS NOTES
Pt notified of stable liver functions/enzymes. Awaiting AFP and will notify if this is changed. I have advised her of her significantly high TSH and need for thyroid replacement adjustment. Will forward results to her endo and she will also call Dr Brian Collins for appt.

## 2022-03-18 ENCOUNTER — OFFICE VISIT (OUTPATIENT)
Dept: ENDOCRINOLOGY | Age: 67
End: 2022-03-18
Payer: MEDICARE

## 2022-03-18 VITALS — DIASTOLIC BLOOD PRESSURE: 67 MMHG | SYSTOLIC BLOOD PRESSURE: 145 MMHG | BODY MASS INDEX: 46.42 KG/M2 | WEIGHT: 287.6 LBS

## 2022-03-18 DIAGNOSIS — E03.9 ACQUIRED HYPOTHYROIDISM: Primary | ICD-10-CM

## 2022-03-18 DIAGNOSIS — E23.0 ADULT GROWTH HORMONE DEFICIENCY (HCC): ICD-10-CM

## 2022-03-18 PROBLEM — E55.9 VITAMIN D DEFICIENCY: Status: ACTIVE | Noted: 2021-03-18

## 2022-03-18 PROCEDURE — 3017F COLORECTAL CA SCREEN DOC REV: CPT | Performed by: INTERNAL MEDICINE

## 2022-03-18 PROCEDURE — G8400 PT W/DXA NO RESULTS DOC: HCPCS | Performed by: INTERNAL MEDICINE

## 2022-03-18 PROCEDURE — 99204 OFFICE O/P NEW MOD 45 MIN: CPT | Performed by: INTERNAL MEDICINE

## 2022-03-18 PROCEDURE — G8427 DOCREV CUR MEDS BY ELIG CLIN: HCPCS | Performed by: INTERNAL MEDICINE

## 2022-03-18 PROCEDURE — G9717 DOC PT DX DEP/BP F/U NT REQ: HCPCS | Performed by: INTERNAL MEDICINE

## 2022-03-18 PROCEDURE — G8756 NO BP MEASURE DOC: HCPCS | Performed by: INTERNAL MEDICINE

## 2022-03-18 PROCEDURE — G8536 NO DOC ELDER MAL SCRN: HCPCS | Performed by: INTERNAL MEDICINE

## 2022-03-18 PROCEDURE — G9899 SCRN MAM PERF RSLTS DOC: HCPCS | Performed by: INTERNAL MEDICINE

## 2022-03-18 PROCEDURE — G8417 CALC BMI ABV UP PARAM F/U: HCPCS | Performed by: INTERNAL MEDICINE

## 2022-03-18 PROCEDURE — 1090F PRES/ABSN URINE INCON ASSESS: CPT | Performed by: INTERNAL MEDICINE

## 2022-03-18 PROCEDURE — 1101F PT FALLS ASSESS-DOCD LE1/YR: CPT | Performed by: INTERNAL MEDICINE

## 2022-03-18 RX ORDER — LEVOTHYROXINE SODIUM 200 UG/1
TABLET ORAL
Qty: 90 TABLET | Refills: 1 | Status: SHIPPED | OUTPATIENT
Start: 2022-03-18 | End: 2022-07-12 | Stop reason: SDUPTHER

## 2022-03-18 NOTE — PROGRESS NOTES
Chief Complaint   Patient presents with    Thyroid Problem     pcp and pharmacy verified       * New Patient Visit  - last seen by me at my old practice  5/21    General:  Recent TSH is high   Is dealing with a liver condition - can be forgetful and confused - but does not think missed enough to cause the high TSH  Maybe only missed one once in awhile   Wt based is 206, is on 175mcg  Takes in AM with water, waits to eat 30 min or more  No Fe, Ca or MTV   No new meds  Did have choly 7/21  -  Was on nordotropin - got $3000 from them but only helped the 1st Rx of the year   Co-pay was $300 for 30 days after that   Has been off [address] since 7/21  Went on medicare 8/21     Thyroid Symptoms  **has decreased energy**, **has weight gain**, denies change in appetite, denies sleep issues, denies hair changes, **has dry skin**, denies sweats, denies hot/cold intolerance, denies tremor, denies palpitations, denies change in bowels, no change in menses and is menopausal, **has depression**, **has anxiety**, **has irritability**    Neck Symptoms  denies dysphagia, denies anterior neck pain, denies neck swelling, notes no nodules    Labs/Studies    6/5/20 TSH 0.052, FT4 1.88H  7/20/20 IGF-1 19  12/29/20 IGF-1 49, TSH 0.6  5/25/21 TSH 1.3, IGF-1 77      Lab Results   Component Value Date/Time    TSH 45.70 (H) 03/10/2022 09:58 AM    TSH 0.052 (L) 06/05/2020 08:47 AM    TSH 32.340 (H) 01/08/2020 12:00 AM    TSH 8.520 (H) 04/15/2019 11:05 AM    TSH 0.103 (L) 10/02/2018 09:19 AM    TSH 0.772 11/17/2017 10:49 AM    TSH 0.984 06/26/2017 08:32 AM    TSH 0.016 (L) 10/25/2016 09:37 AM    TSH 1.980 05/10/2016 10:35 AM    TSH 31.170 (H) 11/19/2015 10:21 AM    TSH 4.530 (H) 04/08/2015 12:00 AM    TSH 3.330 07/01/2014 03:14 PM    TSH 5.72 (H) 06/28/2014 12:05 PM    TSH 0.67 09/06/2011 03:15 AM        Past Medical History:   Diagnosis Date    Depression     [facility]     Gastrointestinal disorder     gerd    GERD (gastroesophageal reflux disease)     High cholesterol     Liver disease     elevated liver enzymes/fatty liver    Obese     uses CPAP    Other ill-defined conditions(799.89)     fibromyalgia    Psychiatric disorder     depression    PUD (peptic ulcer disease)     yrs ago    Sleep apnea     Thyroid disease         Blood pressure (!) 145/67, weight 287 lb 9.6 oz (130.5 kg). Weight Metrics 3/18/2022 3/10/2022 2/4/2022 5/18/2021 3/18/2021 12/17/2020 10/20/2020   Weight 287 lb 9.6 oz 284 lb 275 lb 12.7 oz 266 lb 272 lb 274 lb 274 lb 9.6 oz   BMI 46.42 kg/m2 45.84 kg/m2 44.51 kg/m2 42.93 kg/m2 43.9 kg/m2 44.22 kg/m2 44.32 kg/m2        EXAM:  - GENERAL: NCAT, Appears well nourished   - EYES: EOMI, non-icteric, no proptosis   - Ear/Nose/Throat: NCAT, no visible inflammation or masses   - CARDIOVASCULAR: no cyanosis, no visible JVD   - RESPIRATORY: respiratory effort normal without any distress or labored breathing   - MUSCULOSKELETAL: Normal ROM of neck and upper extremities observed   - SKIN: No rash on face  - NEUROLOGIC:  No facial asymmetry (Cranial nerve 7 motor function), No gaze palsy   - PSYCHIATRIC: Normal affect, Normal insight and judgement    Assessment/Plan:   1. Acquired hypothyroidism  Recent levels high  Suspect is one that small changes made big impact  Is on 175mcg, wt based ~204  Will up dose to 200mcg - advised watch for any hyper symptoms    2.  Adult growth hormone deficiency ([facility]  Unable to afford HGH (was on Norditropin FlexPro 5mg/1.5ml (3.3mg/ml) 0.6mg per day)  Advised have a patient using the [employer] to get it for free (nortropin)- she will look into it  The Norditropin did help out some but was not enough (only covered 1st Rx of the year)  Also on medicare now - possible they may cover, but likely would put in donut hole    Orders Placed This Encounter    TSH 3RD GENERATION     Standing Status:   Future     Standing Expiration Date:   9/18/2022    Unithroid 200 mcg tablet     Sig: Take one tablet daily on an empty stomach     Dispense:  90 Tablet     Refill:  1     BRAND NAME MEDICALLY NECESSARY        Follow-up and Dispositions    · Return in about 3 months (around 6/18/2022).   Follow-up and Disposition History

## 2022-03-19 PROBLEM — E23.0 GROWTH HORMONE DEFICIENCY (HCC): Status: ACTIVE | Noted: 2021-03-18

## 2022-03-20 PROBLEM — E66.01 OBESITY, MORBID (HCC): Status: ACTIVE | Noted: 2018-06-28

## 2022-03-24 ENCOUNTER — HOSPITAL ENCOUNTER (OUTPATIENT)
Dept: ULTRASOUND IMAGING | Age: 67
Discharge: HOME | End: 2022-03-24
Attending: PHYSICIAN ASSISTANT
Payer: MEDICARE

## 2022-03-24 DIAGNOSIS — K75.81 NASH (NONALCOHOLIC STEATOHEPATITIS): ICD-10-CM

## 2022-03-24 PROCEDURE — 76700 US EXAM ABDOM COMPLETE: CPT

## 2022-03-25 NOTE — PROGRESS NOTES
Pt notified of stable appearance. No source for RUQ pain other than hepatomegaly which is suspected as source. Follow-up as scheduled in 7/2022.

## 2022-05-10 ENCOUNTER — HOSPITAL ENCOUNTER (EMERGENCY)
Age: 67
Discharge: HOME | End: 2022-05-10
Attending: EMERGENCY MEDICINE
Payer: MEDICARE

## 2022-05-10 ENCOUNTER — APPOINTMENT (OUTPATIENT)
Dept: GENERAL RADIOLOGY | Age: 67
End: 2022-05-10
Attending: EMERGENCY MEDICINE
Payer: MEDICARE

## 2022-05-10 VITALS
DIASTOLIC BLOOD PRESSURE: 61 MMHG | HEART RATE: 97 BPM | WEIGHT: 274 LBS | BODY MASS INDEX: 44.22 KG/M2 | OXYGEN SATURATION: 98 % | RESPIRATION RATE: 20 BRPM | SYSTOLIC BLOOD PRESSURE: 173 MMHG | TEMPERATURE: 98 F

## 2022-05-10 DIAGNOSIS — J20.9 ACUTE BRONCHITIS, UNSPECIFIED ORGANISM: Primary | ICD-10-CM

## 2022-05-10 LAB
ALBUMIN SERPL-MCNC: 3.6 G/DL (ref 3.5–5)
ALBUMIN/GLOB SERPL: 1 {RATIO} (ref 1.1–2.2)
ALP SERPL-CCNC: 90 U/L (ref 45–117)
ALT SERPL-CCNC: 52 U/L (ref 12–78)
ANION GAP SERPL CALC-SCNC: 10 MMOL/L (ref 5–15)
APTT PPP: 25.6 SEC (ref 22.1–31)
AST SERPL-CCNC: 33 U/L (ref 15–37)
BASOPHILS # BLD: 0 K/UL (ref 0–0.1)
BASOPHILS NFR BLD: 1 % (ref 0–1)
BILIRUB SERPL-MCNC: 0.5 MG/DL (ref 0.2–1)
BNP SERPL-MCNC: 44 PG/ML (ref 0–125)
BUN SERPL-MCNC: 11 MG/DL (ref 6–20)
BUN/CREAT SERPL: 11 (ref 12–20)
CALCIUM SERPL-MCNC: 9.1 MG/DL (ref 8.5–10.1)
CHLORIDE SERPL-SCNC: 106 MMOL/L (ref 97–108)
CO2 SERPL-SCNC: 26 MMOL/L (ref 21–32)
COVID-19 RAPID TEST, COVR: NOT DETECTED
CREAT SERPL-MCNC: 0.97 MG/DL (ref 0.55–1.02)
DIFFERENTIAL METHOD BLD: NORMAL
EOSINOPHIL # BLD: 0.3 K/UL (ref 0–0.4)
EOSINOPHIL NFR BLD: 5 % (ref 0–7)
ERYTHROCYTE [DISTWIDTH] IN BLOOD BY AUTOMATED COUNT: 12.6 % (ref 11.5–14.5)
GLOBULIN SER CALC-MCNC: 3.7 G/DL (ref 2–4)
GLUCOSE SERPL-MCNC: 167 MG/DL (ref 65–100)
HCT VFR BLD AUTO: 46.4 % (ref 35–47)
HGB BLD-MCNC: 14.7 G/DL (ref 11.5–16)
IMM GRANULOCYTES # BLD AUTO: 0 K/UL (ref 0–0.04)
IMM GRANULOCYTES NFR BLD AUTO: 0 % (ref 0–0.5)
INR PPP: 1.1 (ref 0.9–1.1)
LYMPHOCYTES # BLD: 1.4 K/UL (ref 0.8–3.5)
LYMPHOCYTES NFR BLD: 26 % (ref 12–49)
MCH RBC QN AUTO: 28.7 PG (ref 26–34)
MCHC RBC AUTO-ENTMCNC: 31.7 G/DL (ref 30–36.5)
MCV RBC AUTO: 90.4 FL (ref 80–99)
MONOCYTES # BLD: 0.3 K/UL (ref 0–1)
MONOCYTES NFR BLD: 7 % (ref 5–13)
NEUTS SEG # BLD: 3.2 K/UL (ref 1.8–8)
NEUTS SEG NFR BLD: 61 % (ref 32–75)
NRBC # BLD: 0 K/UL (ref 0–0.01)
NRBC BLD-RTO: 0 PER 100 WBC
PLATELET # BLD AUTO: 200 K/UL (ref 150–400)
PMV BLD AUTO: 9.1 FL (ref 8.9–12.9)
POTASSIUM SERPL-SCNC: 3.7 MMOL/L (ref 3.5–5.1)
PROT SERPL-MCNC: 7.3 G/DL (ref 6.4–8.2)
PROTHROMBIN TIME: 11.1 SEC (ref 9–11.1)
RBC # BLD AUTO: 5.13 M/UL (ref 3.8–5.2)
SODIUM SERPL-SCNC: 142 MMOL/L (ref 136–145)
SOURCE, COVRS: NORMAL
THERAPEUTIC RANGE,PTTT: NORMAL SECS (ref 58–77)
WBC # BLD AUTO: 5.2 K/UL (ref 3.6–11)

## 2022-05-10 PROCEDURE — 71045 X-RAY EXAM CHEST 1 VIEW: CPT

## 2022-05-10 PROCEDURE — 36415 COLL VENOUS BLD VENIPUNCTURE: CPT

## 2022-05-10 PROCEDURE — 87635 SARS-COV-2 COVID-19 AMP PRB: CPT

## 2022-05-10 PROCEDURE — 85025 COMPLETE CBC W/AUTO DIFF WBC: CPT

## 2022-05-10 PROCEDURE — 83880 ASSAY OF NATRIURETIC PEPTIDE: CPT

## 2022-05-10 PROCEDURE — 85610 PROTHROMBIN TIME: CPT

## 2022-05-10 PROCEDURE — 99284 EMERGENCY DEPT VISIT MOD MDM: CPT

## 2022-05-10 PROCEDURE — 85730 THROMBOPLASTIN TIME PARTIAL: CPT

## 2022-05-10 PROCEDURE — 80053 COMPREHEN METABOLIC PANEL: CPT

## 2022-05-10 RX ORDER — DOXYCYCLINE HYCLATE 100 MG
100 TABLET ORAL 2 TIMES DAILY
Qty: 14 TABLET | Refills: 0 | Status: SHIPPED | OUTPATIENT
Start: 2022-05-10 | End: 2022-05-17

## 2022-05-10 NOTE — ED PROVIDER NOTES
HPI   The patient is a 71-year-old white female with a history of depression diabetes GERD high cholesterol elevated liver enzymes peptic ulcer disease who presents to the cute onset of a cough for about 1 week or so productive of some yellow sputum without fever and chills. She is concerned she might have pneumonia and has had such in the past.  Her sats here are about 99% on room air. She had 2 negative COVID test the last 1 earlier today.   Past Medical History:   Diagnosis Date    Depression     Diabetes ([address]     Gastrointestinal disorder     gerd    GERD (gastroesophageal reflux disease)     High cholesterol     Liver disease     elevated liver enzymes/fatty liver    Obese     uses CPAP    Other ill-defined conditions(799.89)     fibromyalgia    Psychiatric disorder     depression    PUD (peptic ulcer disease)     yrs ago    Sleep apnea     Thyroid disease        Past Surgical History:   Procedure Laterality Date    BIOPSY LIVER  2022    performed during gall bladder surgery, LBX done unknown to patient    COLONOSCOPY N/A 2022    COLONOSCOPY performed by Mehnaz Salgado MD at [address] [place] [address]       X 2    HX CHOLECYSTECTOMY      HX GYN      hysterectomy    HX LAP CHOLECYSTECTOMY  2021    HX SEPTOPLASTY      IR BX LIVER PERCUTANEOUS           Family History:   Problem Relation Age of Onset    Hypertension Mother     Heart Disease Father     Cancer Brother         ? where    Colon Cancer Maternal Grandmother     Cancer Maternal Grandmother         colon    Lung Cancer Maternal Grandfather     Emphysema Paternal Grandfather        Social History     Socioeconomic History    Marital status:      Spouse name: Not on file    Number of children: Not on file    Years of education: Not on file    Highest education level: Not on file   Occupational History    Not on file   Tobacco Use    Smoking status: Former Smoker    Smokeless tobacco: Never Used  Tobacco comment: Quit 16y ago. Vaping Use    Vaping Use: Never used   Substance and Sexual Activity    Alcohol use: Not Currently     Alcohol/week: 0.0 standard drinks     Comment: rarely     Drug use: Never    Sexual activity: Not Currently     Birth control/protection: None   Other Topics Concern    Not on file   Social History Narrative    Not on file     Social Determinants of Health     Financial Resource Strain:     Difficulty of Paying Living Expenses: Not on file   Food Insecurity:     Worried About Running Out of Food in the Last Year: Not on file    [place] of Food in the Last Year: Not on file   Transportation Needs:     Lack of Transportation (Medical): Not on file    Lack of Transportation (Non-Medical): Not on file   Physical Activity:     Days of Exercise per Week: Not on file    Minutes of Exercise per Session: Not on file   Stress:     Feeling of Stress : Not on file   Social Connections:     Frequency of Communication with Friends and Family: Not on file    Frequency of Social Gatherings with Friends and Family: Not on file    Attends Adventism Services: Not on file    Active Member of [address] [place] [address] or Organizations: Not on file    Attends Club or Organization Meetings: Not on file    Marital Status: Not on file   Intimate Partner Violence:     Fear of Current or Ex-Partner: Not on file    Emotionally Abused: Not on file    Physically Abused: Not on file    Sexually Abused: Not on file   Housing Stability:     Unable to Pay for Housing in the Last Year: Not on file    Number of [place] in the Last Year: Not on file    Unstable Housing in the Last Year: Not on file         ALLERGIES: Liraglutide, No allergy information available, No known drug allergies, and Pravastatin    Review of Systems   All other systems reviewed and are negative.       Vitals:    05/10/22 1630   BP: (!) 173/61   Pulse: 97   Resp: 20   Temp: 98 °F (36.7 °C)   SpO2: 98%   Weight: 124.3 kg (274 lb) Physical Exam  Vitals and nursing note reviewed. Constitutional:       Appearance: She is well-developed. Comments: Morbidly obese   HENT:      Head: Normocephalic and atraumatic. Mouth/Throat:      Pharynx: No oropharyngeal exudate. Eyes:      General: No scleral icterus. Conjunctiva/sclera: Conjunctivae normal.   Neck:      Thyroid: No thyromegaly. Cardiovascular:      Rate and Rhythm: Normal rate and regular rhythm. Heart sounds: Normal heart sounds. No murmur heard. No friction rub. No gallop. Pulmonary:      Effort: Pulmonary effort is normal. No respiratory distress. Breath sounds: Normal breath sounds. No stridor. No wheezing or rales. Abdominal:      General: Bowel sounds are normal.      Palpations: Abdomen is soft. Tenderness: There is no abdominal tenderness. There is no guarding or rebound. Musculoskeletal:         General: Normal range of motion. Cervical back: Neck supple. Lymphadenopathy:      Cervical: No cervical adenopathy. Skin:     General: Skin is warm and dry. Neurological:      Mental Status: She is alert and oriented to person, place, and time.           MDM       Procedures      Assessment and plan the patient's COVID was negative chest x-ray was clear pulse ox's were acceptable in the ED and other laboratory studies were normal will discharge with possible asthmatic bronchitis close follow-up by PCP

## 2022-05-10 NOTE — ED NOTES
SL dcd bleeding controlled dressing applied; Pt verbalized understanding of dc instructions, meds and FU.

## 2022-05-10 NOTE — ED NOTES
Pt c/o cough and congestion x 1 week; productive cough light green, nasal secretions clear; denied fever and chills.

## 2022-05-10 NOTE — ED TRIAGE NOTES
Pt ambulated to the treatment area with a steady gait. Pt states \"I started a cough and some shortness of breath a week ago. I did a covid test a few days ago and then again today and it was negative both times. I have not had fevers I noticed a wheeze today and I was concerned about pneumonia because I have had that in the past. Pt spo2 is 98% RA after walking.

## 2022-05-18 DIAGNOSIS — E03.9 ACQUIRED HYPOTHYROIDISM: ICD-10-CM

## 2022-05-19 ENCOUNTER — OFFICE VISIT (OUTPATIENT)
Dept: FAMILY MEDICINE CLINIC | Age: 67
End: 2022-05-19
Payer: MEDICARE

## 2022-05-19 ENCOUNTER — LAB ONLY (OUTPATIENT)
Dept: FAMILY MEDICINE CLINIC | Age: 67
End: 2022-05-19

## 2022-05-19 VITALS
WEIGHT: 272 LBS | RESPIRATION RATE: 20 BRPM | TEMPERATURE: 98.1 F | DIASTOLIC BLOOD PRESSURE: 78 MMHG | OXYGEN SATURATION: 98 % | BODY MASS INDEX: 43.71 KG/M2 | SYSTOLIC BLOOD PRESSURE: 150 MMHG | HEART RATE: 78 BPM | HEIGHT: 66 IN

## 2022-05-19 DIAGNOSIS — I10 ESSENTIAL HYPERTENSION: ICD-10-CM

## 2022-05-19 DIAGNOSIS — I10 ESSENTIAL HYPERTENSION: Primary | ICD-10-CM

## 2022-05-19 DIAGNOSIS — R73.03 PREDIABETES: ICD-10-CM

## 2022-05-19 DIAGNOSIS — Z00.00 MEDICARE ANNUAL WELLNESS VISIT, SUBSEQUENT: ICD-10-CM

## 2022-05-19 DIAGNOSIS — F33.1 MODERATE EPISODE OF RECURRENT MAJOR DEPRESSIVE DISORDER (HCC): ICD-10-CM

## 2022-05-19 PROCEDURE — G8400 PT W/DXA NO RESULTS DOC: HCPCS | Performed by: NURSE PRACTITIONER

## 2022-05-19 PROCEDURE — G8753 SYS BP > OR = 140: HCPCS | Performed by: NURSE PRACTITIONER

## 2022-05-19 PROCEDURE — G9899 SCRN MAM PERF RSLTS DOC: HCPCS | Performed by: NURSE PRACTITIONER

## 2022-05-19 PROCEDURE — G0463 HOSPITAL OUTPT CLINIC VISIT: HCPCS | Performed by: NURSE PRACTITIONER

## 2022-05-19 PROCEDURE — G0439 PPPS, SUBSEQ VISIT: HCPCS | Performed by: NURSE PRACTITIONER

## 2022-05-19 PROCEDURE — G8536 NO DOC ELDER MAL SCRN: HCPCS | Performed by: NURSE PRACTITIONER

## 2022-05-19 PROCEDURE — 99214 OFFICE O/P EST MOD 30 MIN: CPT | Performed by: NURSE PRACTITIONER

## 2022-05-19 PROCEDURE — 1101F PT FALLS ASSESS-DOCD LE1/YR: CPT | Performed by: NURSE PRACTITIONER

## 2022-05-19 PROCEDURE — 1090F PRES/ABSN URINE INCON ASSESS: CPT | Performed by: NURSE PRACTITIONER

## 2022-05-19 PROCEDURE — G9717 DOC PT DX DEP/BP F/U NT REQ: HCPCS | Performed by: NURSE PRACTITIONER

## 2022-05-19 PROCEDURE — G8417 CALC BMI ABV UP PARAM F/U: HCPCS | Performed by: NURSE PRACTITIONER

## 2022-05-19 PROCEDURE — G8427 DOCREV CUR MEDS BY ELIG CLIN: HCPCS | Performed by: NURSE PRACTITIONER

## 2022-05-19 PROCEDURE — G8754 DIAS BP LESS 90: HCPCS | Performed by: NURSE PRACTITIONER

## 2022-05-19 PROCEDURE — 3017F COLORECTAL CA SCREEN DOC REV: CPT | Performed by: NURSE PRACTITIONER

## 2022-05-19 RX ORDER — ESCITALOPRAM OXALATE 10 MG/1
10 TABLET ORAL DAILY
Qty: 90 TABLET | Refills: 1 | Status: SHIPPED | OUTPATIENT
Start: 2022-05-19 | End: 2022-10-28 | Stop reason: SDUPTHER

## 2022-05-19 RX ORDER — AMLODIPINE BESYLATE 5 MG/1
5 TABLET ORAL
Qty: 90 TABLET | Refills: 1 | Status: SHIPPED | OUTPATIENT
Start: 2022-05-19 | End: 2022-10-28 | Stop reason: SDUPTHER

## 2022-05-19 RX ORDER — DOXEPIN HYDROCHLORIDE 25 MG/1
CAPSULE ORAL
Qty: 90 CAPSULE | Refills: 1 | Status: SHIPPED | OUTPATIENT
Start: 2022-05-19

## 2022-05-19 NOTE — PROGRESS NOTES
Date of visit: 2022       This is a Subsequent Medicare Annual Wellness Visit (AWV), (Performed more than 12 months after effective date of Medicare Part B enrollment and 12 months after last preventive visit, Once in a lifetime)    I have reviewed the patient's medical history in detail and updated the computerized patient record. Gage Dhaliwal is a 73 y.o. female   History obtained from: the patient. Concerns today   (Patient understands that medical problems addressed today may incur additional cost as this is a preventive visit)    History     Patient Active Problem List   Diagnosis Code    Hypothyroidism E03.9    Depression F32. A    GERD K21.9    Fibromyalgia M79.7    Obesity E66.9    MOFFETT (nonalcoholic steatohepatitis) K75.81    Lung nodule seen on imaging study R91.1    Sleep apnea G47.30    Essential hypertension I10    Hypercholesterolemia E78.00    S/P TOMASA (total abdominal hysterectomy) Z90.710    Type II diabetes mellitus (HCC) E11.9    Obesity, morbid (HCC) E66.01    Growth hormone deficiency (HCC) E23.0    Vitamin D deficiency E55.9     Past Medical History:   Diagnosis Date    Depression     Diabetes ([address]     Gastrointestinal disorder     gerd    GERD (gastroesophageal reflux disease)     High cholesterol     Liver disease     elevated liver enzymes/fatty liver    Obese     uses CPAP    Other ill-defined conditions(799.89)     fibromyalgia    Psychiatric disorder     depression    PUD (peptic ulcer disease)     yrs ago    Sleep apnea     Thyroid disease       Past Surgical History:   Procedure Laterality Date    BIOPSY LIVER  2022    performed during gall bladder surgery, LBX done unknown to patient    COLONOSCOPY N/A 2022    COL[facility]ed [facility]    DELIVERY       X 2    HX CHOLECYSTECTOMY      HX GYN      hysterectomy    HX LAP CHOLECYSTECTOMY  2021    HX SEPTOPLASTY      IR BX LIVER PERCUTANEOUS       Allergies   Allergen Reactions    Liraglutide Nausea and Vomiting    No Allergy Information Available Other (comments)     Pt. Is allergic to Liraglutide    No Known Drug Allergies Unknown (comments)     Pt. Is allergic to Liraglutide    Pravastatin Other (comments)     Joint pain. Current Outpatient Medications   Medication Sig Dispense Refill    doxepin (SINEquan) 25 mg capsule TAKE ONE CAPSULE BY MOUTH EVERY NIGHT AT BEDTIME 90 Capsule 1    amLODIPine (NORVASC) 5 mg tablet Take 1 Tablet by mouth nightly. 90 Tablet 1    escitalopram oxalate (LEXAPRO) 10 mg tablet Take 1 Tablet by mouth daily. 90 Tablet 1    Unithroid 200 mcg tablet Take one tablet daily on an empty stomach 80 Tablet 1     Family History   Problem Relation Age of Onset    Hypertension Mother     Heart Disease Father     Cancer Brother         ? where    Colon Cancer Maternal Grandmother     Cancer Maternal Grandmother         colon    Lung Cancer Maternal Grandfather     Emphysema Paternal Grandfather      Social History     Tobacco Use    Smoking status: Former Smoker    Smokeless tobacco: Never Used    Tobacco comment: Quit 16y ago. Substance Use Topics    Alcohol use: Not Currently     Alcohol/week: 0.0 standard drinks     Comment: rarely        Specialists/Care Team   Caitlin Barron has established care with the following healthcare providers:  Patient Care Team:  Annel Epps NP as PCP - General (Family Nurse Practitioner)  Annel Epps NP as PCP - [address] [place] [address] [facility]  Raymon Lorenz MD (Gastroenterology)  Leon Bhat MD (Family Medicine)  Jose Alberto Klein MD (Endocrinology Physician)    Health Risk Assessment     Demographics   female  73 y.o.     General Health Questions   -During the past 4 weeks:   -how would you rate your health in general? Poor   -how often have you been bothered by feeling dizzy when standing up? occasionally   -how much have you been bothered by bodily pain? moderately   -Have you noticed any hearing difficulties? no   -has your physical and emotional health limited your social activities with family or friends? no    Emotional Health Questions   -Do you have a history of depression, anxiety, or emotional problems? yes  -Over the past 2 weeks, have you felt down, depressed or hopeless? yes  -Over the past 2 weeks, have you felt little interest or pleasure in doing things? yes    Health Habits   Please describe your diet habits:   Do you get 5 servings of fruits or vegetables daily? no  Do you exercise regularly? no    Activities of Daily Living and Functional Status   -Do you need help with eating, walking, dressing, bathing, toileting, the phone, transportation, shopping, preparing meals, housework, laundry, medications or managing money? no  -In the past four weeks, was someone available to help you if you needed and wanted help with anything? yes  -Are you confident are you that you can control and manage most of your health problems? yes  -Have you been given information to help you keep track of your medications? yes  -How often do you have trouble taking your medications as prescribed? never    Fall Risk and Home Safety   Have you fallen 2 or more times in the past year? no  Does your home have rugs in the hallway, lack grab bars in the bathroom, lack handrails on the stairs or have poor lighting? no  Do you have smoke detectors and check them regularly? yes  Do you have difficulties driving a car? no  Do you always fasten your seat belt when you are in a car? yes    Review of Systems (if indicated for problems addressed today)       Physical Examination     Vitals:    05/19/22 1100   BP: (!) 150/78   Pulse: 78   Resp: 20   Temp: 98.1 °F (36.7 °C)   TempSrc: Temporal   SpO2: 98%   Weight: 272 lb (123.4 kg)   Height: 5' 6\" (1.676 m)     Body mass index is 43.9 kg/m².    No exam data present  Was the patient's timed Up & Go test unsteady or longer than 30 seconds? no    Evaluation of Cognitive Function   Mood/affect:  happy  Orientation: Person, Place, Time and Situation  Appearance: age appropriate  Family member/caregiver input: none    Additional exam if indicated for problems addressed today:    Advice/Referrals/Counseling (as indicated)   Education and counseling provided for any problems identified above:     Preventive Services     (Preventive care checklist to be included in patient instructions)  Discussed today Done Previously Not Needed       Pneumococcal vaccines      Flu vaccine      Hepatitis B vaccine (if at risk)      Shingles vaccine      TDAP vaccine      Mammogram      Pap smear      Colorectal cancer screening      Low-dose CT for lung cancer screening      Bone density test      Glaucoma screening   x   Cholesterol test   x   Diabetes screening test       Diabetes self-management class      Nutritionist referral for diabetes or renal disease     Discussion of Advance Directive   Discussed with Vidya Erickson her ability to prepare and advance directive in the case that an injury or illness causes her to be unable to make health care decisions. Assessment/Plan   Z00.00    ICD-10-CM ICD-9-CM    1. Moderate episode of recurrent major depressive disorder (HCC)  F33.1 296.32 doxepin (SINEquan) 25 mg capsule      escitalopram oxalate (LEXAPRO) 10 mg tablet   2. Essential hypertension  I10 401.9 amLODIPine (NORVASC) 5 mg tablet      CBC W/O DIFF      METABOLIC PANEL, COMPREHENSIVE      LIPID PANEL   3.  Prediabetes  R73.03 790.29 HEMOGLOBIN A1C WITH EAG      MICROALBUMIN, UR, RAND W/ MICROALB/CREAT RATIO       Orders Placed This Encounter    CBC W/O DIFF    METABOLIC PANEL, COMPREHENSIVE    HEMOGLOBIN A1C WITH EAG    LIPID PANEL    MICROALBUMIN, UR, RAND W/ MICROALB/CREAT RATIO    doxepin (SINEquan) 25 mg capsule    amLODIPine (NORVASC) 5 mg tablet    escitalopram oxalate (LEXAPRO) 10 mg tablet           Margarita Vazquez NP  Subjective:     Amy Hardin is a 73 y.o. female who presents for follow up of diabetes and hypertension. Diet and Lifestyle: generally follows a low fat low cholesterol diet  Home BP Monitoring: is not measured at home    Cardiovascular ROS: not taking medications regularly as instructed, no medication side effects noted, no TIA's, no chest pain on exertion, no dyspnea on exertion, no swelling of ankles. New concerns: Pt is here for follow up and fasting labs. She has been out of all medications for some time. BP is noted to be elevated  In addition, depression is present, but she has been without medications for some time. She is interested in restarting her medications at this time. She denies suicidal ideations. .     Patient Active Problem List    Diagnosis Date Noted    Growth hormone deficiency ([facility] 03/18/2021    Vitamin D deficiency 03/18/2021    Obesity, morbid ([facility] 06/28/2018    S/P TOMASA (total abdominal hysterectomy) 04/19/2016    Type II diabetes mellitus ([facility] 04/19/2016    Essential hypertension 03/23/2016    Hypercholesterolemia 03/23/2016    Sleep apnea 07/01/2014    MOFFETT (nonalcoholic steatohepatitis) 06/28/2014    Lung nodule seen on imaging study 06/28/2014    Obesity 09/05/2011    Hypothyroidism 09/04/2011    Depression 09/04/2011    GERD 09/04/2011    Fibromyalgia 09/04/2011     Current Outpatient Medications   Medication Sig Dispense Refill    doxepin (SINEquan) 25 mg capsule TAKE ONE CAPSULE BY MOUTH EVERY NIGHT AT BEDTIME 90 Capsule 1    amLODIPine (NORVASC) 5 mg tablet Take 1 Tablet by mouth nightly. 90 Tablet 1    escitalopram oxalate (LEXAPRO) 10 mg tablet Take 1 Tablet by mouth daily. 90 Tablet 1    Unithroid 200 mcg tablet Take one tablet daily on an empty stomach 90 Tablet 1     Allergies   Allergen Reactions    Liraglutide Nausea and Vomiting    No Allergy Information Available Other (comments)     Pt.  Is allergic to Liraglutide    No Known Drug Allergies Unknown (comments)     Pt. Is allergic to Liraglutide    Pravastatin Other (comments)     Joint pain. Past Medical History:   Diagnosis Date    Depression     Diabetes ([address]     Gastrointestinal disorder     gerd    GERD (gastroesophageal reflux disease)     High cholesterol     Liver disease     elevated liver enzymes/fatty liver    Obese     uses CPAP    Other ill-defined conditions(799.89)     fibromyalgia    Psychiatric disorder     depression    PUD (peptic ulcer disease)     yrs ago    Sleep apnea     Thyroid disease      Past Surgical History:   Procedure Laterality Date    BIOPSY LIVER  2022    performed during gall bladder surgery, LBX done unknown to patient    COLONOSCOPY N/A 2022    COLONOSCOPY performed by Hallie Dominguez MD at [address] [place] [address]       X 2    HX CHOLECYSTECTOMY      HX GYN      hysterectomy    HX LAP CHOLECYSTECTOMY  2021    HX SEPTOPLASTY      IR BX LIVER PERCUTANEOUS       Family History   Problem Relation Age of Onset    Hypertension Mother     Heart Disease Father     Cancer Brother         ? where    Colon Cancer Maternal Grandmother     Cancer Maternal Grandmother         colon    Lung Cancer Maternal Grandfather     Emphysema Paternal Grandfather      Social History     Tobacco Use    Smoking status: Former Smoker    Smokeless tobacco: Never Used    Tobacco comment: Quit 16y ago.    Substance Use Topics    Alcohol use: Not Currently     Alcohol/week: 0.0 standard drinks     Comment: rarely         Lab Results   Component Value Date/Time    WBC 5.2 05/10/2022 04:45 PM    HGB 14.7 05/10/2022 04:45 PM    Hemoglobin (POC) 13.9 2011 10:09 AM    HCT 46.4 05/10/2022 04:45 PM    Hematocrit (POC) 41 2011 10:09 AM    PLATELET 284  04:45 PM    MCV 90.4 05/10/2022 04:45 PM     Lab Results   Component Value Date/Time    Hemoglobin A1c 6.2 (H) 03/10/2022 09:58 AM    Hemoglobin A1c 6.4 (H) 03/18/2021 12:00 AM    Hemoglobin A1c 5.8 (H) 06/05/2020 08:47 AM    Glucose 167 (H) 05/10/2022 04:45 PM    Glucose (POC) 101 (H) 01/21/2016 09:47 AM    Microalb/Creat ratio (ug/mg creat.) 4.1 06/26/2017 08:32 AM    LDL, calculated 130 (H) 03/18/2021 12:00 AM    LDL, calculated 123 (H) 04/15/2019 11:05 AM    Creatinine (POC) 1.0 09/04/2011 10:09 AM    Creatinine 0.97 05/10/2022 04:45 PM      Lab Results   Component Value Date/Time    Cholesterol, total 198 03/18/2021 12:00 AM    HDL Cholesterol 38 (L) 03/18/2021 12:00 AM    LDL, calculated 130 (H) 03/18/2021 12:00 AM    LDL, calculated 123 (H) 04/15/2019 11:05 AM    Triglyceride 166 (H) 03/18/2021 12:00 AM    CHOL/HDL Ratio 3.8 06/29/2014 01:00 AM        Review of Systems, additional:  Pertinent items are noted in HPI. Objective:     Visit Vitals  BP (!) 150/78 (BP 1 Location: Right arm, BP Patient Position: Sitting, BP Cuff Size: Adult)   Pulse 78   Temp 98.1 °F (36.7 °C) (Temporal)   Resp 20   Ht 5' 6\" (1.676 m)   Wt 272 lb (123.4 kg)   SpO2 98%   BMI 43.90 kg/m²     Appearance: alert, well appearing, and in no distress. General exam: CVS exam BP noted to be moderately elevated today in office, S1, S2 normal, no gallop, no murmur, chest clear, no JVD, no HSM, no edema. Lab review: orders written for new lab studies as appropriate; see orders. Assessment/Plan:     diabetes control uncertain, hypertension control uncertain. lab results and schedule of future lab studies reviewed with patient. ICD-10-CM ICD-9-CM    1. Essential hypertension  I10 401.9 amLODIPine (NORVASC) 5 mg tablet      CBC W/O DIFF      METABOLIC PANEL, COMPREHENSIVE      LIPID PANEL   2. Moderate episode of recurrent major depressive disorder (HCC)  F33.1 296.32 doxepin (SINEquan) 25 mg capsule      escitalopram oxalate (LEXAPRO) 10 mg tablet   3.  Prediabetes  R73.03 790.29 HEMOGLOBIN A1C WITH EAG      MICROALBUMIN, UR, RAND W/ MICROALB/CREAT RATIO   4. Medicare annual wellness visit, subsequent  Z00.00 V70.0      Medications refilled  Educated that we will notify when labs return, and inform her of any change in plan of care at that time  Educated about returning to office should symptoms persist after re-starting all of her medications    Pt informed to return to office with worsening of symptoms, or PRN with any questions or concerns. Pt verbalizes understanding of plan of care and denies further questions or concerns at this time.

## 2022-05-19 NOTE — PROGRESS NOTES
Identified pt with two pt identifiers(name and ).     Chief Complaint   Patient presents with    Establish Care    Medication Refill    Depression        Health Maintenance Due   Topic    COVID-19 Vaccine (1)    Pneumococcal 65+ years (1 - PCV)    Shingrix Vaccine Age 50> (1 of 2)    Foot Exam Q1     MICROALBUMIN Q1     Bone Densitometry (Dexa) Screening     Medicare Yearly Exam     Lipid Screen        Wt Readings from Last 3 Encounters:   22 272 lb (123.4 kg)   05/10/22 274 lb (124.3 kg)   22 287 lb 9.6 oz (130.5 kg)     Temp Readings from Last 3 Encounters:   22 98.1 °F (36.7 °C) (Temporal)   05/10/22 98 °F (36.7 °C)   03/10/22 (!) 96.6 °F (35.9 °C) (Temporal)     BP Readings from Last 3 Encounters:   22 (!) 150/78   05/10/22 (!) 173/61   22 (!) 145/67     Pulse Readings from Last 3 Encounters:   22 78   05/10/22 97   03/10/22 65         Learning Assessment:  :     Learning Assessment 2014   PRIMARY LEARNER Patient   HIGHEST LEVEL OF EDUCATION - PRIMARY LEARNER  DID NOT GRADUATE HIGH SCHOOL   BARRIERS PRIMARY LEARNER NONE   CO-LEARNER CAREGIVER No   PRIMARY LANGUAGE ENGLISH   LEARNER PREFERENCE PRIMARY OTHER (COMMENT)   ANSWERED BY patient   RELATIONSHIP SELF       Depression Screening:  :     3 most recent PHQ Screens 2022   PHQ Not Done -   Little interest or pleasure in doing things Nearly every day   Feeling down, depressed, irritable, or hopeless More than half the days   Total Score PHQ 2 5   Trouble falling or staying asleep, or sleeping too much Several days   Feeling tired or having little energy Nearly every day   Poor appetite, weight loss, or overeating Not at all   Feeling bad about yourself - or that you are a failure or have let yourself or your family down Nearly every day   Trouble concentrating on things such as school, work, reading, or watching TV More than half the days   Moving or speaking so slowly that other people could have noticed; or the opposite being so fidgety that others notice Not at all   Thoughts of being better off dead, or hurting yourself in some way More than half the days   PHQ 9 Score 16   How difficult have these problems made it for you to do your work, take care of your home and get along with others Very difficult       Fall Risk Assessment:  :     Fall Risk Assessment, last 12 mths 3/10/2022   Able to walk? Yes   Fall in past 12 months? 0   Do you feel unsteady? 0   Are you worried about falling 0       Abuse Screening:  :     Abuse Screening Questionnaire 5/19/2022 3/18/2021 1/8/2020 10/1/2018 10/11/2017 10/10/2016 5/13/2015   Do you ever feel afraid of your partner? N N N N N N N   Are you in a relationship with someone who physically or mentally threatens you? N N N N N N N   Is it safe for you to go home? Y Y Y Y Y Y Y       Coordination of Care Questionnaire:  :     1) Have you been to an emergency room, urgent care clinic since your last visit? yes [facility] for Res[facility] problems last week  Hospitalized since your last visit? no             2) Have you seen or consulted any other health care providers outside of [address] [place] [address] since your last visit? yes  Dr. Veroan Oh takes care of thyroid and Dr. iLlibeth Lowery takes care of liver (Include any pap smears or colon screenings in this section.)    3) Do you have an Advance Directive on file? no  Are you interested in receiving information about Advance Directives? no    Patient is accompanied by N/A I have received verbal consent from Eleazar Mcclendon to discuss any/all medical information while they are present in the room. 4.  For patients aged 39-70: Has the patient had a colonoscopy / FIT/ Cologuard? Yes - no Care Gap present      If the patient is female:    5. For patients aged 41-77: Has the patient had a mammogram within the past 2 years? Yes - no Care Gap present      6. For patients aged 21-65: Has the patient had a pap smear?  No

## 2022-05-20 ENCOUNTER — TELEPHONE (OUTPATIENT)
Dept: FAMILY MEDICINE CLINIC | Age: 67
End: 2022-05-20

## 2022-05-20 LAB
ALBUMIN SERPL-MCNC: 3.5 G/DL (ref 3.5–5)
ALBUMIN/GLOB SERPL: 1.1 {RATIO} (ref 1.1–2.2)
ALP SERPL-CCNC: 77 U/L (ref 45–117)
ALT SERPL-CCNC: 47 U/L (ref 12–78)
ANION GAP SERPL CALC-SCNC: 6 MMOL/L (ref 5–15)
AST SERPL-CCNC: 48 U/L (ref 15–37)
BILIRUB SERPL-MCNC: 0.6 MG/DL (ref 0.2–1)
BUN SERPL-MCNC: 10 MG/DL (ref 6–20)
BUN/CREAT SERPL: 11 (ref 12–20)
CALCIUM SERPL-MCNC: 9.1 MG/DL (ref 8.5–10.1)
CHLORIDE SERPL-SCNC: 109 MMOL/L (ref 97–108)
CHOLEST SERPL-MCNC: 176 MG/DL
CO2 SERPL-SCNC: 27 MMOL/L (ref 21–32)
CREAT SERPL-MCNC: 0.88 MG/DL (ref 0.55–1.02)
CREAT UR-MCNC: 194 MG/DL
ERYTHROCYTE [DISTWIDTH] IN BLOOD BY AUTOMATED COUNT: 12.6 % (ref 11.5–14.5)
EST. AVERAGE GLUCOSE BLD GHB EST-MCNC: 114 MG/DL
GLOBULIN SER CALC-MCNC: 3.3 G/DL (ref 2–4)
GLUCOSE SERPL-MCNC: 126 MG/DL (ref 65–100)
HBA1C MFR BLD: 5.6 % (ref 4–5.6)
HCT VFR BLD AUTO: 42.8 % (ref 35–47)
HDLC SERPL-MCNC: 41 MG/DL
HDLC SERPL: 4.3 {RATIO} (ref 0–5)
HGB BLD-MCNC: 13.1 G/DL (ref 11.5–16)
LDLC SERPL CALC-MCNC: 109.2 MG/DL (ref 0–100)
MCH RBC QN AUTO: 29.8 PG (ref 26–34)
MCHC RBC AUTO-ENTMCNC: 30.6 G/DL (ref 30–36.5)
MCV RBC AUTO: 97.5 FL (ref 80–99)
MICROALBUMIN UR-MCNC: 0.91 MG/DL
MICROALBUMIN/CREAT UR-RTO: 5 MG/G (ref 0–30)
NRBC # BLD: 0 K/UL (ref 0–0.01)
NRBC BLD-RTO: 0 PER 100 WBC
PLATELET # BLD AUTO: 224 K/UL (ref 150–400)
PMV BLD AUTO: 9.8 FL (ref 8.9–12.9)
POTASSIUM SERPL-SCNC: 4.2 MMOL/L (ref 3.5–5.1)
PROT SERPL-MCNC: 6.8 G/DL (ref 6.4–8.2)
RBC # BLD AUTO: 4.39 M/UL (ref 3.8–5.2)
SODIUM SERPL-SCNC: 142 MMOL/L (ref 136–145)
TRIGL SERPL-MCNC: 129 MG/DL (ref ?–150)
VLDLC SERPL CALC-MCNC: 25.8 MG/DL
WBC # BLD AUTO: 3.8 K/UL (ref 3.6–11)

## 2022-05-20 NOTE — TELEPHONE ENCOUNTER
----- Message from Emelyn Santoro NP sent at 5/20/2022 10:04 AM EDT -----  Please call patient and let her know that her labs returned stable  Thanks

## 2022-06-20 ENCOUNTER — OFFICE VISIT (OUTPATIENT)
Dept: ENDOCRINOLOGY | Age: 67
End: 2022-06-20
Payer: MEDICARE

## 2022-06-20 VITALS
HEART RATE: 75 BPM | SYSTOLIC BLOOD PRESSURE: 147 MMHG | BODY MASS INDEX: 44.03 KG/M2 | WEIGHT: 274 LBS | DIASTOLIC BLOOD PRESSURE: 75 MMHG | HEIGHT: 66 IN

## 2022-06-20 DIAGNOSIS — E03.9 ACQUIRED HYPOTHYROIDISM: Primary | ICD-10-CM

## 2022-06-20 DIAGNOSIS — E23.0 ADULT GROWTH HORMONE DEFICIENCY (HCC): ICD-10-CM

## 2022-06-20 PROCEDURE — 1123F ACP DISCUSS/DSCN MKR DOCD: CPT | Performed by: INTERNAL MEDICINE

## 2022-06-20 PROCEDURE — 1090F PRES/ABSN URINE INCON ASSESS: CPT | Performed by: INTERNAL MEDICINE

## 2022-06-20 PROCEDURE — G9717 DOC PT DX DEP/BP F/U NT REQ: HCPCS | Performed by: INTERNAL MEDICINE

## 2022-06-20 PROCEDURE — G8417 CALC BMI ABV UP PARAM F/U: HCPCS | Performed by: INTERNAL MEDICINE

## 2022-06-20 PROCEDURE — G8754 DIAS BP LESS 90: HCPCS | Performed by: INTERNAL MEDICINE

## 2022-06-20 PROCEDURE — 1101F PT FALLS ASSESS-DOCD LE1/YR: CPT | Performed by: INTERNAL MEDICINE

## 2022-06-20 PROCEDURE — G8427 DOCREV CUR MEDS BY ELIG CLIN: HCPCS | Performed by: INTERNAL MEDICINE

## 2022-06-20 PROCEDURE — G8536 NO DOC ELDER MAL SCRN: HCPCS | Performed by: INTERNAL MEDICINE

## 2022-06-20 PROCEDURE — G8753 SYS BP > OR = 140: HCPCS | Performed by: INTERNAL MEDICINE

## 2022-06-20 PROCEDURE — G9899 SCRN MAM PERF RSLTS DOC: HCPCS | Performed by: INTERNAL MEDICINE

## 2022-06-20 PROCEDURE — 99214 OFFICE O/P EST MOD 30 MIN: CPT | Performed by: INTERNAL MEDICINE

## 2022-06-20 PROCEDURE — 3017F COLORECTAL CA SCREEN DOC REV: CPT | Performed by: INTERNAL MEDICINE

## 2022-06-20 PROCEDURE — G8400 PT W/DXA NO RESULTS DOC: HCPCS | Performed by: INTERNAL MEDICINE

## 2022-06-20 NOTE — PROGRESS NOTES
Chief Complaint   Patient presents with    Thyroid Problem       * Since Last Visit:  -3/18/2022     We increased 175 to 200mcg  Not as tired (still tired)  Also has liver issue so hard to tell  Takes correctly, no missed doses  Is on brand    General:  From initial visit 3/18/22  Recent TSH is high   Is dealing with a liver condition - can be forgetful and confused - but does not think missed enough to cause the high TSH  Maybe only missed one once in awhile   Wt based is 206, is on 175mcg  Takes in AM with water, waits to eat 30 min or more  No Fe, Ca or MTV   No new meds  Did have choly 7/21  -  Was on nordotropin - got $3000 from them but only helped the 1st Rx of the year   Co-pay was $300 for 30 days after that   Has been off [email or web address] since 7/21  Went on medicare 8/21     Thyroid Symptoms  **has decreased energy** and **has increased energy**(since dose change), denies change in weight, denies change in appetite, denies sleep issues, denies hair changes, **has dry skin**,-improved, denies sweats, denies hot/cold intolerance, denies tremor, denies palpitations, denies change in bowels, no change in menses and is menopausal, **has depression**, **has anxiety**, **has irritability** -- mood changes are better    Neck Symptoms  denies dysphagia, denies anterior neck pain, denies neck swelling, notes no nodules    Labs/Studies    6/5/20 TSH 0.052, FT4 1.88H  7/20/20 IGF-1 19  12/29/20 IGF-1 49, TSH 0.6  5/25/21 TSH 1.3, IGF-1 77      Lab Results   Component Value Date/Time    TSH 45.70 (H) 03/10/2022 09:58 AM    TSH 0.052 (L) 06/05/2020 08:47 AM    TSH 32.340 (H) 01/08/2020 12:00 AM    TSH 8.520 (H) 04/15/2019 11:05 AM    TSH 0.103 (L) 10/02/2018 09:19 AM    TSH 0.772 11/17/2017 10:49 AM    TSH 0.984 06/26/2017 08:32 AM    TSH 0.016 (L) 10/25/2016 09:37 AM    TSH 1.980 05/10/2016 10:35 AM    TSH 31.170 (H) 11/19/2015 10:21 AM    TSH 4.530 (H) 04/08/2015 12:00 AM    TSH 3.330 07/01/2014 03:14 PM    TSH 5.72 (H) 06/28/2014 12:05 PM    TSH 0.67 09/06/2011 03:15 AM        Past Medical History:   Diagnosis Date    Depression     Diabetes ([facility]     Gastrointestinal disorder     gerd    GERD (gastroesophageal reflux disease)     High cholesterol     Liver disease     elevated liver enzymes/fatty liver    Obese     uses CPAP    Other ill-defined conditions(799.89)     fibromyalgia    Psychiatric disorder     depression    PUD (peptic ulcer disease)     yrs ago    Sleep apnea     Thyroid disease         Blood pressure (!) 147/75, pulse 75, height 5' 6\" (1.676 m), weight 274 lb (124.3 kg). Weight Metrics 6/20/2022 5/19/2022 5/10/2022 3/18/2022 3/10/2022 2/4/2022 5/18/2021   Weight 274 lb 272 lb 274 lb 287 lb 9.6 oz 284 lb 275 lb 12.7 oz 266 lb   BMI 44.22 kg/m2 43.9 kg/m2 44.22 kg/m2 46.42 kg/m2 45.84 kg/m2 44.51 kg/m2 42.93 kg/m2        EXAM:  - not done today    Assessment/Plan:   1. Acquired hypothyroidism  Increased from 175 to 200mcg T4  On brand, takes correctly  Feels better on higher dose, no hyper Sx  Check levels and adjust prn    2. Adult growth hormone deficiency ([facility]  Unable to afford HGH (was on Norditropin FlexPro 5mg/1.5ml (3.3mg/ml) 0.6mg per day)  Advised have a patient using the [employer] to get it for free (nortropin)- she will look into it  The Norditropin did help out some but was not enough (only covered 1st Rx of the year)  Also on medicare now - possible they may cover, but likely would put in donut hole    No orders of the defined types were placed in this encounter. Follow-up and Dispositions    · Return for --- follow up to be determined once labs are back.

## 2022-06-21 LAB — TSH SERPL DL<=0.005 MIU/L-ACNC: 0.01 UIU/ML (ref 0.45–4.5)

## 2022-07-08 ENCOUNTER — TELEPHONE (OUTPATIENT)
Dept: ENDOCRINOLOGY | Age: 67
End: 2022-07-08

## 2022-07-08 DIAGNOSIS — E03.9 ACQUIRED HYPOTHYROIDISM: Primary | ICD-10-CM

## 2022-07-08 NOTE — TELEPHONE ENCOUNTER
I called Ms. Mortimer Mustard and relayed the message from Dr. Francisca Ko. She understood this information and said she would do as instructed.   Laxmi De Guzman

## 2022-07-08 NOTE — TELEPHONE ENCOUNTER
7/8/2022  11:56 AM      Pt called and stated she had blood work done several weeks ago but have not gotten a call to go over her results. Pt stated she needs to know the results of her blood work so she will know when to schedule her next visit. PY#[phone]    Thanks,  Ashley Dickerson

## 2022-07-08 NOTE — PROGRESS NOTES
Let know thyroid labs shows slight over-treatment after going from 175 to 200mcg so looks like we need a dose in between  So take 175mcg every day except Sundays take 1.5 tablets

## 2022-07-08 NOTE — TELEPHONE ENCOUNTER
I returned this call and relayed the message from Dr. Alise Verduzco. Ms. Ilya Bell understood this information and said she is taking the 200 mcg tabs so she will do as instructed. She did want to know when she was supposed to come back. I told her I would ask Dr. Alise Verduzco and give her a call back with that answer.   Cristofer

## 2022-07-08 NOTE — TELEPHONE ENCOUNTER
----- Message from Steffany Jaquez MD sent at 7/8/2022  3:11 PM EDT -----  Let know thyroid labs shows slight over-treatment after going from 175 to 200mcg so looks like we need a dose in between  So take 175mcg every day except Sundays take 1.5 tablets

## 2022-07-08 NOTE — TELEPHONE ENCOUNTER
Sent lab ltr so ignore that one    Let know thyroid labs shows slight over-treatment after going from 175 to 200mcg so looks like we need a dose in between  So take 200mcg every day except Sundays take 1/2 or 0.5 tablets    If still has 175mcg can also take 175mcg every day except Sundays take 1.5 tablets

## 2022-07-12 ENCOUNTER — OFFICE VISIT (OUTPATIENT)
Dept: HEMATOLOGY | Age: 67
End: 2022-07-12
Payer: MEDICARE

## 2022-07-12 VITALS
DIASTOLIC BLOOD PRESSURE: 63 MMHG | HEART RATE: 89 BPM | SYSTOLIC BLOOD PRESSURE: 127 MMHG | OXYGEN SATURATION: 96 % | HEIGHT: 66 IN | BODY MASS INDEX: 44.65 KG/M2 | TEMPERATURE: 97.1 F | WEIGHT: 277.8 LBS

## 2022-07-12 DIAGNOSIS — K75.81 NASH (NONALCOHOLIC STEATOHEPATITIS): Primary | ICD-10-CM

## 2022-07-12 DIAGNOSIS — E03.9 ACQUIRED HYPOTHYROIDISM: ICD-10-CM

## 2022-07-12 DIAGNOSIS — E66.01 OBESITY, CLASS III, BMI 40-49.9 (MORBID OBESITY) (HCC): ICD-10-CM

## 2022-07-12 PROCEDURE — G9717 DOC PT DX DEP/BP F/U NT REQ: HCPCS | Performed by: PHYSICIAN ASSISTANT

## 2022-07-12 PROCEDURE — G8536 NO DOC ELDER MAL SCRN: HCPCS | Performed by: PHYSICIAN ASSISTANT

## 2022-07-12 PROCEDURE — 3017F COLORECTAL CA SCREEN DOC REV: CPT | Performed by: PHYSICIAN ASSISTANT

## 2022-07-12 PROCEDURE — 1090F PRES/ABSN URINE INCON ASSESS: CPT | Performed by: PHYSICIAN ASSISTANT

## 2022-07-12 PROCEDURE — G8427 DOCREV CUR MEDS BY ELIG CLIN: HCPCS | Performed by: PHYSICIAN ASSISTANT

## 2022-07-12 PROCEDURE — G9899 SCRN MAM PERF RSLTS DOC: HCPCS | Performed by: PHYSICIAN ASSISTANT

## 2022-07-12 PROCEDURE — G8417 CALC BMI ABV UP PARAM F/U: HCPCS | Performed by: PHYSICIAN ASSISTANT

## 2022-07-12 PROCEDURE — G8400 PT W/DXA NO RESULTS DOC: HCPCS | Performed by: PHYSICIAN ASSISTANT

## 2022-07-12 PROCEDURE — 1123F ACP DISCUSS/DSCN MKR DOCD: CPT | Performed by: PHYSICIAN ASSISTANT

## 2022-07-12 PROCEDURE — G0463 HOSPITAL OUTPT CLINIC VISIT: HCPCS | Performed by: PHYSICIAN ASSISTANT

## 2022-07-12 PROCEDURE — 99214 OFFICE O/P EST MOD 30 MIN: CPT | Performed by: PHYSICIAN ASSISTANT

## 2022-07-12 PROCEDURE — 1101F PT FALLS ASSESS-DOCD LE1/YR: CPT | Performed by: PHYSICIAN ASSISTANT

## 2022-07-12 NOTE — PROGRESS NOTES
[facility] [place], [place], [address], [facility], [place]      LEXY Jarrett, [facility]     Sydney Paniagua, [facility]   BRETT Li-CONNIE Hinojosa, [facility]       Flakito Deputado Omar De Payne 136    at [address], [address] [place] [address], [facility] 22.    [phone]    FAX: [address] [place] [address]    at [address], [address], [address]    [phone]    FAX: [phone]       Patient Care Team:  Jair Garnett NP as PCP - General (Family Nurse Practitioner)  Jair Garnett NP as PCP - [facility] Empaneled Provider  Raleigh Salinas MD (Gastroenterology)  Carmela Lama MD (Family Medicine)  Louie Guy MD as Physician (Endocrinology Physician)      Problem List  Date Reviewed: 3/10/2022          Codes Class Noted    Growth hormone deficiency [facility]) ICD-10-CM: E23.0  ICD-9-CM: 253.3  3/18/2021        Vitamin D deficiency ICD-10-CM: E55.9  ICD-9-CM: 268.9  3/18/2021        Obesity, morbid ([facility].) ICD-10-CM: E66.01  ICD-9-CM: 278.01  6/28/2018        S/P TOMASA (total abdominal hysterectomy) ICD-10-CM: Z90.710  ICD-9-CM: V88.01  4/19/2016        Type II diabetes mellitus ([facility].) ICD-10-CM: E11.9  ICD-9-CM: 250.00  4/19/2016        Essential hypertension ICD-10-CM: I10  ICD-9-CM: 401.9  3/23/2016        Hypercholesterolemia ICD-10-CM: E78.00  ICD-9-CM: 272.0  3/23/2016        Sleep apnea ICD-10-CM: G47.30  ICD-9-CM: 780.57  7/1/2014        MOFFETT (nonalcoholic steatohepatitis) ICD-10-CM: K75.81  ICD-9-CM: 571.8  6/28/2014        Lung nodule seen on imaging study (Chronic) ICD-10-CM: R91.1  ICD-9-CM: 793.11  6/28/2014    Overview Signed 6/28/2014  7:52 PM by Theodora Caicedo MD     3 mm in left upper lobe. Needs follow up CT in one year given smoking history. Obesity (Chronic) ICD-10-CM: E66.9  ICD-9-CM: 278.00  9/5/2011        Hypothyroidism (Chronic) ICD-10-CM: E03.9  ICD-9-CM: 244.9  9/4/2011        Depression (Chronic) ICD-10-CM: F32. A  ICD-9-CM: 892  9/4/2011        GERD (Chronic) ICD-10-CM: K21.9  ICD-9-CM: 530.81  9/4/2011        Fibromyalgia (Chronic) ICD-10-CM: M79.7  ICD-9-CM: 729.1  9/4/2011              Dane Landa returns to the [address] for management of non-alcoholic steatohepatitis (MOFFETT). The active problem list, all pertinent past medical history, medications, liver histology, radiologic findings and laboratory findings related to the liver disorder were reviewed with the patient. The patient is a 73 y.o.  female who was found to have MOFFETT on liver biopsy in 5/2016. Her body weight has not changed significantly in the past 4 years. Her weight had been up ~18# in [id number]. She reports that she had not been following a specific diet and often is eating prepared foods out of convenience. Of note, she is down ~10# in the past 4 months with some modest changes in diet and calorie intake. The patient underwent a repeat liver biopsy in 12/2020 to see if she could enter a clinical trial for treatment of MOFFETT. The procedure was well tolerated and demonstrates MOFFETT with stage 3 fibrosis. The patient notes fatigue, arthralgias, myalgias. She has had RUQ/flank pain on the right side last year and went for gallbladder removal in 7/2021. She has had some resolution of these symptoms and continued mild, achy pain. This is aggravated by a lot of activity and certain body positions. We assessed with a repeat ultrasound in 3/2022 and there was no new anatomic change. She reports that this hasn't changed in character much. She has easy fatigue and shortness of breath. Sleeps in a recliner as she can get comfortable in a recumbent position. She has sleep apnea and is supposed to be wearing CPAP.      She has had issues develop with bowel incontinence. She has had long-standing IBS and diarrhea. She has had recent EGD and colonoscopy with Dr Zach Burdick in 2/2022. Biopsies were negative for microscopic colitis. She has been recommended to increase fiber therapy. She had been on metformin in the past but reports that she stopped taking this on her own in the past several months. She has recently re-established with endocrinolgy and they are continuing to watch. She was noted to have low TSH in 3/2022 labs and has been working with endocrine on adjusting. The patient has limitations in functional activities secondary to these symptoms. The patient has not experienced problems concentrating, swelling of the abdomen, swelling of the lower extremities, hematemesis, hematochezia. Of note, she had cut out pepsi and and has been watching CHO intake and had at one point lost ~15#, she had a bit of a backslide, but has regained about 5#. She has had some increased depression and had been out medication for some time and has recently been given prescription for Lexapro and has restarted this today. ASSESSMENT AND PLAN:  MOFFETT  The diagnosis is based upon liver biopsy, features of metabolic syndrome, serologic studies that are negative for other causes of chronic liver disease. A liver biopsy performed in 5/2016 shows MOFFETT with stage 2 pericellular fibrosis. Fibroscan in 11/2020 suggests fatty liver and cirrhosis. A repeat liver biopsy in 12/2020 shows MOFFETT with stage 3 fibrosis. I have reviewed recent labs from late 5/2022. Liver transaminases are mildly elevated. ALP is normal.  Liver function is normal.  The platelet count is normal.  I have not repeated additional lab values today. I have asked her to continue her focus on a targeted weight loss of ~10%, or 25-30# as a goal for this year. if she is able to do this, we would expect steatosis to lessen and for a reduction in inflammation.   This would stabilize the liver condition and prevent further progression. We have discussed strategies for weight loss and set small goals in the next year. RUQ pain  She has continued to have symptoms despite cholecystectomy. We had sent her for additional evaluation of liver with ultrasound in 3/2022 and no other underlying etiology identified. I suspect some of this pain is due to hepatomegaly. I can easily palpate the liver. Treatment of other medical problems in patients with chronic liver disease  There are no contraindications for the patient to take most medications that are necessary for treatment of other medical issues. The patient does not comsume alcohol on a daily basis. Normal doses of acetaminophen, as recommended on the label of the bottle, are not hepatotoxic except in the setting of daily alcohol use, even in patients with cirrhosis and can be utilized for pain. Counseling for alcohol in patients with chronic liver disease  The patient was counseled regarding alcohol consumption and the effect of alcohol on chronic liver disease. The patient does not consume any significant amount of alcohol. Vaccinations   Vaccination for viral hepatitis A and B is recommended since the patient has no serologic evidence of previous exposure or vaccination with immunity. Routine vaccinations against other bacterial and viral agents can be performed as indicated. Annual flu vaccination should be administered if indicated. ALLERGIES  Allergies   Allergen Reactions    Liraglutide Nausea and Vomiting    No Allergy Information Available Other (comments)     Pt. Is allergic to Liraglutide    No Known Drug Allergies Unknown (comments)     Pt. Is allergic to Liraglutide    Pravastatin Other (comments)     Joint pain.        MEDICATIONS  Current Outpatient Medications   Medication Sig    doxepin (SINEquan) 25 mg capsule TAKE ONE CAPSULE BY MOUTH EVERY NIGHT AT BEDTIME    amLODIPine (NORVASC) 5 mg tablet Take 1 Tablet by mouth nightly.  escitalopram oxalate (LEXAPRO) 10 mg tablet Take 1 Tablet by mouth daily. (Patient taking differently: Take 20 mg by mouth daily.)    Unithroid 200 mcg tablet Take one tablet daily on an empty stomach     No current facility-administered medications for this visit. SYSTEM REVIEW NOT RELATED TO LIVER DISEASE OR REVIEWED ABOVE:  Constitution systems: Negative for fever, chills. Positive for weight loss. Eyes: Negative for visual changes. ENT: Negative for sore throat, painful swallowing. Respiratory: SOB. Cardiology: Chest pains. Negative cardiac catherization. GI:  Negative for constipation or diarrhea. Intermittent RUQ pain/pressure. : Negative for urinary frequency, dysuria, hematuria, nocturia. Skin: Negative for rash. Hematology: Negative for easy bruising, blood clots. Musculo-skeletal: Negative for back pain, muscle pain, weakness. Neurologic: Negative for headaches, dizziness, vertigo, memory problems not related to HE. Psychology: Negative for anxiety, depression. FAMILY HISTORY:  The father  of heart disease. The mother  at age 80s. There is no family history of liver disease. SOCIAL HISTORY:  The patient is . The patient has 3 children, and 2 grandchildren. The patient stopped using tobacco products in . The patient has never consumed significant amounts of alcohol. The patient used to work  for [employer]. The patient has not worked since . PHYSICAL EXAMINATION:  VS: per nursing note. General: No acute distress. Eyes: Sclera anicteric. ENT: No oral lesions. Skin: No rashes. spider angiomata. No jaundice. Abdomen: No obvious distention suggesting ascites. Liver edge firm and easily palpable 3-4 cm BCM. Extremities: No edema. No muscle wasting. Neurologic: Alert and oriented. Cranial nerves grossly intact.     LABORATORY STUDIES:  Liver [address] Units 5/19/2022 5/10/2022   WBC 3.6 - 11.0 K/uL 3.8 5.2   ANC 1.8 - 8.0 K/UL  3.2   HGB 11.5 - 16.0 g/dL 13.1 14.7    - 400 K/uL 224 200   INR 0.9 - 1.1    1.1   AST 15 - 37 U/L 48 (H) 33   ALT 12 - 78 U/L 47 52   Alk Phos 45 - 117 U/L 77 90   Bili, Total 0.2 - 1.0 MG/DL 0.6 0.5   Bili, Direct 0.0 - 0.2 MG/DL     Albumin 3.5 - 5.0 g/dL 3.5 3.6   BUN 6 - 20 MG/DL 10 11   Creat 0.55 - 1.02 MG/DL 0.88 0.97   Na 136 - 145 mmol/L 142 142   K 3.5 - 5.1 mmol/L 4.2 3.7   Cl 97 - 108 mmol/L 109 (H) 106   CO2 21 - 32 mmol/L 27 26   Glucose 65 - 100 mg/dL 126 (H) 167 (H)     Liver [facility] Latest Ref Rng & Units 3/10/2022   WBC 3.6 - 11.0 K/uL 6.3   ANC 1.8 - 8.0 K/UL    HGB 11.5 - 16.0 g/dL 14.0    - 400 K/uL 218   INR 0.9 - 1.1      AST 15 - 37 U/L 55 (H)   ALT 12 - 78 U/L 54   Alk Phos 45 - 117 U/L 96   Bili, Total 0.2 - 1.0 MG/DL 0.9   Bili, Direct 0.0 - 0.2 MG/DL 0.3 (H)   Albumin 3.5 - 5.0 g/dL 3.9   BUN 6 - 20 MG/DL 10   Creat 0.55 - 1.02 MG/DL 0.99   Na 136 - 145 mmol/L 138   K 3.5 - 5.1 mmol/L 4.0   Cl 97 - 108 mmol/L 107   CO2 21 - 32 mmol/L 27   Glucose 65 - 100 mg/dL 125 (H)     Cancer Screening Latest Ref Rng & Units 3/10/2022   AFP, Serum 0.0 - 9.2 ng/mL 8.2   AFP-L3% 0.0 - 9.9 % 6.9   No additional lab values drawn at today's office visit. SEROLOGIES:  Serologies Latest Ref Rng 4/19/2016 11/19/2015   Hep A Ab, Total Negative Negative    Hep B Surface Ag Negative Negative    Hep B Core Ab, Total Negative Negative    Hep B Surface AB QL  Non Reactive    Hep C Ab 0.0 - 0.9 s/co ratio  <0.1   Ferritin 15 - 150 ng/mL 23    Iron % Saturation 15 - 55 % 18    ROBBY, IFA  Negative    ASMCA 0 - 19 Units 25 (H)    Ceruloplasmin 19.0 - 39.0 mg/dL 26.9    Alpha-1 antitrypsin level 90 - 200 mg/dL 181      LIVER HISTOLOGY:  5/2016. Slides reviewed by TATAS. MOFFETT. 75-90% macro and micovesicular steatosis. Mild ballooning.   Moderate inflammation. pericellular fibrosis. 10/2020. [facility]. EkPa was 26.4. Suggested fibrosis level is F4. CAP score is 400, this is consistent with steatosis. 12/2020. Slides reviewed by MLS. MOFFETT. 66-75% macrovesicular and micovesicular steatosis, Moderte inflammation, Severe ballooning, Stage 3 fibrosis. ORION (322). ENDOSCOPIC PROCEDURES:  2/4/2022. EGD performed by Dr oLuie Orona. No esophageal varices. No gastric varices. No portal gastropathy. One small duodenal polyp. 2/4/2022. Colonoscopy performed by Dr Louie Orona. Small internal hemorrhoids, otherwise mucosa within normal. Repeat 5 years. RADIOLOGY:  3/2016. Ultrasound of liver. Echogenic consistent with fatty liver. No liver mass lesions. No dilated bile ducts. No ascites. 12/2016. CT Abd and Pelvis. Status post hysterectomy. No acute abnormality. Hepatic steatosis. 5/2021. Ultrasound of liver. Echogenic consistent with chronic liver disease. No liver mass lesions. No dilated bile ducts. No ascites. +cholelithiasis with associated tenderness. splenomegaly identified. 3/2022. Ultrasound of liver. Echogenic consistent with chronic liver disease. No liver mass lesions. No dilated bile ducts. No ascites. + hepatomegaly. OTHER TESTING:  Not available or performed    FOLLOW-UP:    All of the issues listed above in the Assessment and Plan were discussed with the patient. All questions were answered. The patient expressed a clear understanding of the above. An in-person follow-up visit will be scheduled at [place] [address] in 6 months for monitoring purposes. Documentation reviewed and updated to reflect current, accurate patient information.     Sondra Bustamante PA-C  Liver [facility] 59, [facility] 22.  [phone]  [address] [place] [address]

## 2022-07-12 NOTE — PROGRESS NOTES
Identified pt with two pt identifiers(name and ). Reviewed record in preparation for visit and have obtained necessary documentation. Chief Complaint   Patient presents with    Cirrhosis Of Liver     4month f/u with Taylor      Vitals:    22 1421   BP: 127/63   Pulse: 89   Temp: 97.1 °F (36.2 °C)   TempSrc: Temporal   SpO2: 96%   Weight: 277 lb 12.8 oz (126 kg)   Height: 5' 6\" (1.676 m)   PainSc:   4   PainLoc: Back       Health Maintenance Review: Patient reminded of \"due or due soon\" health maintenance. I have asked the patient to contact his/her primary care provider (PCP) for follow-up on his/her health maintenance. Coordination of Care Questionnaire:  :   1) Have you been to an emergency room, urgent care, or hospitalized since your last visit? If yes, where when, and reason for visit? Yes. Bronchitis       2. Have seen or consulted any other health care provider since your last visit? If yes, where when, and reason for visit? NO      Patient is accompanied by self I have received verbal consent from Shania Garcia to discuss any/all medical information while they are present in the room.

## 2022-07-13 RX ORDER — LEVOTHYROXINE SODIUM 200 UG/1
TABLET ORAL
Qty: 90 TABLET | Refills: 3 | Status: SHIPPED | OUTPATIENT
Start: 2022-07-13

## 2022-09-03 ENCOUNTER — HOSPITAL ENCOUNTER (EMERGENCY)
Age: 67
Discharge: HOME | End: 2022-09-03
Attending: EMERGENCY MEDICINE
Payer: MEDICARE

## 2022-09-03 ENCOUNTER — APPOINTMENT (OUTPATIENT)
Dept: CT IMAGING | Age: 67
End: 2022-09-03
Attending: EMERGENCY MEDICINE
Payer: MEDICARE

## 2022-09-03 ENCOUNTER — APPOINTMENT (OUTPATIENT)
Dept: GENERAL RADIOLOGY | Age: 67
End: 2022-09-03
Attending: EMERGENCY MEDICINE
Payer: MEDICARE

## 2022-09-03 VITALS
WEIGHT: 283.29 LBS | TEMPERATURE: 97.3 F | HEIGHT: 66 IN | SYSTOLIC BLOOD PRESSURE: 156 MMHG | HEART RATE: 69 BPM | BODY MASS INDEX: 45.53 KG/M2 | OXYGEN SATURATION: 97 % | DIASTOLIC BLOOD PRESSURE: 53 MMHG | RESPIRATION RATE: 16 BRPM
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
## 2022-09-03 PROCEDURE — 99284 EMERGENCY DEPT VISIT MOD MDM: CPT

## 2022-09-03 PROCEDURE — 74011250637 HC RX REV CODE- 250/637: Performed by: EMERGENCY MEDICINE

## 2022-09-03 PROCEDURE — 73502 X-RAY EXAM HIP UNI 2-3 VIEWS: CPT

## 2022-09-03 PROCEDURE — 73030 X-RAY EXAM OF SHOULDER: CPT

## 2022-09-03 PROCEDURE — 70486 CT MAXILLOFACIAL W/O DYE: CPT

## 2022-09-03 RX ORDER — HYDROCODONE BITARTRATE AND ACETAMINOPHEN 5; 325 MG/1; MG/1
1 TABLET ORAL ONCE
Status: COMPLETED | OUTPATIENT
Start: 2022-09-03 | End: 2022-09-03

## 2022-09-03 RX ORDER — NAPROXEN 500 MG/1
500 TABLET ORAL 2 TIMES DAILY WITH MEALS
Qty: 20 TABLET | Refills: 0 | Status: SHIPPED | OUTPATIENT
Start: 2022-09-03 | End: 2022-09-13

## 2022-09-03 RX ORDER — IBUPROFEN 800 MG/1
800 TABLET ORAL ONCE
Status: COMPLETED | OUTPATIENT
Start: 2022-09-03 | End: 2022-09-03

## 2022-09-03 RX ORDER — CYCLOBENZAPRINE HCL 5 MG
5 TABLET ORAL
Qty: 20 TABLET | Refills: 0 | Status: SHIPPED | OUTPATIENT
Start: 2022-09-03 | End: 2022-10-28

## 2022-09-03 RX ADMIN — HYDROCODONE BITARTRATE AND ACETAMINOPHEN 1 TABLET: 5; 325 TABLET ORAL at 18:03

## 2022-09-03 RX ADMIN — IBUPROFEN 800 MG: 800 TABLET, FILM COATED ORAL at 18:03

## 2022-09-03 NOTE — ED TRIAGE NOTES
Pt rpts slipping in the shower and hitting the right side of her face on the shower door frame and then landed inside the tub injuring her right posterior thigh, right hip and right shoulder. Denies LOC, dizziness or blurred vision.

## 2022-09-03 NOTE — ED NOTES
Pt given discharge instructions by Dr RIVER [facility] she verbalizes an understanding pt stable at time of discharge ambulates to lobby with friend

## 2022-09-03 NOTE — DISCHARGE INSTRUCTIONS
Thank you for allowing us to provide you with medical care today. We realize that you have many choices for your emergency care needs. We thank you for choosing [facility]. Please choose us in the future for any continued health care needs. We hope we addressed all of your medical concerns. We strive to provide excellent quality care in the Emergency Department. Anything less than excellent does not meet our expectations. The exam and treatment you received in the Emergency Department were for an emergent problem and are not intended as complete care. It is important that you follow up with a doctor, nurse practitioner, or physician's assistant for ongoing care. If your symptoms worsen or you do not improve as expected and you are unable to reach your usual health care provider, you should return to the Emergency Department. We are available 24 hours a day. Take this sheet with you when you go to your follow-up visit. If you have any problem arranging the follow-up visit, contact the Emergency Department immediately. Make an appointment your family doctor for follow up of this visit. Return to the ER if you are unable to be seen in a timely manner.

## 2022-09-03 NOTE — ED PROVIDER NOTES
81-year-old female with history of depression, diabetes, GERD, high cholesterol, liver disease, obesity presents to the emergency department after fall. She tells me she slipped while getting out of the shower and complains of pain on her right face, right shoulder, right hip. No loss of consciousness. No blood thinners. The history is provided by the patient and medical records. Fall  The accident occurred Less than 1 hour ago. The fall occurred while walking. She fell from a height of ground level. She landed on Hard floor. There was no blood loss. The point of impact was the head, right shoulder and right hip. The pain is present in the head, right shoulder and right hip. The pain is moderate. She was Ambulatory at the scene. There was No drug use involved in the accident. Pertinent negatives include no fever, no abdominal pain, no nausea, no vomiting and no headaches. She has tried nothing for the symptoms. Facial Injury  Pertinent negatives include no chest pain, no abdominal pain, no headaches and no shortness of breath.    Leg Pain     Shoulder Injury     Hip Injury        Past Medical History:   Diagnosis Date    Depression     Diabetes ([facility]     Gastrointestinal disorder     gerd    GERD (gastroesophageal reflux disease)     High cholesterol     Liver disease     elevated liver enzymes/fatty liver    Obese     uses CPAP    Other ill-defined conditions(799.89)     fibromyalgia    Psychiatric disorder     depression    PUD (peptic ulcer disease)     yrs ago    Sleep apnea     Thyroid disease        Past Surgical History:   Procedure Laterality Date    BIOPSY LIVER  2022    performed during gall bladder surgery, LBX done unknown to patient    COLONOSCOPY N/A 2022    COLONOSCOPY performed by Brendan Yuen MD at [address] [place] [address] Drive       X 2    HX CHOLECYSTECTOMY      HX GYN      hysterectomy    HX LAP CHOLECYSTECTOMY  2021    HX SEPTOPLASTY      IR BX LIVER PERCUTANEOUS Family History:   Problem Relation Age of Onset    Hypertension Mother     Heart Disease Father     Cancer Brother         ? where    Colon Cancer Maternal Grandmother     Cancer Maternal Grandmother         colon    Lung Cancer Maternal Grandfather     Emphysema Paternal Grandfather        Social History     Socioeconomic History    Marital status:      Spouse name: Not on file    Number of children: Not on file    Years of education: Not on file    Highest education level: Not on file   Occupational History    Not on file   Tobacco Use    Smoking status: Former    Smokeless tobacco: Never    Tobacco comments:     Quit 16y ago. Vaping Use    Vaping Use: Never used   Substance and Sexual Activity    Alcohol use: Not Currently     Alcohol/week: 0.0 standard drinks     Comment: rarely     Drug use: Never    Sexual activity: Not Currently     Birth control/protection: None   Other Topics Concern    Not on file   Social History Narrative    Not on file     Social Determinants of Health     Financial Resource Strain: Not on file   Food Insecurity: Not on file   Transportation Needs: Not on file   Physical Activity: Not on file   Stress: Not on file   Social Connections: Not on file   Intimate Partner Violence: Not on file   Housing Stability: Not on file         ALLERGIES: Liraglutide, No allergy information available, No known drug allergies, and Pravastatin    Review of Systems   Constitutional:  Negative for fatigue and fever. HENT:  Negative for sneezing and sore throat. Respiratory:  Negative for cough and shortness of breath. Cardiovascular:  Negative for chest pain and leg swelling. Gastrointestinal:  Negative for abdominal pain, diarrhea, nausea and vomiting. Genitourinary:  Negative for difficulty urinating and dysuria. Musculoskeletal:  Negative for arthralgias and myalgias. Skin:  Negative for color change and rash. Neurological:  Negative for weakness and headaches. Psychiatric/Behavioral:  Negative for agitation and behavioral problems. Vitals:    09/03/22 1653   BP: (!) 156/53   Pulse: 69   Resp: 16   Temp: 97.3 °F (36.3 °C)   SpO2: 97%   Weight: 128.5 kg (283 lb 4.7 oz)   Height: 5' 6\" (1.676 m)            Physical Exam  Vitals and nursing note reviewed. Constitutional:       General: She is not in acute distress. Appearance: Normal appearance. She is well-developed. She is not ill-appearing, toxic-appearing or diaphoretic. HENT:      Head: Normocephalic. Nose: Nose normal.      Mouth/Throat:      Mouth: Mucous membranes are moist.      Pharynx: Oropharynx is clear. Eyes:      Extraocular Movements: Extraocular movements intact. Conjunctiva/sclera: Conjunctivae normal.      Pupils: Pupils are equal, round, and reactive to light. Cardiovascular:      Rate and Rhythm: Normal rate and regular rhythm. Pulses: Normal pulses. Heart sounds: Normal heart sounds. Pulmonary:      Effort: Pulmonary effort is normal. No respiratory distress. Breath sounds: Normal breath sounds. No wheezing. Chest:      Chest wall: No tenderness. Abdominal:      General: Abdomen is flat. There is no distension. Palpations: Abdomen is soft. Tenderness: There is no abdominal tenderness. There is no guarding or rebound. Musculoskeletal:         General: Tenderness present. No swelling, deformity or signs of injury. Normal range of motion. Cervical back: Normal range of motion and neck supple. No rigidity. No muscular tenderness. Right lower leg: No edema. Left lower leg: No edema. Comments: Tenderness over the lateral portion of her right hip without deformity   Skin:     General: Skin is warm and dry. Capillary Refill: Capillary refill takes less than 2 seconds. Neurological:      General: No focal deficit present. Mental Status: She is alert and oriented to person, place, and time.    Psychiatric:         Mood and Affect: Mood normal.         Behavior: Behavior normal.        MDM  Number of Diagnoses or Management Options  Fall, initial encounter  Diagnosis management comments: 69-year-old female presents as above after fall. She suffered right-sided facial contusions and orthopedic pain without acute injuries on x-ray or CT. Plan to treat with anti-inflammatory, muscle relaxer, follow-up primary care, return if needed.        Amount and/or Complexity of Data Reviewed  Tests in the radiology section of CPT®: reviewed           Procedures

## 2022-10-26 ENCOUNTER — NURSE TRIAGE (OUTPATIENT)
Dept: OTHER | Facility: CLINIC | Age: 67
End: 2022-10-26

## 2022-10-26 NOTE — TELEPHONE ENCOUNTER
Location of patient: [address] [facility]  call from Olena at [facility] with [facility]. Subjective: Caller states \"Labor Day weekend I slipped getting out of the tub. My face hit the frame of the sliding doors. I went to [address] at [facility]. They did a CT scan of my head and they didn't seen anything broken. 99% of the bruising is gone but I still have swelling  that won't go away. \"     Current Symptoms: right eye swelling    States is able to open an close eye as unusual    Denies - visual disturbance / drainage from eye    Onset: 2 month ago;       Pain Severity: 1/10; Temperature: denies     What has been tried: ibuprofen      Recommended disposition: See PCP within 3 Days    Care advice provided, patient verbalizes understanding; denies any other questions or concerns; instructed to call back for any new or worsening symptoms. Patient/Caller agrees with recommended disposition; writer provided warm transfer to [facility] at [facility] for appointment scheduling    Attention Provider: Thank you for allowing me to participate in the care of your patient. The patient was connected to triage in response to information provided to the [facility]. Please do not respond through this encounter as the response is not directed to a shared pool.         Reason for Disposition   MILD eyelid swelling (puffiness) and persists > 3 days    Protocols used: Eye - Swelling-ADULT-[place]

## 2022-10-28 ENCOUNTER — OFFICE VISIT (OUTPATIENT)
Dept: FAMILY MEDICINE CLINIC | Age: 67
End: 2022-10-28
Payer: MEDICARE

## 2022-10-28 VITALS
DIASTOLIC BLOOD PRESSURE: 70 MMHG | TEMPERATURE: 98.4 F | SYSTOLIC BLOOD PRESSURE: 140 MMHG | HEIGHT: 65 IN | WEIGHT: 284.2 LBS | RESPIRATION RATE: 17 BRPM | HEART RATE: 81 BPM | OXYGEN SATURATION: 97 % | BODY MASS INDEX: 47.35 KG/M2

## 2022-10-28 DIAGNOSIS — I10 PRIMARY HYPERTENSION: Primary | ICD-10-CM

## 2022-10-28 DIAGNOSIS — Z00.00 PREVENTATIVE HEALTH CARE: ICD-10-CM

## 2022-10-28 DIAGNOSIS — K75.81 NASH (NONALCOHOLIC STEATOHEPATITIS): ICD-10-CM

## 2022-10-28 DIAGNOSIS — Z23 ENCOUNTER FOR IMMUNIZATION: ICD-10-CM

## 2022-10-28 DIAGNOSIS — F33.1 MODERATE EPISODE OF RECURRENT MAJOR DEPRESSIVE DISORDER (HCC): ICD-10-CM

## 2022-10-28 DIAGNOSIS — I10 ESSENTIAL HYPERTENSION: ICD-10-CM

## 2022-10-28 DIAGNOSIS — E06.9 THYROIDITIS: ICD-10-CM

## 2022-10-28 PROCEDURE — 90694 VACC AIIV4 NO PRSRV 0.5ML IM: CPT | Performed by: FAMILY MEDICINE

## 2022-10-28 PROCEDURE — 99213 OFFICE O/P EST LOW 20 MIN: CPT | Performed by: FAMILY MEDICINE

## 2022-10-28 PROCEDURE — G0463 HOSPITAL OUTPT CLINIC VISIT: HCPCS | Performed by: FAMILY MEDICINE

## 2022-10-28 PROCEDURE — 90686 IIV4 VACC NO PRSV 0.5 ML IM: CPT | Performed by: FAMILY MEDICINE

## 2022-10-28 PROCEDURE — 90471 IMMUNIZATION ADMIN: CPT | Performed by: FAMILY MEDICINE

## 2022-10-28 PROCEDURE — 1123F ACP DISCUSS/DSCN MKR DOCD: CPT | Performed by: FAMILY MEDICINE

## 2022-10-28 PROCEDURE — 3078F DIAST BP <80 MM HG: CPT | Performed by: FAMILY MEDICINE

## 2022-10-28 PROCEDURE — 3074F SYST BP LT 130 MM HG: CPT | Performed by: FAMILY MEDICINE

## 2022-10-28 RX ORDER — TELMISARTAN 20 MG/1
20 TABLET ORAL DAILY
Qty: 30 TABLET | Refills: 0 | Status: SHIPPED | OUTPATIENT
Start: 2022-10-28

## 2022-10-28 RX ORDER — AMLODIPINE BESYLATE 5 MG/1
5 TABLET ORAL
Qty: 30 TABLET | Refills: 0 | Status: SHIPPED | OUTPATIENT
Start: 2022-10-28 | End: 2022-10-28

## 2022-10-28 RX ORDER — ESCITALOPRAM OXALATE 10 MG/1
10 TABLET ORAL DAILY
Qty: 90 TABLET | Refills: 1 | Status: SHIPPED | OUTPATIENT
Start: 2022-10-28

## 2022-10-28 NOTE — PROGRESS NOTES
Mitchell Joseph (: 1955) is a 78 y.o. female, new patient, here for evaluation of the following chief complaint(s):  Eye Swelling (Right eye swelling from a fall two months ago that is still swollen ) and Medication Refill       ASSESSMENT/PLAN:  Below is the assessment and plan developed based on review of pertinent history, physical exam, labs, studies, and medications. 1. Primary hypertension  -     HEMOGLOBIN A1C WITH EAG; Future  2. Moderate episode of recurrent major depressive disorder (HCC)  -     escitalopram oxalate (LEXAPRO) 10 mg tablet; Take 1 Tablet by mouth daily. , Normal, Disp-90 Tablet, R-1  3. Thyroiditis  -     HEMOGLOBIN A1C WITH EAG; Future  4. Essential hypertension  5. Preventative health care  -     INFLUENZA, FLUAD, (AGE 65 Y+), IM, PF, 0.5 ML  6. Encounter for immunization   -     INFLUENZA, FLUAD, (AGE 65 Y+), IM, PF, 0.5 ML  7. MOFFETT (nonalcoholic steatohepatitis)      No follow-ups on file. SUBJECTIVE/OBJECTIVE:  Mitchell Joseph is a 78 y.o. 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 Patient also would like medication refills. Hasn't taken her blood pressure medications, we discussed the use of telmisartan for blood pressure. Review of Systems   Constitutional:  Negative for chills, diaphoresis, fatigue and unexpected weight change. HENT:  Negative for congestion, ear pain, facial swelling and rhinorrhea. Eyes: Negative. Respiratory: Negative. Cardiovascular:  Negative for chest pain, palpitations and leg swelling. Gastrointestinal:  Negative for abdominal pain, constipation and diarrhea. Endocrine: Negative. Genitourinary: Negative. Negative for difficulty urinating. Musculoskeletal: Negative. Skin: Negative. Allergic/Immunologic: Negative. Neurological: Negative. Negative for dizziness, speech difficulty, weakness and light-headedness. Hematological: Negative. Psychiatric/Behavioral: Negative. Physical Exam  Constitutional:       Appearance: She is obese. HENT:      Head: Normocephalic and atraumatic. Right Ear: Tympanic membrane, ear canal and external ear normal.      Left Ear: Tympanic membrane, ear canal and external ear normal.      Nose: Nose normal.      Mouth/Throat:      Mouth: Mucous membranes are moist.      Pharynx: Oropharynx is clear. Eyes:      Extraocular Movements: Extraocular movements intact. Conjunctiva/sclera: Conjunctivae normal.      Pupils: Pupils are equal, round, and reactive to light. Cardiovascular:      Rate and Rhythm: Normal rate and regular rhythm. Pulses: Normal pulses. Heart sounds: Normal heart sounds. Pulmonary:      Effort: Pulmonary effort is normal.      Breath sounds: Normal breath sounds. Abdominal:      General: Abdomen is flat. Bowel sounds are normal.      Palpations: Abdomen is soft. Musculoskeletal:         General: Normal range of motion. Cervical back: Normal range of motion and neck supple. Skin:     General: Skin is warm and dry. Capillary Refill: Capillary refill takes less than 2 seconds. Neurological:      General: No focal deficit present. Mental Status: She is alert and oriented to person, place, and time. Mental status is at baseline. Psychiatric:         Mood and Affect: Mood normal.         Behavior: Behavior normal.         Thought Content: Thought content normal.         Judgment: Judgment normal.         An electronic signature was used to authenticate this note.   -- Farshad Wood MD

## 2022-10-28 NOTE — PROGRESS NOTES
Identified pt with two pt identifiers(name and ).     Chief Complaint   Patient presents with    Eye Swelling     Right eye swelling from a fall two months ago that is still swollen         Health Maintenance Due   Topic    COVID-19 Vaccine (1)    Pneumococcal 65+ years (1 - PCV)    Shingrix Vaccine Age 50> (1 of 2)    Foot Exam Q1     Bone Densitometry (Dexa) Screening     Flu Vaccine (1)       Wt Readings from Last 3 Encounters:   22 283 lb 4.7 oz (128.5 kg)   22 277 lb 12.8 oz (126 kg)   22 274 lb (124.3 kg)     Temp Readings from Last 3 Encounters:   22 97.3 °F (36.3 °C)   22 97.1 °F (36.2 °C) (Temporal)   22 98.1 °F (36.7 °C) (Temporal)     BP Readings from Last 3 Encounters:   22 (!) 156/53   22 127/63   22 (!) 147/75     Pulse Readings from Last 3 Encounters:   22 69   22 89   22 75         Learning Assessment:  :     Learning Assessment 2014   PRIMARY LEARNER Patient   HIGHEST LEVEL OF EDUCATION - PRIMARY LEARNER  DID NOT GRADUATE HIGH SCHOOL   BARRIERS PRIMARY LEARNER NONE   CO-LEARNER CAREGIVER No   PRIMARY LANGUAGE ENGLISH   LEARNER PREFERENCE PRIMARY OTHER (COMMENT)   ANSWERED BY patient   RELATIONSHIP SELF       Depression Screening:  :     3 most recent PHQ Screens 2022   PHQ Not Done -   Little interest or pleasure in doing things Several days   Feeling down, depressed, irritable, or hopeless Several days   Total Score PHQ 2 2   Trouble falling or staying asleep, or sleeping too much -   Feeling tired or having little energy -   Poor appetite, weight loss, or overeating -   Feeling bad about yourself - or that you are a failure or have let yourself or your family down -   Trouble concentrating on things such as school, work, reading, or watching TV -   Moving or speaking so slowly that other people could have noticed; or the opposite being so fidgety that others notice -   Thoughts of being better off dead, or hurting yourself in some way -   PHQ 9 Score -   How difficult have these problems made it for you to do your work, take care of your home and get along with others -       Fall Risk Assessment:  :     Fall Risk Assessment, last 12 mths 3/10/2022   Able to walk? Yes   Fall in past 12 months? 0   Do you feel unsteady? 0   Are you worried about falling 0       Abuse Screening:  :     Abuse Screening Questionnaire 5/19/2022 3/18/2021 1/8/2020 10/1/2018 10/11/2017 10/10/2016 5/13/2015   Do you ever feel afraid of your partner? N N N N N N N   Are you in a relationship with someone who physically or mentally threatens you? N N N N N N N   Is it safe for you to go home? Y Y Y Y Y Y Y       Coordination of Care Questionnaire:  :     1) Have you been to an emergency room, urgent care clinic since your last visit? yes yes [facility] labor day weekend   Hospitalized since your last visit? no             2) Have you seen or consulted any other health care providers outside of [address] [place] [address] since your last visit? no  (Include any pap smears or colon screenings in this section.)    3) Do you have an Advance Directive on file? no  Are you interested in receiving information about Advance Directives? no    Patient is accompanied by N/A I have received verbal consent from [facility] to discuss any/all medical information while they are present in the room. 4.  For patients aged 39-70: Has the patient had a colonoscopy / FIT/ Cologuard? Yes - no Care Gap present      If the patient is female:    5. For patients aged 41-77: Has the patient had a mammogram within the past 2 years? Yes - no Care Gap present      6. For patients aged 21-65: Has the patient had a pap smear?  NA - based on age or sex

## 2022-11-18 ENCOUNTER — OFFICE VISIT (OUTPATIENT)
Dept: FAMILY MEDICINE CLINIC | Age: 67
End: 2022-11-18
Payer: MEDICARE

## 2022-11-18 VITALS
SYSTOLIC BLOOD PRESSURE: 136 MMHG | HEART RATE: 77 BPM | BODY MASS INDEX: 47.32 KG/M2 | DIASTOLIC BLOOD PRESSURE: 72 MMHG | WEIGHT: 284 LBS | HEIGHT: 65 IN | OXYGEN SATURATION: 97 % | RESPIRATION RATE: 16 BRPM | TEMPERATURE: 97.1 F

## 2022-11-18 DIAGNOSIS — R52 PAIN: ICD-10-CM

## 2022-11-18 DIAGNOSIS — K75.81 NASH (NONALCOHOLIC STEATOHEPATITIS): Primary | ICD-10-CM

## 2022-11-18 PROCEDURE — G0463 HOSPITAL OUTPT CLINIC VISIT: HCPCS | Performed by: FAMILY MEDICINE

## 2022-11-18 PROCEDURE — 3078F DIAST BP <80 MM HG: CPT | Performed by: FAMILY MEDICINE

## 2022-11-18 PROCEDURE — 3074F SYST BP LT 130 MM HG: CPT | Performed by: FAMILY MEDICINE

## 2022-11-18 PROCEDURE — 99212 OFFICE O/P EST SF 10 MIN: CPT | Performed by: FAMILY MEDICINE

## 2022-11-18 PROCEDURE — 1123F ACP DISCUSS/DSCN MKR DOCD: CPT | Performed by: FAMILY MEDICINE

## 2022-11-18 RX ORDER — METFORMIN HYDROCHLORIDE 500 MG/1
500 TABLET ORAL 2 TIMES DAILY WITH MEALS
Qty: 120 TABLET | Refills: 0 | Status: SHIPPED | OUTPATIENT
Start: 2022-11-18

## 2022-11-18 RX ORDER — CYCLOBENZAPRINE HCL 5 MG
5 TABLET ORAL
Qty: 30 TABLET | Refills: 0 | Status: SHIPPED | OUTPATIENT
Start: 2022-11-18

## 2022-11-18 NOTE — PROGRESS NOTES
Identified pt with two pt identifiers(name and ).     Chief Complaint   Patient presents with    Side Pain     R side pain for a week     Medication Problem     Wants to go over medications         Health Maintenance Due   Topic    COVID-19 Vaccine (1)    Pneumococcal 65+ years (1 - PCV)    Shingrix Vaccine Age 50> (1 of 2)    Foot Exam Q1     Bone Densitometry (Dexa) Screening        Wt Readings from Last 3 Encounters:   22 284 lb (128.8 kg)   10/28/22 284 lb 3.2 oz (128.9 kg)   22 283 lb 4.7 oz (128.5 kg)     Temp Readings from Last 3 Encounters:   22 97.1 °F (36.2 °C) (Temporal)   10/28/22 98.4 °F (36.9 °C) (Temporal)   22 97.3 °F (36.3 °C)     BP Readings from Last 3 Encounters:   22 136/72   10/28/22 (!) 140/70   22 (!) 156/53     Pulse Readings from Last 3 Encounters:   10/28/22 81   22 69   22 89         Learning Assessment:  :     Learning Assessment 2014   PRIMARY LEARNER Patient   HIGHEST LEVEL OF EDUCATION - PRIMARY LEARNER  DID NOT GRADUATE HIGH SCHOOL   BARRIERS PRIMARY LEARNER NONE   CO-LEARNER CAREGIVER No   PRIMARY LANGUAGE ENGLISH   LEARNER PREFERENCE PRIMARY OTHER (COMMENT)   ANSWERED BY patient   RELATIONSHIP SELF       Depression Screening:  :     3 most recent PHQ Screens 2022   PHQ Not Done -   Little interest or pleasure in doing things Not at all   Feeling down, depressed, irritable, or hopeless Not at all   Total Score PHQ 2 0   Trouble falling or staying asleep, or sleeping too much -   Feeling tired or having little energy -   Poor appetite, weight loss, or overeating -   Feeling bad about yourself - or that you are a failure or have let yourself or your family down -   Trouble concentrating on things such as school, work, reading, or watching TV -   Moving or speaking so slowly that other people could have noticed; or the opposite being so fidgety that others notice -   Thoughts of being better off dead, or hurting yourself in some way -   PHQ 9 Score -   How difficult have these problems made it for you to do your work, take care of your home and get along with others -       Fall Risk Assessment:  :     Fall Risk Assessment, last 12 mths 3/10/2022   Able to walk? Yes   Fall in past 12 months? 0   Do you feel unsteady? 0   Are you worried about falling 0       Abuse Screening:  :     Abuse Screening Questionnaire 11/18/2022 5/19/2022 3/18/2021 1/8/2020 10/1/2018 10/11/2017 10/10/2016   Do you ever feel afraid of your partner? N N N N N N N   Are you in a relationship with someone who physically or mentally threatens you? N N N N N N N   Is it safe for you to go home? Y Y Y Y Y Y Y       Coordination of Care Questionnaire:  :     1) Have you been to an emergency room, urgent care clinic since your last visit? no   Hospitalized since your last visit? no             2) Have you seen or consulted any other health care providers outside of [address] [place] [address] since your last visit? no  (Include any pap smears or colon screenings in this section.)    3) Do you have an Advance Directive on file? no  Are you interested in receiving information about Advance Directives? no    Patient is accompanied by N/A I have received verbal consent from Javon Herring to discuss any/all medical information while they are present in the room. 4.  For patients aged 39-70: Has the patient had a colonoscopy / FIT/ Cologuard? Yes - no Care Gap present      If the patient is female:    5. For patients aged 41-77: Has the patient had a mammogram within the past 2 years? Yes - no Care Gap present      6. For patients aged 21-65: Has the patient had a pap smear?  NA - based on age or sex

## 2022-11-22 NOTE — PROGRESS NOTES
Sergio Kelley (: 1955) is a 78 y.o. female, new patient, here for evaluation of the following chief complaint(s):  Side Pain (R side pain for a week ) and Medication Problem (Wants to go over medications )       ASSESSMENT/PLAN:  Below is the assessment and plan developed based on review of pertinent history, physical exam, labs, studies, and medications. 1. MOFFETT (nonalcoholic steatohepatitis)  -     metFORMIN (GLUCOPHAGE) 500 mg tablet; Take 1 Tablet by mouth two (2) times daily (with meals). , Normal, Disp-120 Tablet, R-0  -     cyclobenzaprine (FLEXERIL) 5 mg tablet; Take 1 Tablet by mouth nightly., Normal, Disp-30 Tablet, R-0  2. Pain      No follow-ups on file. SUBJECTIVE/OBJECTIVE:  Sergio Kelley is a 78 y.o. 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 Patient also would like medication refills. Hasn't taken her blood pressure medications, we discussed the use of telmisartan for blood pressure. Review of Systems   Constitutional:  Negative for chills, diaphoresis, fatigue and unexpected weight change. HENT:  Negative for congestion, ear pain, facial swelling and rhinorrhea. Eyes: Negative. Respiratory: Negative. Cardiovascular:  Negative for chest pain, palpitations and leg swelling. Gastrointestinal:  Negative for abdominal pain, constipation and diarrhea. Endocrine: Negative. Genitourinary: Negative. Negative for difficulty urinating. Musculoskeletal: Negative. Skin: Negative. Allergic/Immunologic: Negative. Neurological: Negative. Negative for dizziness, speech difficulty, weakness and light-headedness. Hematological: Negative. Psychiatric/Behavioral: Negative. Physical Exam  Constitutional:       Appearance: She is obese. HENT:      Head: Normocephalic and atraumatic.       Right Ear: Tympanic membrane, ear canal and external ear normal. Left Ear: Tympanic membrane, ear canal and external ear normal.      Nose: Nose normal.      Mouth/Throat:      Mouth: Mucous membranes are moist.      Pharynx: Oropharynx is clear. Eyes:      Extraocular Movements: Extraocular movements intact. Conjunctiva/sclera: Conjunctivae normal.      Pupils: Pupils are equal, round, and reactive to light. Cardiovascular:      Rate and Rhythm: Normal rate and regular rhythm. Pulses: Normal pulses. Heart sounds: Normal heart sounds. Pulmonary:      Effort: Pulmonary effort is normal.      Breath sounds: Normal breath sounds. Abdominal:      General: Abdomen is flat. Bowel sounds are normal.      Palpations: Abdomen is soft. Musculoskeletal:         General: Normal range of motion. Cervical back: Normal range of motion and neck supple. Skin:     General: Skin is warm and dry. Capillary Refill: Capillary refill takes less than 2 seconds. Neurological:      General: No focal deficit present. Mental Status: She is alert and oriented to person, place, and time. Mental status is at baseline. Psychiatric:         Mood and Affect: Mood normal.         Behavior: Behavior normal.         Thought Content: Thought content normal.         Judgment: Judgment normal.         An electronic signature was used to authenticate this note.   -- Jorge Owens MD

## 2022-11-26 ENCOUNTER — APPOINTMENT (OUTPATIENT)
Dept: GENERAL RADIOLOGY | Age: 67
End: 2022-11-26
Attending: EMERGENCY MEDICINE
Payer: MEDICARE

## 2022-11-26 ENCOUNTER — HOSPITAL ENCOUNTER (EMERGENCY)
Age: 67
Discharge: HOME | End: 2022-11-26
Attending: EMERGENCY MEDICINE
Payer: MEDICARE

## 2022-11-26 VITALS
SYSTOLIC BLOOD PRESSURE: 122 MMHG | TEMPERATURE: 101.9 F | HEART RATE: 100 BPM | DIASTOLIC BLOOD PRESSURE: 97 MMHG | BODY MASS INDEX: 47.38 KG/M2 | HEIGHT: 65 IN | OXYGEN SATURATION: 95 % | WEIGHT: 284.39 LBS | RESPIRATION RATE: 16 BRPM

## 2022-11-26 DIAGNOSIS — J10.1 INFLUENZA A: Primary | ICD-10-CM

## 2022-11-26 LAB
FLUAV AG NPH QL IA: POSITIVE
FLUBV AG NOSE QL IA: NEGATIVE

## 2022-11-26 PROCEDURE — 74011250637 HC RX REV CODE- 250/637: Performed by: EMERGENCY MEDICINE

## 2022-11-26 PROCEDURE — 99283 EMERGENCY DEPT VISIT LOW MDM: CPT

## 2022-11-26 PROCEDURE — 87804 INFLUENZA ASSAY W/OPTIC: CPT

## 2022-11-26 PROCEDURE — 71046 X-RAY EXAM CHEST 2 VIEWS: CPT

## 2022-11-26 RX ORDER — IBUPROFEN 600 MG/1
600 TABLET ORAL
Status: COMPLETED | OUTPATIENT
Start: 2022-11-26 | End: 2022-11-26

## 2022-11-26 RX ORDER — IBUPROFEN 200 MG
400 TABLET ORAL
COMMUNITY

## 2022-11-26 RX ADMIN — IBUPROFEN 600 MG: 600 TABLET, FILM COATED ORAL at 11:16

## 2022-11-26 NOTE — ED TRIAGE NOTES
Pt ambulates to treatment area she states that since Thursday she has had a cough, fever, chills, body aches and when she coughs it hurts across the lower ribs and upper abdomen. She states that her grandson has been sick with the same thing.   She took Ibuprofen the other night but nothing since

## 2022-11-26 NOTE — ED NOTES
Pt given discharge instructions by Dr Jose Liu she verbalizes an understanding pt stable at time of discharge

## 2022-11-26 NOTE — ED PROVIDER NOTES
50-year-old female with a history of diabetes presents with a chief complaint of fever, cough and body aches since Thursday. She denies shortness of breath. Past Medical History:   Diagnosis Date    Depression     Diabetes ([address]     Gastrointestinal disorder     gerd    GERD (gastroesophageal reflux disease)     High cholesterol     Liver disease     elevated liver enzymes/fatty liver    Obese     uses CPAP    Other ill-defined conditions(799.89)     fibromyalgia    Psychiatric disorder     depression    PUD (peptic ulcer disease)     yrs ago    Sleep apnea     Thyroid disease        Past Surgical History:   Procedure Laterality Date    BIOPSY LIVER  2022    performed during gall bladder surgery, LBX done unknown to patient    COLONOSCOPY N/A 2022    COLONOSCOPY performed by Merary Oneil MD at [address] [place] [address] Drive       X 2    HX CHOLECYSTECTOMY      HX GYN      hysterectomy    HX LAP CHOLECYSTECTOMY  2021    HX SEPTOPLASTY      IR BX LIVER PERCUTANEOUS           Family History:   Problem Relation Age of Onset    Hypertension Mother     Heart Disease Father     Cancer Brother         ? where    Colon Cancer Maternal Grandmother     Cancer Maternal Grandmother         colon    Lung Cancer Maternal Grandfather     Emphysema Paternal Grandfather        Social History     Socioeconomic History    Marital status:      Spouse name: Not on file    Number of children: Not on file    Years of education: Not on file    Highest education level: Not on file   Occupational History    Not on file   Tobacco Use    Smoking status: Former    Smokeless tobacco: Never    Tobacco comments:     Quit 16y ago.    Vaping Use    Vaping Use: Never used   Substance and Sexual Activity    Alcohol use: Not Currently     Alcohol/week: 0.0 standard drinks     Comment: rarely     Drug use: Never    Sexual activity: Not Currently     Birth control/protection: None   Other Topics Concern    Not on file Social History Narrative    Not on file     Social Determinants of Health     Financial Resource Strain: Low Risk     Difficulty of Paying Living Expenses: Not hard at all   Food Insecurity: No Food Insecurity    Worried About Running Out of Food in the Last Year: Never true    Ran Out of Food in the Last Year: Never true   Transportation Needs: Not on file   Physical Activity: Not on file   Stress: Not on file   Social Connections: Not on file   Intimate Partner Violence: Not on file   Housing Stability: Not on file         ALLERGIES: Liraglutide, No allergy information available, No known drug allergies, and Pravastatin    Review of Systems   Constitutional:  Positive for fever. Respiratory:  Negative for shortness of breath. There were no vitals filed for this visit. Physical Exam  Vitals and nursing note reviewed. Constitutional:       General: She is not in acute distress. Appearance: Normal appearance. She is obese. She is not ill-appearing, toxic-appearing or diaphoretic. HENT:      Head: Normocephalic and atraumatic. Eyes:      Extraocular Movements: Extraocular movements intact. Cardiovascular:      Rate and Rhythm: Normal rate. Pulses: Normal pulses. Pulmonary:      Effort: Pulmonary effort is normal. No respiratory distress. Abdominal:      General: There is no distension. Musculoskeletal:         General: Normal range of motion. Cervical back: Normal range of motion. Skin:     General: Skin is dry. Neurological:      Mental Status: She is alert and oriented to person, place, and time. Psychiatric:         Mood and Affect: Mood normal.        MDM  Number of Diagnoses or Management Options  Influenza A  Diagnosis management comments:     Chest x-ray shows no pneumonia. Influenza positive. Discussed my clinical impression(s), any labs and/or radiology results with the patient. I answered any questions and addressed any concerns.  Discussed the importance of following up with their primary care physician and/or specialist(s). Discussed signs or symptoms that would warrant return back to the ER for further evaluation. The patient is agreeable with discharge.            Procedures

## 2022-12-20 ENCOUNTER — HOSPITAL ENCOUNTER (EMERGENCY)
Age: 67
Discharge: HOME | End: 2022-12-20
Attending: STUDENT IN AN ORGANIZED HEALTH CARE EDUCATION/TRAINING PROGRAM
Payer: MEDICARE

## 2022-12-20 VITALS
WEIGHT: 287.7 LBS | TEMPERATURE: 98.5 F | RESPIRATION RATE: 22 BRPM | DIASTOLIC BLOOD PRESSURE: 70 MMHG | HEART RATE: 77 BPM | SYSTOLIC BLOOD PRESSURE: 167 MMHG | BODY MASS INDEX: 47.93 KG/M2 | OXYGEN SATURATION: 98 % | HEIGHT: 65 IN

## 2022-12-20 DIAGNOSIS — J06.9 ACUTE UPPER RESPIRATORY INFECTION: Primary | ICD-10-CM

## 2022-12-20 DIAGNOSIS — J02.9 ACUTE PHARYNGITIS, UNSPECIFIED ETIOLOGY: ICD-10-CM

## 2022-12-20 LAB — DEPRECATED S PYO AG THROAT QL EIA: NEGATIVE

## 2022-12-20 PROCEDURE — 87070 CULTURE OTHR SPECIMN AEROBIC: CPT

## 2022-12-20 PROCEDURE — 96372 THER/PROPH/DIAG INJ SC/IM: CPT

## 2022-12-20 PROCEDURE — 87880 STREP A ASSAY W/OPTIC: CPT

## 2022-12-20 PROCEDURE — 99284 EMERGENCY DEPT VISIT MOD MDM: CPT

## 2022-12-20 PROCEDURE — 74011250636 HC RX REV CODE- 250/636: Performed by: STUDENT IN AN ORGANIZED HEALTH CARE EDUCATION/TRAINING PROGRAM

## 2022-12-20 RX ORDER — KETOROLAC TROMETHAMINE 30 MG/ML
30 INJECTION, SOLUTION INTRAMUSCULAR; INTRAVENOUS ONCE
Status: COMPLETED | OUTPATIENT
Start: 2022-12-20 | End: 2022-12-20

## 2022-12-20 RX ORDER — IBUPROFEN 800 MG/1
800 TABLET ORAL
Qty: 20 TABLET | Refills: 0 | Status: SHIPPED | OUTPATIENT
Start: 2022-12-20 | End: 2022-12-27

## 2022-12-20 RX ORDER — DEXAMETHASONE SODIUM PHOSPHATE 10 MG/ML
10 INJECTION INTRAMUSCULAR; INTRAVENOUS ONCE
Status: COMPLETED | OUTPATIENT
Start: 2022-12-20 | End: 2022-12-20

## 2022-12-20 RX ORDER — CETIRIZINE HYDROCHLORIDE 10 MG/1
10 TABLET ORAL
Qty: 14 TABLET | Refills: 0 | Status: SHIPPED | OUTPATIENT
Start: 2022-12-20 | End: 2023-01-03

## 2022-12-20 RX ORDER — FLUTICASONE PROPIONATE 50 MCG
2 SPRAY, SUSPENSION (ML) NASAL DAILY
Qty: 16 G | Refills: 3 | Status: SHIPPED | OUTPATIENT
Start: 2022-12-20

## 2022-12-20 RX ADMIN — KETOROLAC TROMETHAMINE 30 MG: 30 INJECTION, SOLUTION INTRAMUSCULAR; INTRAVENOUS at 19:32

## 2022-12-20 RX ADMIN — DEXAMETHASONE SODIUM PHOSPHATE 10 MG: 10 INJECTION, SOLUTION INTRAMUSCULAR; INTRAVENOUS at 19:31

## 2022-12-20 NOTE — ED TRIAGE NOTES
Pt ambulates to treatment area without any gait disturbances. Pt states that her throat has been hurting since Sunday. Pt also complains of congestion and headache.

## 2022-12-21 NOTE — ED PROVIDER NOTES
Chief Complaint   Patient presents with    Sore Throat    Nasal Congestion       INITIAL ASSESSMENT & PLAN   7:27 PM  Differential diagnosis includes but is not limited to pta, rpa, toño's, epidural abscess, lemierre's, strep, post-nasal drip resulting in pharyngitis    Given small amount of exudate vs tonsillith obtained strep swb which was neg  Dose of decadron here  Otherwise tolerating po quite well tolerating secretions  Likely 2/2 postnasal drip  For now rec conservative measures likely uri  Rx flonase, afrin, zyrtec  Advised ibuprofen for throat    F/up pcp      HISTORY OF PRESENT ILLNESS     Sore Throat     Nasal Congestion    78 yof presenting for c/c sore throat since   Pain worse with swallowing  Rated as moderate  No fevers  Congestion and runny noses with mild cough  Mild headache no vision changes, waxing/waning/intermittent, feels likely related to sore throat    REVIEW OF SYSTEMS  Review of Systems   HENT:  Positive for sore throat. I performed a 10-point review of systems which have been otherwise included in the HPI as documented, otherwise all other systems have been reviewed and are negative.     MEDICAL HISTORY  Past Medical History:   Diagnosis Date    Depression     Diabetes ([facility] [address]     Gastrointestinal disorder     gerd    GERD (gastroesophageal reflux disease)     High cholesterol     Liver disease     elevated liver enzymes/fatty liver    Obese     uses CPAP    Other ill-defined conditions(799.89)     fibromyalgia    Psychiatric disorder     depression    PUD (peptic ulcer disease)     yrs ago    Sleep apnea     Thyroid disease      Past Surgical History:   Procedure Laterality Date    BIOPSY LIVER  2022    performed during gall bladder surgery, LBX done unknown to patient    COLONOSCOPY N/A 2022    COLONOSCOPY performed by Carley Tracy MD at [address] [place] [address] Drive       X 2    HX CHOLECYSTECTOMY      HX GYN      hysterectomy    HX LAP CHOLECYSTECTOMY  2021 HX SEPTOPLASTY      IR BX LIVER PERCUTANEOUS       Family History:   Problem Relation Age of Onset    Hypertension Mother     Heart Disease Father     Cancer Brother         ? where    Colon Cancer Maternal Grandmother     Cancer Maternal Grandmother         colon    Lung Cancer Maternal Grandfather     Emphysema Paternal Grandfather      Social History     Tobacco Use    Smoking status: Former    Smokeless tobacco: Never    Tobacco comments:     Quit 16y ago. Vaping Use    Vaping Use: Never used   Substance Use Topics    Alcohol use: Not Currently     Alcohol/week: 0.0 standard drinks     Comment: rarely     Drug use: Never     ALLERGIES: Liraglutide, No allergy information available, No known drug allergies, and Pravastatin  Medications  No current facility-administered medications on file prior to encounter. Current Outpatient Medications on File Prior to Encounter   Medication Sig Dispense Refill    metFORMIN (GLUCOPHAGE) 500 mg tablet Take 1 Tablet by mouth two (2) times daily (with meals). 120 Tablet 0    escitalopram oxalate (LEXAPRO) 10 mg tablet Take 1 Tablet by mouth daily. 90 Tablet 1    Unithroid 200 mcg tablet Take one tablet daily on an empty stomach 90 Tablet 3    cyclobenzaprine (FLEXERIL) 5 mg tablet Take 1 Tablet by mouth nightly. (Patient not taking: Reported on 11/26/2022) 30 Tablet 0    telmisartan (MICARDIS) 20 mg tablet Take 1 Tablet by mouth daily. (Patient not taking: Reported on 11/26/2022) 30 Tablet 0       PHYSICAL EXAM     Vitals:    12/20/22 1858   BP: (!) 167/70   Pulse: 77   Resp: 22   Temp: 98.5 °F (36.9 °C)   SpO2: 98%   Weight: 130.5 kg (287 lb 11.2 oz)   Height: 5' 5\" (1.651 m)     Physical Exam  Vitals and nursing note reviewed. Constitutional:       General: She is not in acute distress. Appearance: She is well-developed. She is not ill-appearing. HENT:      Head: Normocephalic and atraumatic.       Right Ear: External ear normal.      Left Ear: External ear normal.      Nose: Congestion present. Mouth/Throat:      Mouth: Mucous membranes are moist.      Pharynx: Posterior oropharyngeal erythema present. No oropharyngeal exudate. Comments: Uvula midline no hot potato voice minimal scant exudate left posterior oropharynx  Eyes:      Extraocular Movements: Extraocular movements intact. Conjunctiva/sclera: Conjunctivae normal.   Cardiovascular:      Rate and Rhythm: Normal rate and regular rhythm. Pulses: Normal pulses. Heart sounds: No murmur heard. No friction rub. No gallop. Pulmonary:      Effort: Pulmonary effort is normal.      Breath sounds: No wheezing, rhonchi or rales. Abdominal:      General: There is no distension. Palpations: Abdomen is soft. Tenderness: There is no abdominal tenderness. There is no guarding or rebound. Musculoskeletal:         General: No swelling, tenderness or deformity. Normal range of motion. Cervical back: No rigidity. Lymphadenopathy:      Cervical: No cervical adenopathy. Skin:     General: Skin is warm. Capillary Refill: Capillary refill takes less than 2 seconds. Coloration: Skin is not jaundiced. Findings: No rash. Neurological:      General: No focal deficit present. Mental Status: She is alert and oriented to person, place, and time. Motor: No weakness.        DIAGNOSTIC STUDIES     Laboratory Results:  Recent Results (from the past 24 hour(s))   STREP AG SCREEN, GROUP A    Collection Time: 12/20/22  7:35 PM    Specimen: Swab; Throat   Result Value Ref Range    Group A Strep Ag ID Negative NEG       Imaging Results:  No orders to display       ED COURSE        PROCEDURES  Procedures    Medications Ordered/Administered in ED  Medications   dexamethasone (PF) (DECADRON) 10 mg/mL injection 10 mg (10 mg IntraMUSCular Given 12/20/22 1931)   ketorolac (TORADOL) injection 30 mg (30 mg IntraMUSCular Given 12/20/22 1932)       FINAL ASSESSMENT AND DISPOSITION ED DIAGNOSIS  1. Acute upper respiratory infection    2.  Acute pharyngitis, unspecified etiology        DISPOSITION  Discharged    Labs/Imaging Studies Ordered/Performed in ED  Orders Placed This Encounter    STREP AG SCREEN, GROUP A    CULTURE, THROAT    dexamethasone (PF) (DECADRON) 10 mg/mL injection 10 mg    ketorolac (TORADOL) injection 30 mg    ibuprofen (MOTRIN) 800 mg tablet    aluminum-magnesium hydroxide 200-200 mg/5 mL susp 5 mL, diphenhydrAMINE 12.5 mg/5 mL liqd 12.5 mg, lidocaine 2 % soln 5 mL    fluticasone propionate (FLONASE) 50 mcg/actuation nasal spray    cetirizine (ZyrTEC) 10 mg tablet    oxymetazoline (AFRIN) 0.05 % nasal mist       Electronically signed by

## 2022-12-22 LAB
BACTERIA SPEC CULT: NORMAL
SERVICE CMNT-IMP: NORMAL

## 2022-12-27 ENCOUNTER — OFFICE VISIT (OUTPATIENT)
Dept: FAMILY MEDICINE CLINIC | Age: 67
End: 2022-12-27
Payer: MEDICARE

## 2022-12-27 ENCOUNTER — VIRTUAL VISIT (OUTPATIENT)
Dept: FAMILY MEDICINE CLINIC | Age: 67
End: 2022-12-27
Payer: MEDICARE

## 2022-12-27 DIAGNOSIS — J02.9 PHARYNGITIS, UNSPECIFIED ETIOLOGY: ICD-10-CM

## 2022-12-27 DIAGNOSIS — J01.90 SUBACUTE SINUSITIS, UNSPECIFIED LOCATION: Primary | ICD-10-CM

## 2022-12-27 PROCEDURE — 3017F COLORECTAL CA SCREEN DOC REV: CPT | Performed by: FAMILY MEDICINE

## 2022-12-27 PROCEDURE — 99213 OFFICE O/P EST LOW 20 MIN: CPT | Performed by: FAMILY MEDICINE

## 2022-12-27 PROCEDURE — 1101F PT FALLS ASSESS-DOCD LE1/YR: CPT | Performed by: FAMILY MEDICINE

## 2022-12-27 PROCEDURE — G9717 DOC PT DX DEP/BP F/U NT REQ: HCPCS | Performed by: FAMILY MEDICINE

## 2022-12-27 PROCEDURE — G8427 DOCREV CUR MEDS BY ELIG CLIN: HCPCS | Performed by: FAMILY MEDICINE

## 2022-12-27 PROCEDURE — G0463 HOSPITAL OUTPT CLINIC VISIT: HCPCS | Performed by: FAMILY MEDICINE

## 2022-12-27 PROCEDURE — 1090F PRES/ABSN URINE INCON ASSESS: CPT | Performed by: FAMILY MEDICINE

## 2022-12-27 PROCEDURE — G8400 PT W/DXA NO RESULTS DOC: HCPCS | Performed by: FAMILY MEDICINE

## 2022-12-27 PROCEDURE — G9899 SCRN MAM PERF RSLTS DOC: HCPCS | Performed by: FAMILY MEDICINE

## 2022-12-27 PROCEDURE — 1123F ACP DISCUSS/DSCN MKR DOCD: CPT | Performed by: FAMILY MEDICINE

## 2022-12-27 RX ORDER — DOXEPIN HYDROCHLORIDE 25 MG/1
25 CAPSULE ORAL
COMMUNITY

## 2022-12-27 RX ORDER — AMOXICILLIN AND CLAVULANATE POTASSIUM 875; 125 MG/1; MG/1
1 TABLET, FILM COATED ORAL 2 TIMES DAILY
Qty: 20 TABLET | Refills: 0 | Status: SHIPPED | OUTPATIENT
Start: 2022-12-27 | End: 2023-01-06

## 2022-12-27 RX ORDER — LAMOTRIGINE 150 MG/1
TABLET ORAL
COMMUNITY
End: 2022-12-27

## 2022-12-27 NOTE — PROGRESS NOTES
Edwin Garcia is a 78 y.o. female who was seen by synchronous (real-time) audio-video technology on 12/27/2022 for URI (ST, cough, chills. COVID test negative.)        Assessment & Plan:   Diagnoses and all orders for this visit:    1. Subacute sinusitis, unspecified location  -     amoxicillin-clavulanate (AUGMENTIN) 875-125 mg per tablet; Take 1 Tablet by mouth two (2) times a day for 10 days. 2. Pharyngitis, unspecified etiology  -     amoxicillin-clavulanate (AUGMENTIN) 875-125 mg per tablet; Take 1 Tablet by mouth two (2) times a day for 10 days. FU with Dr Kandy Herrera as scheduled 1/18/23. I spent at least 6 minutes on this visit with this established patient. 712  Subjective:       Prior to Admission medications    Medication Sig Start Date End Date Taking? Authorizing Provider   lamoTRIgine (LaMICtal) 150 mg tablet 1 tablet  Patient not taking: Reported on 12/27/2022    Provider, Historical   doxepin (SINEquan) 25 mg capsule Take 25 mg by mouth nightly. Provider, Historical   ibuprofen (MOTRIN) 800 mg tablet Take 1 Tablet by mouth every six (6) hours as needed for Pain for up to 7 days. Patient not taking: Reported on 12/27/2022 12/20/22 12/27/22  Miguelina Chan DO   aluminum-magnesium hydroxide 200-200 mg/5 mL susp 5 mL, diphenhydrAMINE 12.5 mg/5 mL liqd 12.5 mg, lidocaine 2 % soln 5 mL 5 mL by Swish and Spit route two (2) times a day for 10 days. Magic mouth wash   Maalox  Lidocaine 2% viscous   Diphenhydramine oral solution     Pharmacy to mix equal portions of ingredients to a total volume as indicated in the dispense amount. Patient not taking: Reported on 12/27/2022 12/20/22 12/30/22  Miguelina Chan DO   fluticasone propionate (FLONASE) 50 mcg/actuation nasal spray 2 Sprays by Both Nostrils route daily. Patient not taking: Reported on 12/27/2022 12/20/22   Miguelina Chan DO   cetirizine (ZyrTEC) 10 mg tablet Take 1 Tablet by mouth nightly for 14 days.   Patient not taking: Reported on 12/27/2022 12/20/22 1/3/23  Sari Zuritar,    metFORMIN (GLUCOPHAGE) 500 mg tablet Take 1 Tablet by mouth two (2) times daily (with meals). Patient not taking: Reported on 12/27/2022 11/18/22   Becky Tucker MD   cyclobenzaprine (FLEXERIL) 5 mg tablet Take 1 Tablet by mouth nightly. Patient not taking: No sig reported 11/18/22   Becky Tucker MD   escitalopram oxalate (LEXAPRO) 10 mg tablet Take 1 Tablet by mouth daily. Patient not taking: Reported on 12/27/2022 10/28/22   Becky Tucker MD   telmisartan (MICARDIS) 20 mg tablet Take 1 Tablet by mouth daily. Patient not taking: No sig reported 10/28/22   Becky Tucker MD   Unithroid 200 mcg tablet Take one tablet daily on an empty stomach  Patient not taking: Reported on 12/27/2022 7/13/22   Kelsey Mcdaniel MD     Patient Active Problem List    Diagnosis Date Noted    Growth hormone deficiency ([facility] 03/18/2021    Vitamin D deficiency 03/18/2021    Obesity, morbid ([facility] [address] 06/28/2018    S/P TOMASA (total abdominal hysterectomy) 04/19/2016    Type II diabetes mellitus ([facility] [address] 04/19/2016    Essential hypertension 03/23/2016    Hypercholesterolemia 03/23/2016    Sleep apnea 07/01/2014    MOFFETT (nonalcoholic steatohepatitis) 06/28/2014    Lung nodule seen on imaging study 06/28/2014    Obesity 09/05/2011    Hypothyroidism 09/04/2011    Depression 09/04/2011    GERD 09/04/2011    Fibromyalgia 09/04/2011     Current Outpatient Medications   Medication Sig Dispense Refill    amoxicillin-clavulanate (AUGMENTIN) 875-125 mg per tablet Take 1 Tablet by mouth two (2) times a day for 10 days. 20 Tablet 0    lamoTRIgine (LaMICtal) 150 mg tablet 1 tablet (Patient not taking: Reported on 12/27/2022)      doxepin (SINEquan) 25 mg capsule Take 25 mg by mouth nightly. ibuprofen (MOTRIN) 800 mg tablet Take 1 Tablet by mouth every six (6) hours as needed for Pain for up to 7 days.  (Patient not taking: Reported on 12/27/2022) 20 Tablet 0    aluminum-magnesium hydroxide 200-200 mg/5 mL susp 5 mL, diphenhydrAMINE 12.5 mg/5 mL liqd 12.5 mg, lidocaine 2 % soln 5 mL 5 mL by Swish and Spit route two (2) times a day for 10 days. Magic mouth wash   Maalox  Lidocaine 2% viscous   Diphenhydramine oral solution     Pharmacy to mix equal portions of ingredients to a total volume as indicated in the dispense amount. (Patient not taking: Reported on 12/27/2022) 20 mL 0    fluticasone propionate (FLONASE) 50 mcg/actuation nasal spray 2 Sprays by Both Nostrils route daily. (Patient not taking: Reported on 12/27/2022) 16 g 3    cetirizine (ZyrTEC) 10 mg tablet Take 1 Tablet by mouth nightly for 14 days. (Patient not taking: Reported on 12/27/2022) 14 Tablet 0    metFORMIN (GLUCOPHAGE) 500 mg tablet Take 1 Tablet by mouth two (2) times daily (with meals). (Patient not taking: Reported on 12/27/2022) 120 Tablet 0    cyclobenzaprine (FLEXERIL) 5 mg tablet Take 1 Tablet by mouth nightly. (Patient not taking: No sig reported) 30 Tablet 0    escitalopram oxalate (LEXAPRO) 10 mg tablet Take 1 Tablet by mouth daily. (Patient not taking: Reported on 12/27/2022) 90 Tablet 1    telmisartan (MICARDIS) 20 mg tablet Take 1 Tablet by mouth daily. (Patient not taking: No sig reported) 30 Tablet 0    Unithroid 200 mcg tablet Take one tablet daily on an empty stomach (Patient not taking: Reported on 12/27/2022) 90 Tablet 3     Allergies   Allergen Reactions    Liraglutide Nausea and Vomiting    No Allergy Information Available Other (comments)     Pt. Is allergic to Liraglutide    No Known Drug Allergies Unknown (comments)     Pt. Is allergic to Liraglutide    Pravastatin Other (comments)     Joint pain. ROS  Pt had Flu in late Nov. In past week has had bad ST, some SOB, dry cough, nasal congestion. No fever. COVID home test neg today. She was seen in ER 12/20 and given steroids. Pt has not filled some of her meds prescribed by Dr Navid Moura due to high cost and not covered by her insurance.     Objective:   No flowsheet data found. General: alert, cooperative, no distress   Mental  status: normal mood, behavior, speech, dress, motor activity, and thought processes, able to follow commands   HENT: NCAT   Neck: no visualized mass   Resp: no respiratory distress   Neuro: no gross deficits   Skin: no discoloration or lesions of concern on visible areas   Psychiatric: normal affect, consistent with stated mood, no evidence of hallucinations     Additional exam findings: We discussed the expected course, resolution and complications of the diagnosis(es) in detail. Medication risks, benefits, costs, interactions, and alternatives were discussed as indicated. I advised her to contact the office if her condition worsens, changes or fails to improve as anticipated. She expressed understanding with the diagnosis(es) and plan. Renetta Sheffield, was evaluated through a synchronous (real-time) audio-video encounter. The patient (or guardian if applicable) is aware that this is a billable service, which includes applicable co-pays. This Virtual Visit was conducted with patient's (and/or legal guardian's) consent. The visit was conducted pursuant to the emergency declaration under the [facility], [address] [place] [address] waiver authority and the [employer] and [employer] General Act. Patient identification was verified, and a caregiver was present when appropriate. The patient was located at: Home: [address] [place] [address] Road [id number]  The provider was located at:  Facility (Appt Department): [address] [place] [address]        Ya Glaser MD

## 2022-12-27 NOTE — PROGRESS NOTES
Identified pt with two pt identifiers(name and ).     Chief Complaint   Patient presents with    Sore Throat    Shortness of Breath    Nasal Congestion     Negative Covid Test today  Symptoms started     Headache        Health Maintenance Due   Topic    COVID-19 Vaccine (1)    Pneumococcal 65+ years (1 - PCV)    Shingles Vaccine (1 of 2)    Foot Exam Q1     Bone Densitometry (Dexa) Screening        Wt Readings from Last 3 Encounters:   22 287 lb 11.2 oz (130.5 kg)   22 284 lb 6.3 oz (129 kg)   22 284 lb (128.8 kg)     Temp Readings from Last 3 Encounters:   22 98.5 °F (36.9 °C)   22 (!) 101.9 °F (38.8 °C)   22 97.1 °F (36.2 °C) (Temporal)     BP Readings from Last 3 Encounters:   22 (!) 167/70   22 (!) 122/97   22 136/72     Pulse Readings from Last 3 Encounters:   22 77   22 100   22 77         Learning Assessment:  :     Learning Assessment 2014   PRIMARY LEARNER Patient   HIGHEST LEVEL OF EDUCATION - PRIMARY LEARNER  DID NOT GRADUATE HIGH SCHOOL   BARRIERS PRIMARY LEARNER NONE   CO-LEARNER CAREGIVER No   PRIMARY LANGUAGE ENGLISH   LEARNER PREFERENCE PRIMARY OTHER (COMMENT)   ANSWERED BY patient   RELATIONSHIP SELF       Depression Screening:  :     3 most recent PHQ Screens 2022   PHQ Not Done -   Little interest or pleasure in doing things More than half the days   Feeling down, depressed, irritable, or hopeless More than half the days   Total Score PHQ 2 4   Trouble falling or staying asleep, or sleeping too much Not at all   Feeling tired or having little energy Nearly every day   Poor appetite, weight loss, or overeating Not at all   Feeling bad about yourself - or that you are a failure or have let yourself or your family down More than half the days   Trouble concentrating on things such as school, work, reading, or watching TV Several days   Moving or speaking so slowly that other people could have noticed; or the opposite being so fidgety that others notice Not at all   Thoughts of being better off dead, or hurting yourself in some way Not at all   PHQ 9 Score 10   How difficult have these problems made it for you to do your work, take care of your home and get along with others Somewhat difficult       Fall Risk Assessment:  :     Fall Risk Assessment, last 12 mths 3/10/2022   Able to walk? Yes   Fall in past 12 months? 0   Do you feel unsteady? 0   Are you worried about falling 0       Abuse Screening:  :     Abuse Screening Questionnaire 12/27/2022 11/18/2022 5/19/2022 3/18/2021 1/8/2020 10/1/2018 10/11/2017   Do you ever feel afraid of your partner? N N N N N N N   Are you in a relationship with someone who physically or mentally threatens you? N N N N N N N   Is it safe for you to go home? Y Y Y Y Y Y Y       Coordination of Care Questionnaire:  :     1) Have you been to an emergency room, urgent care clinic since your last visit? no   Hospitalized since your last visit? no             2) Have you seen or consulted any other health care providers outside of [address] [place] [address] since your last visit? no  (Include any pap smears or colon screenings in this section.)    3) Do you have an Advance Directive on file? no  Are you interested in receiving information about Advance Directives? no    Patient is accompanied by self I have received verbal consent from [facility] to discuss any/all medical information while they are present in the room. 4.  For patients aged 39-70: Has the patient had a colonoscopy / FIT/ Cologuard? NA - based on age      If the patient is female:    11. For patients aged 41-77: Has the patient had a mammogram within the past 2 years? NA - based on age or sex      10. For patients aged 21-65: Has the patient had a pap smear? NA - based on age or sex      Patient got up and stated that she could not wear her mask any longer, got up and left building.

## 2023-01-18 ENCOUNTER — OFFICE VISIT (OUTPATIENT)
Dept: FAMILY MEDICINE CLINIC | Age: 68
End: 2023-01-18
Payer: MEDICARE

## 2023-01-18 VITALS
OXYGEN SATURATION: 98 % | HEIGHT: 66 IN | RESPIRATION RATE: 17 BRPM | HEART RATE: 88 BPM | WEIGHT: 281 LBS | DIASTOLIC BLOOD PRESSURE: 76 MMHG | BODY MASS INDEX: 45.16 KG/M2 | TEMPERATURE: 97.4 F | SYSTOLIC BLOOD PRESSURE: 142 MMHG

## 2023-01-18 DIAGNOSIS — K75.81 NASH (NONALCOHOLIC STEATOHEPATITIS): ICD-10-CM

## 2023-01-18 DIAGNOSIS — E66.01 MORBID OBESITY WITH BMI OF 45.0-49.9, ADULT (HCC): ICD-10-CM

## 2023-01-18 DIAGNOSIS — E11.9 TYPE 2 DIABETES MELLITUS WITHOUT COMPLICATION, WITHOUT LONG-TERM CURRENT USE OF INSULIN (HCC): ICD-10-CM

## 2023-01-18 DIAGNOSIS — F33.1 MODERATE EPISODE OF RECURRENT MAJOR DEPRESSIVE DISORDER (HCC): Primary | ICD-10-CM

## 2023-01-18 DIAGNOSIS — E23.0 ADULT GROWTH HORMONE DEFICIENCY (HCC): ICD-10-CM

## 2023-01-18 PROCEDURE — 1090F PRES/ABSN URINE INCON ASSESS: CPT | Performed by: FAMILY MEDICINE

## 2023-01-18 PROCEDURE — 1123F ACP DISCUSS/DSCN MKR DOCD: CPT | Performed by: FAMILY MEDICINE

## 2023-01-18 PROCEDURE — G8417 CALC BMI ABV UP PARAM F/U: HCPCS | Performed by: FAMILY MEDICINE

## 2023-01-18 PROCEDURE — 2022F DILAT RTA XM EVC RTNOPTHY: CPT | Performed by: FAMILY MEDICINE

## 2023-01-18 PROCEDURE — 99213 OFFICE O/P EST LOW 20 MIN: CPT | Performed by: FAMILY MEDICINE

## 2023-01-18 PROCEDURE — G9717 DOC PT DX DEP/BP F/U NT REQ: HCPCS | Performed by: FAMILY MEDICINE

## 2023-01-18 PROCEDURE — G0463 HOSPITAL OUTPT CLINIC VISIT: HCPCS | Performed by: FAMILY MEDICINE

## 2023-01-18 PROCEDURE — G8400 PT W/DXA NO RESULTS DOC: HCPCS | Performed by: FAMILY MEDICINE

## 2023-01-18 PROCEDURE — 3017F COLORECTAL CA SCREEN DOC REV: CPT | Performed by: FAMILY MEDICINE

## 2023-01-18 PROCEDURE — G8427 DOCREV CUR MEDS BY ELIG CLIN: HCPCS | Performed by: FAMILY MEDICINE

## 2023-01-18 PROCEDURE — 3074F SYST BP LT 130 MM HG: CPT | Performed by: FAMILY MEDICINE

## 2023-01-18 PROCEDURE — 3078F DIAST BP <80 MM HG: CPT | Performed by: FAMILY MEDICINE

## 2023-01-18 PROCEDURE — G8536 NO DOC ELDER MAL SCRN: HCPCS | Performed by: FAMILY MEDICINE

## 2023-01-18 PROCEDURE — 3046F HEMOGLOBIN A1C LEVEL >9.0%: CPT | Performed by: FAMILY MEDICINE

## 2023-01-18 PROCEDURE — 1101F PT FALLS ASSESS-DOCD LE1/YR: CPT | Performed by: FAMILY MEDICINE

## 2023-01-18 RX ORDER — ESCITALOPRAM OXALATE 10 MG/1
10 TABLET ORAL DAILY
Qty: 90 TABLET | Refills: 1 | Status: SHIPPED | OUTPATIENT
Start: 2023-01-18

## 2023-01-18 RX ORDER — ERGOCALCIFEROL 1.25 MG/1
50000 CAPSULE ORAL
Qty: 12 CAPSULE | Refills: 0 | Status: SHIPPED | OUTPATIENT
Start: 2023-01-18

## 2023-01-18 RX ORDER — METFORMIN HYDROCHLORIDE 500 MG/1
500 TABLET ORAL 2 TIMES DAILY WITH MEALS
Qty: 120 TABLET | Refills: 0 | Status: SHIPPED | OUTPATIENT
Start: 2023-01-18

## 2023-01-18 RX ORDER — TELMISARTAN 20 MG/1
20 TABLET ORAL DAILY
Qty: 30 TABLET | Refills: 0 | Status: SHIPPED | OUTPATIENT
Start: 2023-01-18

## 2023-01-18 NOTE — PROGRESS NOTES
Identified pt with two pt identifiers(name and ).     Chief Complaint   Patient presents with    Medication Refill     Patient needs refills on medication she was not able to afford medications so has not been on her lexapro for a while and is needing refills on all medications except thyroid     Follow-up     Patient is here for follow up         Health Maintenance Due   Topic    COVID-19 Vaccine (1)    Pneumococcal 65+ years (1 - PCV)    Shingles Vaccine (1 of 2)    Foot Exam Q1     Bone Densitometry (Dexa) Screening        Wt Readings from Last 3 Encounters:   22 287 lb 11.2 oz (130.5 kg)   22 284 lb 6.3 oz (129 kg)   22 284 lb (128.8 kg)     Temp Readings from Last 3 Encounters:   22 98.5 °F (36.9 °C)   22 (!) 101.9 °F (38.8 °C)   22 97.1 °F (36.2 °C) (Temporal)     BP Readings from Last 3 Encounters:   22 (!) 167/70   22 (!) 122/97   22 136/72     Pulse Readings from Last 3 Encounters:   22 77   22 100   22 77         Learning Assessment:  :     Learning Assessment 2014   PRIMARY LEARNER Patient   HIGHEST LEVEL OF EDUCATION - PRIMARY LEARNER  DID NOT GRADUATE HIGH SCHOOL   BARRIERS PRIMARY LEARNER NONE   CO-LEARNER CAREGIVER No   PRIMARY LANGUAGE ENGLISH   LEARNER PREFERENCE PRIMARY OTHER (COMMENT)   ANSWERED BY patient   RELATIONSHIP SELF       Depression Screening:  :     3 most recent PHQ Screens 2023   PHQ Not Done -   Little interest or pleasure in doing things Nearly every day   Feeling down, depressed, irritable, or hopeless Nearly every day   Total Score PHQ 2 6   Trouble falling or staying asleep, or sleeping too much More than half the days   Feeling tired or having little energy Nearly every day   Poor appetite, weight loss, or overeating Not at all   Feeling bad about yourself - or that you are a failure or have let yourself or your family down Nearly every day   Trouble concentrating on things such as school, work, reading, or watching TV Nearly every day   Moving or speaking so slowly that other people could have noticed; or the opposite being so fidgety that others notice Several days   Thoughts of being better off dead, or hurting yourself in some way More than half the days   PHQ 9 Score 20   How difficult have these problems made it for you to do your work, take care of your home and get along with others Very difficult       Fall Risk Assessment:  :     Fall Risk Assessment, last 12 mths 1/18/2023   Able to walk? Yes   Fall in past 12 months? 0   Do you feel unsteady? 0   Are you worried about falling 0       Abuse Screening:  :     Abuse Screening Questionnaire 12/27/2022 11/18/2022 5/19/2022 3/18/2021 1/8/2020 10/1/2018 10/11/2017   Do you ever feel afraid of your partner? N N N N N N N   Are you in a relationship with someone who physically or mentally threatens you? N N N N N N N   Is it safe for you to go home? Y Y Y Y Y Y Y       Coordination of Care Questionnaire:  :     1) Have you been to an emergency room, urgent care clinic since your last visit? no   Hospitalized since your last visit? no             2) Have you seen or consulted any other health care providers outside of [address] [place] [address] since your last visit? no  (Include any pap smears or colon screenings in this section.)    3) Do you have an Advance Directive on file? no  Are you interested in receiving information about Advance Directives? no    Patient is accompanied by self I have received verbal consent from Libra Moss to discuss any/all medical information while they are present in the room. 4.  For patients aged 39-70: Has the patient had a colonoscopy / FIT/ Cologuard? Yes - no Care Gap present      If the patient is female:    5. For patients aged 41-77: Has the patient had a mammogram within the past 2 years? Yes - no Care Gap present      6. For patients aged 21-65: Has the patient had a pap smear?  NA - based on age or sex

## 2023-01-19 NOTE — PROGRESS NOTES
Lakhwinder Eugene (: 1955) is a 78 y.o. female, new patient, here for evaluation of the following chief complaint(s):  Medication Refill (Patient needs refills on medication she was not able to afford medications so has not been on her lexapro for a while and is needing refills on all medications except thyroid ) and Follow-up (Patient is here for follow up )     After medication check, we stopped doxepin. ASSESSMENT/PLAN:  Below is the assessment and plan developed based on review of pertinent history, physical exam, labs, studies, and medications. 1. Moderate episode of recurrent major depressive disorder (HCC)  -     escitalopram oxalate (LEXAPRO) 10 mg tablet; Take 1 Tablet by mouth daily. , Normal, Disp-90 Tablet, R-1  -     ergocalciferol (ERGOCALCIFEROL) 1,250 mcg (50,000 unit) capsule; Take 1 Capsule by mouth every seven (7) days. , Normal, Disp-12 Capsule, R-0  2. Morbid obesity with BMI of 45.0-49.9, adult (HCC)  -     ergocalciferol (ERGOCALCIFEROL) 1,250 mcg (50,000 unit) capsule; Take 1 Capsule by mouth every seven (7) days. , Normal, Disp-12 Capsule, R-0  3. Adult growth hormone deficiency (HCC)  -     telmisartan (MICARDIS) 20 mg tablet; Take 1 Tablet by mouth daily. , Normal, Disp-30 Tablet, R-0  4. Type 2 diabetes mellitus without complication, without long-term current use of insulin (HCC)  -     telmisartan (MICARDIS) 20 mg tablet; Take 1 Tablet by mouth daily. , Normal, Disp-30 Tablet, R-0  5. MOFFETT (nonalcoholic steatohepatitis)  -     metFORMIN (GLUCOPHAGE) 500 mg tablet; Take 1 Tablet by mouth two (2) times daily (with meals). , Normal, Disp-120 Tablet, R-0  -     telmisartan (MICARDIS) 20 mg tablet; Take 1 Tablet by mouth daily. , Normal, Disp-30 Tablet, R-0      No follow-ups on file. SUBJECTIVE/OBJECTIVE:  Lakhwinder Eugene is a 78 y.o. 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  Patient also would like medication refills. Hasn't taken her blood pressure medications, we discussed the use of telmisartan for blood pressure. Review of Systems   Constitutional:  Negative for chills, diaphoresis, fatigue and unexpected weight change. HENT:  Negative for congestion, ear pain, facial swelling and rhinorrhea. Eyes: Negative. Respiratory: Negative. Cardiovascular:  Negative for chest pain, palpitations and leg swelling. Gastrointestinal:  Negative for abdominal pain, constipation and diarrhea. Endocrine: Negative. Genitourinary: Negative. Negative for difficulty urinating. Musculoskeletal: Negative. Skin: Negative. Allergic/Immunologic: Negative. Neurological: Negative. Negative for dizziness, speech difficulty, weakness and light-headedness. Hematological: Negative. Psychiatric/Behavioral: Negative. Physical Exam  Constitutional:       Appearance: She is obese. HENT:      Head: Normocephalic and atraumatic. Right Ear: Tympanic membrane, ear canal and external ear normal.      Left Ear: Tympanic membrane, ear canal and external ear normal.      Nose: Nose normal.      Mouth/Throat:      Mouth: Mucous membranes are moist.      Pharynx: Oropharynx is clear. Eyes:      Extraocular Movements: Extraocular movements intact. Conjunctiva/sclera: Conjunctivae normal.      Pupils: Pupils are equal, round, and reactive to light. Cardiovascular:      Rate and Rhythm: Normal rate and regular rhythm. Pulses: Normal pulses. Heart sounds: Normal heart sounds. Pulmonary:      Effort: Pulmonary effort is normal.      Breath sounds: Normal breath sounds. Abdominal:      General: Abdomen is flat. Bowel sounds are normal.      Palpations: Abdomen is soft. Musculoskeletal:         General: Normal range of motion. Cervical back: Normal range of motion and neck supple. Skin:     General: Skin is warm and dry.       Capillary Refill: Capillary refill takes less than 2 seconds. Neurological:      General: No focal deficit present. Mental Status: She is alert and oriented to person, place, and time. Mental status is at baseline. Psychiatric:         Mood and Affect: Mood normal.         Behavior: Behavior normal.         Thought Content: Thought content normal.         Judgment: Judgment normal.         An electronic signature was used to authenticate this note.   -- Tali Forbes MD

## 2023-02-14 DIAGNOSIS — K75.81 NASH (NONALCOHOLIC STEATOHEPATITIS): ICD-10-CM

## 2023-02-14 DIAGNOSIS — E11.9 TYPE 2 DIABETES MELLITUS WITHOUT COMPLICATION, WITHOUT LONG-TERM CURRENT USE OF INSULIN (HCC): ICD-10-CM

## 2023-02-14 DIAGNOSIS — E23.0 ADULT GROWTH HORMONE DEFICIENCY (HCC): ICD-10-CM

## 2023-02-14 RX ORDER — TELMISARTAN 20 MG/1
TABLET ORAL
Qty: 30 TABLET | Refills: 0 | OUTPATIENT
Start: 2023-02-14

## 2023-03-14 ENCOUNTER — OFFICE VISIT (OUTPATIENT)
Dept: FAMILY MEDICINE CLINIC | Age: 68
End: 2023-03-14
Payer: MEDICARE

## 2023-03-14 VITALS
WEIGHT: 275.6 LBS | DIASTOLIC BLOOD PRESSURE: 76 MMHG | OXYGEN SATURATION: 99 % | SYSTOLIC BLOOD PRESSURE: 139 MMHG | BODY MASS INDEX: 45.92 KG/M2 | HEART RATE: 80 BPM | TEMPERATURE: 98.6 F | RESPIRATION RATE: 17 BRPM | HEIGHT: 65 IN

## 2023-03-14 DIAGNOSIS — K75.81 NASH (NONALCOHOLIC STEATOHEPATITIS): ICD-10-CM

## 2023-03-14 DIAGNOSIS — E23.0 ADULT GROWTH HORMONE DEFICIENCY (HCC): ICD-10-CM

## 2023-03-14 DIAGNOSIS — E11.9 TYPE 2 DIABETES MELLITUS WITHOUT COMPLICATION, WITHOUT LONG-TERM CURRENT USE OF INSULIN (HCC): Primary | ICD-10-CM

## 2023-03-14 PROCEDURE — G8427 DOCREV CUR MEDS BY ELIG CLIN: HCPCS | Performed by: FAMILY MEDICINE

## 2023-03-14 PROCEDURE — G8536 NO DOC ELDER MAL SCRN: HCPCS | Performed by: FAMILY MEDICINE

## 2023-03-14 PROCEDURE — 1101F PT FALLS ASSESS-DOCD LE1/YR: CPT | Performed by: FAMILY MEDICINE

## 2023-03-14 PROCEDURE — 99213 OFFICE O/P EST LOW 20 MIN: CPT | Performed by: FAMILY MEDICINE

## 2023-03-14 PROCEDURE — 1123F ACP DISCUSS/DSCN MKR DOCD: CPT | Performed by: FAMILY MEDICINE

## 2023-03-14 PROCEDURE — G8400 PT W/DXA NO RESULTS DOC: HCPCS | Performed by: FAMILY MEDICINE

## 2023-03-14 PROCEDURE — 3046F HEMOGLOBIN A1C LEVEL >9.0%: CPT | Performed by: FAMILY MEDICINE

## 2023-03-14 PROCEDURE — 3074F SYST BP LT 130 MM HG: CPT | Performed by: FAMILY MEDICINE

## 2023-03-14 PROCEDURE — G9717 DOC PT DX DEP/BP F/U NT REQ: HCPCS | Performed by: FAMILY MEDICINE

## 2023-03-14 PROCEDURE — 3078F DIAST BP <80 MM HG: CPT | Performed by: FAMILY MEDICINE

## 2023-03-14 PROCEDURE — 1090F PRES/ABSN URINE INCON ASSESS: CPT | Performed by: FAMILY MEDICINE

## 2023-03-14 PROCEDURE — 2022F DILAT RTA XM EVC RTNOPTHY: CPT | Performed by: FAMILY MEDICINE

## 2023-03-14 PROCEDURE — 3017F COLORECTAL CA SCREEN DOC REV: CPT | Performed by: FAMILY MEDICINE

## 2023-03-14 PROCEDURE — G0463 HOSPITAL OUTPT CLINIC VISIT: HCPCS | Performed by: FAMILY MEDICINE

## 2023-03-14 PROCEDURE — G8417 CALC BMI ABV UP PARAM F/U: HCPCS | Performed by: FAMILY MEDICINE

## 2023-03-14 RX ORDER — DOXEPIN HYDROCHLORIDE 10 MG/1
10 CAPSULE ORAL
Qty: 30 CAPSULE | Refills: 0 | Status: SHIPPED | OUTPATIENT
Start: 2023-03-14

## 2023-03-14 RX ORDER — TELMISARTAN 20 MG/1
20 TABLET ORAL DAILY
Qty: 30 TABLET | Refills: 3 | Status: SHIPPED | OUTPATIENT
Start: 2023-03-14

## 2023-03-14 NOTE — PROGRESS NOTES
Identified pt with two pt identifiers(name and ).     Chief Complaint   Patient presents with    Follow-up     Patient is here for follow up on medications     Medication Refill     Patient needs medication refills and wants to discuss doxepin, she was taken off medication and she is itching really bad to where it is becoming unbearable         Health Maintenance Due   Topic    COVID-19 Vaccine (1)    Pneumococcal 65+ years (1 - PCV)    Shingles Vaccine (1 of 2)    Foot Exam Q1     Bone Densitometry (Dexa) Screening     Eye Exam Retinal or Dilated        Wt Readings from Last 3 Encounters:   23 275 lb 9.6 oz (125 kg)   23 281 lb (127.5 kg)   22 287 lb 11.2 oz (130.5 kg)     Temp Readings from Last 3 Encounters:   23 98.6 °F (37 °C) (Temporal)   23 97.4 °F (36.3 °C) (Temporal)   22 98.5 °F (36.9 °C)     BP Readings from Last 3 Encounters:   23 (!) 142/76   22 (!) 167/70   22 (!) 122/97     Pulse Readings from Last 3 Encounters:   23 88   22 77   22 100         Learning Assessment:  :     Learning Assessment 2014   PRIMARY LEARNER Patient   HIGHEST LEVEL OF EDUCATION - PRIMARY LEARNER  DID NOT GRADUATE HIGH SCHOOL   BARRIERS PRIMARY LEARNER NONE   CO-LEARNER CAREGIVER No   PRIMARY LANGUAGE ENGLISH   LEARNER PREFERENCE PRIMARY OTHER (COMMENT)   ANSWERED BY patient   RELATIONSHIP SELF       Depression Screening:  :     3 most recent PHQ Screens 2023   PHQ Not Done -   Little interest or pleasure in doing things Nearly every day   Feeling down, depressed, irritable, or hopeless Nearly every day   Total Score PHQ 2 6   Trouble falling or staying asleep, or sleeping too much More than half the days   Feeling tired or having little energy Nearly every day   Poor appetite, weight loss, or overeating Not at all   Feeling bad about yourself - or that you are a failure or have let yourself or your family down Nearly every day   Trouble concentrating on things such as school, work, reading, or watching TV Nearly every day   Moving or speaking so slowly that other people could have noticed; or the opposite being so fidgety that others notice Several days   Thoughts of being better off dead, or hurting yourself in some way More than half the days   PHQ 9 Score 20   How difficult have these problems made it for you to do your work, take care of your home and get along with others Very difficult       Fall Risk Assessment:  :     Fall Risk Assessment, last 12 mths 1/18/2023   Able to walk? Yes   Fall in past 12 months? 0   Do you feel unsteady? 0   Are you worried about falling 0       Abuse Screening:  :     Abuse Screening Questionnaire 12/27/2022 11/18/2022 5/19/2022 3/18/2021 1/8/2020 10/1/2018 10/11/2017   Do you ever feel afraid of your partner? N N N N N N N   Are you in a relationship with someone who physically or mentally threatens you? N N N N N N N   Is it safe for you to go home? Y Y Y Y Y Y Y       Coordination of Care Questionnaire:  :     1) Have you been to an emergency room, urgent care clinic since your last visit? no   Hospitalized since your last visit? no             2) Have you seen or consulted any other health care providers outside of [address] [place] [address] since your last visit? no  (Include any pap smears or colon screenings in this section.)    3) Do you have an Advance Directive on file? No   Are you interested in receiving information about Advance Directives? no    Patient is accompanied by self I have received verbal consent from Vikram Harris to discuss any/all medical information while they are present in the room. 4.  For patients aged 39-70: Has the patient had a colonoscopy / FIT/ Cologuard? Yes - no Care Gap present      If the patient is female:    5. For patients aged 41-77: Has the patient had a mammogram within the past 2 years? No      6. For patients aged 21-65: Has the patient had a pap smear? NA - based on age or sex

## 2023-03-15 NOTE — PROGRESS NOTES
Micki Garay (: 1955) is a 78 y.o. female, new patient, here for evaluation of the following chief complaint(s):  Follow-up (Patient is here for follow up on medications ) and Medication Refill (Patient needs medication refills and wants to discuss doxepin, she was taken off medication and she is itching really bad to where it is becoming unbearable )     After medication check, we stopped doxepin. ASSESSMENT/PLAN:  Below is the assessment and plan developed based on review of pertinent history, physical exam, labs, studies, and medications. 1. Type 2 diabetes mellitus without complication, without long-term current use of insulin (HCC)  -     telmisartan (MICARDIS) 20 mg tablet; Take 1 Tablet by mouth daily. Indications: high blood pressure, Normal, Disp-30 Tablet, R-3  2. Adult growth hormone deficiency (HCC)  -     telmisartan (MICARDIS) 20 mg tablet; Take 1 Tablet by mouth daily. Indications: high blood pressure, Normal, Disp-30 Tablet, R-3  3. MOFFETT (nonalcoholic steatohepatitis)  -     telmisartan (MICARDIS) 20 mg tablet; Take 1 Tablet by mouth daily. Indications: high blood pressure, Normal, Disp-30 Tablet, R-3      No follow-ups on file. SUBJECTIVE/OBJECTIVE:  Micki Garay is a 78 y.o. 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 Patient also would like medication refills. Hasn't taken her blood pressure medications, we discussed the use of telmisartan for blood pressure. Review of Systems   Constitutional:  Negative for chills, diaphoresis, fatigue and unexpected weight change. HENT:  Negative for congestion, ear pain, facial swelling and rhinorrhea. Eyes: Negative. Respiratory: Negative. Cardiovascular:  Negative for chest pain, palpitations and leg swelling. Gastrointestinal:  Negative for abdominal pain, constipation and diarrhea. Endocrine: Negative. Genitourinary: Negative. Negative for difficulty urinating. Musculoskeletal: Negative. Skin: Negative. Allergic/Immunologic: Negative. Neurological: Negative. Negative for dizziness, speech difficulty, weakness and light-headedness. Hematological: Negative. Psychiatric/Behavioral: Negative. Physical Exam  Constitutional:       Appearance: She is obese. HENT:      Head: Normocephalic and atraumatic. Right Ear: Tympanic membrane, ear canal and external ear normal.      Left Ear: Tympanic membrane, ear canal and external ear normal.      Nose: Nose normal.      Mouth/Throat:      Mouth: Mucous membranes are moist.      Pharynx: Oropharynx is clear. Eyes:      Extraocular Movements: Extraocular movements intact. Conjunctiva/sclera: Conjunctivae normal.      Pupils: Pupils are equal, round, and reactive to light. Cardiovascular:      Rate and Rhythm: Normal rate and regular rhythm. Pulses: Normal pulses. Heart sounds: Normal heart sounds. Pulmonary:      Effort: Pulmonary effort is normal.      Breath sounds: Normal breath sounds. Abdominal:      General: Abdomen is flat. Bowel sounds are normal.      Palpations: Abdomen is soft. Musculoskeletal:         General: Normal range of motion. Cervical back: Normal range of motion and neck supple. Skin:     General: Skin is warm and dry. Capillary Refill: Capillary refill takes less than 2 seconds. Neurological:      General: No focal deficit present. Mental Status: She is alert and oriented to person, place, and time. Mental status is at baseline. Psychiatric:         Mood and Affect: Mood normal.         Behavior: Behavior normal.         Thought Content: Thought content normal.         Judgment: Judgment normal.         An electronic signature was used to authenticate this note.   -- Manuel Gannon MD

## 2023-05-10 RX ORDER — DOXEPIN HYDROCHLORIDE 10 MG/1
CAPSULE ORAL
Qty: 120 CAPSULE | Refills: 0 | Status: SHIPPED | OUTPATIENT
Start: 2023-05-10

## 2023-07-03 RX ORDER — ERGOCALCIFEROL 1.25 MG/1
CAPSULE ORAL
Qty: 12 CAPSULE | OUTPATIENT
Start: 2023-07-03

## 2023-08-17 ENCOUNTER — OFFICE VISIT (OUTPATIENT)
Age: 68
End: 2023-08-17
Payer: MEDICARE

## 2023-08-17 DIAGNOSIS — E03.9 ACQUIRED HYPOTHYROIDISM: Primary | ICD-10-CM

## 2023-08-17 PROCEDURE — 99214 OFFICE O/P EST MOD 30 MIN: CPT | Performed by: INTERNAL MEDICINE

## 2023-08-17 PROCEDURE — 3017F COLORECTAL CA SCREEN DOC REV: CPT | Performed by: INTERNAL MEDICINE

## 2023-08-17 PROCEDURE — G8417 CALC BMI ABV UP PARAM F/U: HCPCS | Performed by: INTERNAL MEDICINE

## 2023-08-17 PROCEDURE — 1090F PRES/ABSN URINE INCON ASSESS: CPT | Performed by: INTERNAL MEDICINE

## 2023-08-17 PROCEDURE — 1123F ACP DISCUSS/DSCN MKR DOCD: CPT | Performed by: INTERNAL MEDICINE

## 2023-08-17 PROCEDURE — G8428 CUR MEDS NOT DOCUMENT: HCPCS | Performed by: INTERNAL MEDICINE

## 2023-08-17 PROCEDURE — 4004F PT TOBACCO SCREEN RCVD TLK: CPT | Performed by: INTERNAL MEDICINE

## 2023-08-17 PROCEDURE — G8400 PT W/DXA NO RESULTS DOC: HCPCS | Performed by: INTERNAL MEDICINE

## 2023-08-17 NOTE — PROGRESS NOTES
Chief Complaint   Patient presents with    Thyroid Problem       * Since Last Visit: 6/20/2022     Hectic -  with lots of health issues  Also 13yo grandson with issues     On 200mcg unithroid  On brand, takes correctly, no missed doses   (Was to lower dose last year but not done)    Also has liver issue so hard to tell what is what - missed last appt       General:  From initial visit 3/18/22  Recent TSH is high   Is dealing with a liver condition - can be forgetful and confused - but does not think missed enough to cause the high TSH  Maybe only missed one once in awhile   Wt based is 206, is on 175mcg  Takes in AM with water, waits to eat 30 min or more  No Fe, Ca or MTV   No new meds  Did have choly 7/21  -  Was on nordotropin - got $3000 from them but only helped the 1st Rx of the year   Co-pay was $300 for 30 days after that   Has been off [address] since 7/21  Went on medicare 8/21     Thyroid Symptoms  **has decreased energy**, denies change in weight, denies change in appetite, denies sleep issues, denies hair changes, no skin changes, denies sweats, denies hot/cold intolerance, denies tremor, denies palpitations, denies change in bowels, is menopausal, denies mood changes    Neck Symptoms  denies dysphagia, denies anterior neck pain, denies neck swelling, notes no nodules      Labs/Studies    Lab Results   Component Value Date/Time    TSH 0.015 06/20/2022 04:47 PM    T4FREE 1.88 06/05/2020 08:47 AM       Lab Results   Component Value Date/Time    TSH 0.015 06/20/2022 04:47 PM    TSH 45.70 03/10/2022 09:58 AM    TSH 0.052 06/05/2020 08:47 AM    TSH 32.340 01/08/2020 12:00 AM          Past Medical History:   Diagnosis Date    Depression     Diabetes ([address])     Gastrointestinal disorder     gerd    GERD (gastroesophageal reflux disease)     High cholesterol     Liver disease     elevated liver enzymes/fatty liver    Obese     uses CPAP    Other ill-defined conditions(559.89)     fibromyalgia    Psychiatric

## 2023-08-18 LAB — TSH SERPL DL<=0.005 MIU/L-ACNC: 0.05 UIU/ML (ref 0.45–4.5)

## 2023-08-22 RX ORDER — CYCLOBENZAPRINE HCL 5 MG
TABLET ORAL
Qty: 120 TABLET | Refills: 0 | Status: SHIPPED | OUTPATIENT
Start: 2023-08-22

## 2023-08-24 RX ORDER — ESCITALOPRAM OXALATE 10 MG/1
TABLET ORAL
Qty: 90 TABLET | Refills: 0 | Status: SHIPPED | OUTPATIENT
Start: 2023-08-24

## 2023-08-27 RX ORDER — LEVOTHYROXINE SODIUM 200 UG/1
TABLET ORAL
Qty: 90 TABLET | Refills: 1 | Status: SHIPPED | OUTPATIENT
Start: 2023-08-27

## 2023-08-29 ENCOUNTER — OFFICE VISIT (OUTPATIENT)
Age: 68
End: 2023-08-29

## 2023-08-29 VITALS
HEART RATE: 104 BPM | SYSTOLIC BLOOD PRESSURE: 118 MMHG | RESPIRATION RATE: 16 BRPM | WEIGHT: 285.4 LBS | TEMPERATURE: 101.5 F | DIASTOLIC BLOOD PRESSURE: 74 MMHG | OXYGEN SATURATION: 96 % | BODY MASS INDEX: 47.49 KG/M2

## 2023-08-29 DIAGNOSIS — R68.89 FLU-LIKE SYMPTOMS: Primary | ICD-10-CM

## 2023-08-29 DIAGNOSIS — U07.1 COVID-19: ICD-10-CM

## 2023-08-29 LAB
Lab: ABNORMAL
PERFORMING INSTRUMENT: ABNORMAL
QC PASS/FAIL: ABNORMAL
SARS-COV-2, POC: DETECTED

## 2023-08-29 RX ORDER — DEXTROMETHORPHAN HYDROBROMIDE AND PROMETHAZINE HYDROCHLORIDE 15; 6.25 MG/5ML; MG/5ML
5 SYRUP ORAL 4 TIMES DAILY PRN
Qty: 120 ML | Refills: 0 | Status: SHIPPED | OUTPATIENT
Start: 2023-08-29 | End: 2023-09-05

## 2023-08-29 RX ORDER — ALBUTEROL SULFATE 90 UG/1
2 AEROSOL, METERED RESPIRATORY (INHALATION) EVERY 4 HOURS PRN
Qty: 18 G | Refills: 1 | Status: SHIPPED | OUTPATIENT
Start: 2023-08-29

## 2023-08-29 ASSESSMENT — ENCOUNTER SYMPTOMS
COUGH: 1
RHINORRHEA: 1

## 2023-08-29 NOTE — PROGRESS NOTES
deficit present. Mental Status: She is alert and oriented to person, place, and time. Psychiatric:         Mood and Affect: Mood normal.         Behavior: Behavior normal.                An electronic signature was used to authenticate this note.   -- Lashell Smith PA-C

## 2023-09-01 ENCOUNTER — TELEPHONE (OUTPATIENT)
Age: 68
End: 2023-09-01

## 2023-09-01 NOTE — TELEPHONE ENCOUNTER
----- Message from Mikie Magallanes sent at 8/29/2023  4:29 PM EDT -----  Regarding: Patient Care Follow Up  Please make a follow up call to patient to ensure they have picked up and started taking the prescribed medications. If condition does not improve, please advise patient to follow up with PCP.

## 2023-09-01 NOTE — TELEPHONE ENCOUNTER
Spoke with pt, verified name/. Pt states she did  medication and has started regimen is feeling much better. Had very positive comments to add about the staff and facility and wanted to convey her pleasant experience. No questions/concerns at this time.    Omid Watson

## 2023-09-25 RX ORDER — DOXEPIN HYDROCHLORIDE 10 MG/1
CAPSULE ORAL
Qty: 120 CAPSULE | Refills: 3 | Status: SHIPPED | OUTPATIENT
Start: 2023-09-25

## 2023-09-27 NOTE — LETTER
6/20/2022    Patient: Leonel Rosa   YOB: 1955   Date of Visit: 6/20/2022     Lisbet Kelly NP  [address] [place] [address]  [address]  [address] [facility]  Via In Basket    Dear Lisbet Kelly NP,      Thank you for referring Ms. Tj Loera to [facility] for evaluation. My notes for this consultation are attached. If you have questions, please do not hesitate to call me. I look forward to following your patient along with you.       Sincerely,    Dorcas Garcia MD
2050

## 2023-10-05 DIAGNOSIS — E03.9 ACQUIRED HYPOTHYROIDISM: ICD-10-CM

## 2023-11-30 RX ORDER — ESCITALOPRAM OXALATE 10 MG/1
10 TABLET ORAL DAILY
Qty: 90 TABLET | Refills: 0 | Status: SHIPPED | OUTPATIENT
Start: 2023-11-30

## 2024-01-04 DIAGNOSIS — E03.9 HYPOTHYROIDISM, UNSPECIFIED: ICD-10-CM

## 2024-01-05 RX ORDER — LEVOTHYROXINE SODIUM 200 UG/1
TABLET ORAL
Qty: 90 TABLET | Refills: 3 | OUTPATIENT
Start: 2024-01-05

## 2024-02-05 DIAGNOSIS — E03.9 ACQUIRED HYPOTHYROIDISM: ICD-10-CM

## 2024-02-05 RX ORDER — LEVOTHYROXINE SODIUM 175 UG/1
TABLET ORAL
Qty: 90 TABLET | Refills: 1 | Status: SHIPPED | OUTPATIENT
Start: 2024-02-05

## 2024-02-05 NOTE — TELEPHONE ENCOUNTER
----- Message from Iris Richmond sent at 2/5/2024  1:29 PM EST -----  Subject: Refill Request    QUESTIONS  Name of Medication? escitalopram (LEXAPRO) 10 MG tablet  Patient-reported dosage and instructions? 1 tablet daily  How many days do you have left? 0  Preferred Pharmacy? [facility] [id number]  Pharmacy phone number (if available)? [phone]  Additional Information for Provider? Patient has the first available appt   with Dr Neves 2/14/24 and is completely out of meds. Patient would like a   refill now. Please call the patient to advise,   ---------------------------------------------------------------------------  --------------  CALL BACK INFO  What is the best way for the office to contact you? OK to leave message on   voicemail  Preferred Call Back Phone Number? [phone]  ---------------------------------------------------------------------------  --------------  SCRIPT ANSWERS  Relationship to Patient? Self

## 2024-02-14 ENCOUNTER — OFFICE VISIT (OUTPATIENT)
Age: 69
End: 2024-02-14
Payer: MEDICARE

## 2024-02-14 ENCOUNTER — NURSE ONLY (OUTPATIENT)
Age: 69
End: 2024-02-14

## 2024-02-14 VITALS
HEART RATE: 70 BPM | TEMPERATURE: 98 F | RESPIRATION RATE: 17 BRPM | DIASTOLIC BLOOD PRESSURE: 72 MMHG | WEIGHT: 279.8 LBS | SYSTOLIC BLOOD PRESSURE: 126 MMHG | HEIGHT: 65 IN | OXYGEN SATURATION: 97 % | BODY MASS INDEX: 46.62 KG/M2

## 2024-02-14 DIAGNOSIS — E11.9 TYPE 2 DIABETES MELLITUS WITHOUT COMPLICATION, WITHOUT LONG-TERM CURRENT USE OF INSULIN (HCC): ICD-10-CM

## 2024-02-14 DIAGNOSIS — E03.9 ACQUIRED HYPOTHYROIDISM: ICD-10-CM

## 2024-02-14 DIAGNOSIS — E23.0 HYPOPITUITARISM (HCC): ICD-10-CM

## 2024-02-14 DIAGNOSIS — Z00.00 INITIAL MEDICARE ANNUAL WELLNESS VISIT: Primary | ICD-10-CM

## 2024-02-14 DIAGNOSIS — K76.0 FATTY LIVER: ICD-10-CM

## 2024-02-14 DIAGNOSIS — Z23 ENCOUNTER FOR IMMUNIZATION: ICD-10-CM

## 2024-02-14 DIAGNOSIS — F33.1 MAJOR DEPRESSIVE DISORDER, RECURRENT, MODERATE (HCC): ICD-10-CM

## 2024-02-14 DIAGNOSIS — Z12.31 SCREENING MAMMOGRAM FOR BREAST CANCER: ICD-10-CM

## 2024-02-14 PROCEDURE — 1123F ACP DISCUSS/DSCN MKR DOCD: CPT | Performed by: FAMILY MEDICINE

## 2024-02-14 PROCEDURE — 3078F DIAST BP <80 MM HG: CPT | Performed by: FAMILY MEDICINE

## 2024-02-14 PROCEDURE — 3044F HG A1C LEVEL LT 7.0%: CPT | Performed by: FAMILY MEDICINE

## 2024-02-14 PROCEDURE — 3074F SYST BP LT 130 MM HG: CPT | Performed by: FAMILY MEDICINE

## 2024-02-14 PROCEDURE — G0438 PPPS, INITIAL VISIT: HCPCS | Performed by: FAMILY MEDICINE

## 2024-02-14 RX ORDER — LEVOTHYROXINE SODIUM 175 UG/1
TABLET ORAL
Qty: 90 TABLET | Refills: 1 | Status: SHIPPED | OUTPATIENT
Start: 2024-02-14

## 2024-02-14 RX ORDER — LEVOTHYROXINE SODIUM 175 UG/1
TABLET ORAL
Qty: 90 TABLET | Refills: 1 | Status: SHIPPED | OUTPATIENT
Start: 2024-02-14 | End: 2024-02-14

## 2024-02-14 RX ORDER — DOXEPIN HYDROCHLORIDE 10 MG/1
10 CAPSULE ORAL NIGHTLY
Qty: 120 CAPSULE | Refills: 3 | Status: SHIPPED | OUTPATIENT
Start: 2024-02-14 | End: 2024-02-14

## 2024-02-14 RX ORDER — ESCITALOPRAM OXALATE 10 MG/1
10 TABLET ORAL DAILY
Qty: 90 TABLET | Refills: 0 | Status: SHIPPED | OUTPATIENT
Start: 2024-02-14

## 2024-02-14 RX ORDER — ESCITALOPRAM OXALATE 10 MG/1
10 TABLET ORAL DAILY
Qty: 90 TABLET | Refills: 0 | Status: SHIPPED | OUTPATIENT
Start: 2024-02-14 | End: 2024-02-14

## 2024-02-14 RX ORDER — DOXEPIN HYDROCHLORIDE 10 MG/1
10 CAPSULE ORAL NIGHTLY
Qty: 120 CAPSULE | Refills: 3 | Status: SHIPPED | OUTPATIENT
Start: 2024-02-14

## 2024-02-14 NOTE — PATIENT INSTRUCTIONS
lose weight. Your doctor can suggest groups in your area.  Where can you learn more?  Go to https://[email or web address].net/patientEd and enter U357 to learn more about \"Starting a Weight Loss Plan: Care Instructions.\"  Current as of: September 20, 2023               Content Version: 13.9  © 1427-1655 [employer].   Care instructions adapted under license by "[employer]. If you have questions about a medical condition or this instruction, always ask your healthcare professional. [employer] disclaims any warranty or liability for your use of this information.           A Healthy Heart: Care Instructions  Your Care Instructions     Coronary artery disease, also called heart disease, occurs when a substance called plaque builds up in the vessels that supply oxygen-rich blood to your heart muscle. This can narrow the blood vessels and reduce blood flow. A heart attack happens when blood flow is completely blocked. A high-fat diet, smoking, and other factors increase the risk of heart disease.  Your doctor has found that you have a chance of having heart disease. You can do lots of things to keep your heart healthy. It may not be easy, but you can change your diet, exercise more, and quit smoking. These steps really work to lower your chance of heart disease.  Follow-up care is a key part of your treatment and safety. Be sure to make and go to all appointments, and call your doctor if you are having problems. It's also a good idea to know your test results and keep a list of the medicines you take.  How can you care for yourself at home?  Diet    Use less salt when you cook and eat. This helps lower your blood pressure. Taste food before salting. Add only a little salt when you think you need it. With time, your taste buds will adjust to less salt.     Eat fewer snack items, fast foods, canned soups, and other high-salt, high-fat, processed foods.     Read food labels and try to avoid saturated and

## 2024-02-14 NOTE — PROGRESS NOTES
Identified pt with two pt identifiers(name and ).    Chief Complaint   Patient presents with    Follow-up     Patient Is here for a follow up and is needing medication refills, patient is needing labs but did have coffee at 6 am     Medicare AW    Shoulder Pain     Patient is having some pain in her left shoulder/neck         Health Maintenance Due   Topic    COVID-19 Vaccine (1)    Pneumococcal 65+ years Vaccine (1 - PCV)    Shingles vaccine (1 of 2)    DEXA (modify frequency per FRAX score)     Respiratory Syncytial Virus (RSV) Pregnant or age 60 yrs+ (1 - 1-dose 60+ series)    Diabetic foot exam     Diabetic retinal exam     Breast cancer screen     A1C test (Diabetic or Prediabetic)     Diabetic Alb to Cr ratio (uACR) test     Lipids     GFR test (Diabetes, CKD 3-4, OR last GFR 15-59)     Flu vaccine (1)    Annual Wellness Visit (Medicare Advantage)     Depression Monitoring        Wt Readings from Last 3 Encounters:   23 129.5 kg (285 lb 6.4 oz)   23 125 kg (275 lb 9.6 oz)   23 127.5 kg (281 lb)     Temp Readings from Last 3 Encounters:   23 (!) 101.5 °F (38.6 °C) (Oral)     BP Readings from Last 3 Encounters:   23 118/74   23 139/76   23 (!) 142/76     Pulse Readings from Last 3 Encounters:   23 (!) 104   23 80   23 88           Depression Screening:  :         2023    11:21 AM 2022     3:00 PM 2022    10:00 AM 10/28/2022    10:00 AM 2022     2:24 PM 2022    11:00 AM 3/10/2022     8:52 AM   PHQ-9 Questionaire   Little interest or pleasure in doing things 3 2 0 2 1 3 0   Feeling down, depressed, or hopeless 3 2 0 2 1 2 0   Trouble falling or staying asleep, or sleeping too much 2 0  0  1    Feeling tired or having little energy 3 3  0  3    Poor appetite or overeating 0 0  0  0    Feeling bad about yourself - or that you are a failure or have let yourself or your family down 3 2  0  3    Trouble concentrating on things, 
Scarlet GHOTRA MD   ergocalciferol (ERGOCALCIFEROL) 1.25 MG (37604 UT) capsule Take by mouth every 7 days Yes Automatic Reconciliation, Ar       CareTeam (Including outside providers/suppliers regularly involved in providing care):   Patient Care Team:  Scarlet Neves MD as PCP - General  Scarlet Neves MD as PCP - Empaneled Provider  Hallie Haney MD as Physician     Reviewed and updated this visit:

## 2024-02-15 LAB
CHOLEST SERPL-MCNC: 194 MG/DL
EST. AVERAGE GLUCOSE BLD GHB EST-MCNC: 123 MG/DL
HBA1C MFR BLD: 5.9 % (ref 4–5.6)
HDLC SERPL-MCNC: 38 MG/DL
HDLC SERPL: 5.1 (ref 0–5)
LDLC SERPL CALC-MCNC: 123.2 MG/DL (ref 0–100)
T4 FREE SERPL-MCNC: 1.7 NG/DL (ref 0.8–1.5)
TRIGL SERPL-MCNC: 164 MG/DL
TSH SERPL DL<=0.05 MIU/L-ACNC: 0.98 UIU/ML (ref 0.36–3.74)
VLDLC SERPL CALC-MCNC: 32.8 MG/DL

## 2024-02-17 NOTE — RESULT ENCOUNTER NOTE
Patients labs are abnormal and need to be discussed during her follow up appointment which is scheduled in 3/24. Her thyroid medication needs to be adjusted and we have to review her thyroid history, patient also has a history of fatty liver.

## 2024-02-26 RX ORDER — CYCLOBENZAPRINE HCL 5 MG
TABLET ORAL
Qty: 120 TABLET | Refills: 0 | Status: SHIPPED | OUTPATIENT
Start: 2024-02-26

## 2024-03-14 ENCOUNTER — OFFICE VISIT (OUTPATIENT)
Age: 69
End: 2024-03-14
Payer: MEDICARE

## 2024-03-14 VITALS
OXYGEN SATURATION: 97 % | HEART RATE: 76 BPM | SYSTOLIC BLOOD PRESSURE: 130 MMHG | BODY MASS INDEX: 46.65 KG/M2 | DIASTOLIC BLOOD PRESSURE: 65 MMHG | WEIGHT: 280 LBS | RESPIRATION RATE: 17 BRPM | TEMPERATURE: 98 F | HEIGHT: 65 IN

## 2024-03-14 DIAGNOSIS — L50.9 URTICARIAL DERMATITIS: Primary | ICD-10-CM

## 2024-03-14 DIAGNOSIS — Z23 ENCOUNTER FOR IMMUNIZATION: ICD-10-CM

## 2024-03-14 DIAGNOSIS — K58.2 IRRITABLE BOWEL SYNDROME WITH BOTH CONSTIPATION AND DIARRHEA: ICD-10-CM

## 2024-03-14 DIAGNOSIS — Z71.85 IMMUNIZATION COUNSELING: ICD-10-CM

## 2024-03-14 PROCEDURE — 3078F DIAST BP <80 MM HG: CPT | Performed by: FAMILY MEDICINE

## 2024-03-14 PROCEDURE — 90732 PPSV23 VACC 2 YRS+ SUBQ/IM: CPT | Performed by: FAMILY MEDICINE

## 2024-03-14 PROCEDURE — PBSHW PNEUMOCOCCAL, PPSV23, PNEUMOVAX 23, (AGE 2 YRS+), SC/IM: Performed by: FAMILY MEDICINE

## 2024-03-14 PROCEDURE — 99214 OFFICE O/P EST MOD 30 MIN: CPT | Performed by: FAMILY MEDICINE

## 2024-03-14 PROCEDURE — 3075F SYST BP GE 130 - 139MM HG: CPT | Performed by: FAMILY MEDICINE

## 2024-03-14 PROCEDURE — 1123F ACP DISCUSS/DSCN MKR DOCD: CPT | Performed by: FAMILY MEDICINE

## 2024-03-14 RX ORDER — EZETIMIBE 10 MG/1
10 TABLET ORAL DAILY
Qty: 30 TABLET | Refills: 5 | Status: SHIPPED | OUTPATIENT
Start: 2024-03-14

## 2024-03-14 NOTE — PROGRESS NOTES
Identified pt with two pt identifiers(name and ).    Chief Complaint   Patient presents with    Follow-up     Patient is here for follow up and is doing well         Health Maintenance Due   Topic    COVID-19 Vaccine (1)    Pneumococcal 65+ years Vaccine (1 - PCV)    Shingles vaccine (1 of 2)    DEXA (modify frequency per FRAX score)     Respiratory Syncytial Virus (RSV) Pregnant or age 60 yrs+ (1 - 1-dose 60+ series)    Diabetic foot exam     Diabetic retinal exam     Breast cancer screen     Diabetic Alb to Cr ratio (uACR) test     GFR test (Diabetes, CKD 3-4, OR last GFR 15-59)     Flu vaccine (1)       Wt Readings from Last 3 Encounters:   24 126.9 kg (279 lb 12.8 oz)   23 129.5 kg (285 lb 6.4 oz)   23 125 kg (275 lb 9.6 oz)     Temp Readings from Last 3 Encounters:   24 98 °F (36.7 °C) (Temporal)   23 (!) 101.5 °F (38.6 °C) (Oral)     BP Readings from Last 3 Encounters:   24 126/72   23 118/74   23 139/76     Pulse Readings from Last 3 Encounters:   24 70   23 (!) 104   23 80           Depression Screening:  :         2024    11:17 AM 2024    11:14 AM 2023    11:21 AM 2022     3:00 PM 2022    10:00 AM 10/28/2022    10:00 AM 2022     2:24 PM   PHQ-9 Questionaire   Little interest or pleasure in doing things 3 3 3 2 0 2 1   Feeling down, depressed, or hopeless 2 1 3 2 0 2 1   Trouble falling or staying asleep, or sleeping too much 0 0 2 0  0    Feeling tired or having little energy 3 3 3 3  0    Poor appetite or overeating 2 2 0 0  0    Feeling bad about yourself - or that you are a failure or have let yourself or your family down 2 2 3 2  0    Trouble concentrating on things, such as reading the newspaper or watching television 0 0 3 1  0    Moving or speaking so slowly that other people could have noticed. Or the opposite - being so fidgety or restless that you have been moving around a lot more than usual 0 0 1 
and regular rhythm.      Pulses: Normal pulses.      Heart sounds: Normal heart sounds.   Pulmonary:      Effort: Pulmonary effort is normal.      Breath sounds: Normal breath sounds.   Abdominal:      General: Abdomen is flat. Bowel sounds are normal.   Musculoskeletal:         General: Normal range of motion.      Cervical back: Normal range of motion and neck supple.   Skin:     General: Skin is warm.      Capillary Refill: Capillary refill takes less than 2 seconds.   Neurological:      General: No focal deficit present.      Mental Status: She is alert and oriented to person, place, and time. Mental status is at baseline.   Psychiatric:         Mood and Affect: Mood normal.         Behavior: Behavior normal.         Thought Content: Thought content normal.         Judgment: Judgment normal.                  An electronic signature was used to authenticate this note.    --Scarlet Neves MD

## 2024-04-18 ENCOUNTER — TELEPHONE (OUTPATIENT)
Age: 69
End: 2024-04-18

## 2024-04-18 NOTE — TELEPHONE ENCOUNTER
Patient has lab appointment scheduled for 4/25/2024  1:00 PM.  Lab schedule has changed,  will be at lunch from 12:30 to 1:30.   Patient needs to reschedule lab appointment.

## 2024-04-25 ENCOUNTER — NURSE ONLY (OUTPATIENT)
Age: 69
End: 2024-04-25

## 2024-04-25 DIAGNOSIS — L50.9 URTICARIAL DERMATITIS: ICD-10-CM

## 2024-08-04 RX ORDER — CYCLOBENZAPRINE HCL 5 MG
TABLET ORAL
Qty: 90 TABLET | Refills: 0 | Status: SHIPPED | OUTPATIENT
Start: 2024-08-04

## 2025-01-29 ENCOUNTER — COMMUNITY OUTREACH (OUTPATIENT)
Age: 70
End: 2025-01-29

## 2025-02-04 ENCOUNTER — LAB (OUTPATIENT)
Age: 70
End: 2025-02-04
Payer: MEDICARE

## 2025-02-04 ENCOUNTER — OFFICE VISIT (OUTPATIENT)
Age: 70
End: 2025-02-04
Payer: MEDICARE

## 2025-02-04 VITALS
RESPIRATION RATE: 17 BRPM | BODY MASS INDEX: 42.65 KG/M2 | SYSTOLIC BLOOD PRESSURE: 139 MMHG | OXYGEN SATURATION: 97 % | WEIGHT: 265.4 LBS | HEIGHT: 66 IN | DIASTOLIC BLOOD PRESSURE: 72 MMHG | TEMPERATURE: 98.1 F | HEART RATE: 81 BPM

## 2025-02-04 DIAGNOSIS — Z91.81 AT HIGH RISK FOR FALLS: ICD-10-CM

## 2025-02-04 DIAGNOSIS — F33.1 MAJOR DEPRESSIVE DISORDER, RECURRENT, MODERATE (HCC): ICD-10-CM

## 2025-02-04 DIAGNOSIS — E03.9 ACQUIRED HYPOTHYROIDISM: ICD-10-CM

## 2025-02-04 DIAGNOSIS — Z71.85 IMMUNIZATION COUNSELING: ICD-10-CM

## 2025-02-04 DIAGNOSIS — R79.9 ABNORMAL FINDING OF BLOOD CHEMISTRY, UNSPECIFIED: ICD-10-CM

## 2025-02-04 DIAGNOSIS — Z13.31 POSITIVE DEPRESSION SCREENING: ICD-10-CM

## 2025-02-04 DIAGNOSIS — Z00.00 MEDICARE ANNUAL WELLNESS VISIT, SUBSEQUENT: ICD-10-CM

## 2025-02-04 DIAGNOSIS — Z00.00 MEDICARE ANNUAL WELLNESS VISIT, SUBSEQUENT: Primary | ICD-10-CM

## 2025-02-04 DIAGNOSIS — Z23 ENCOUNTER FOR IMMUNIZATION: ICD-10-CM

## 2025-02-04 DIAGNOSIS — Z78.0 ASYMPTOMATIC MENOPAUSAL STATE: ICD-10-CM

## 2025-02-04 DIAGNOSIS — Z12.31 ENCOUNTER FOR SCREENING MAMMOGRAM FOR BREAST CANCER: ICD-10-CM

## 2025-02-04 DIAGNOSIS — Z13.220 SCREENING FOR LIPID DISORDERS: ICD-10-CM

## 2025-02-04 PROCEDURE — 99213 OFFICE O/P EST LOW 20 MIN: CPT | Performed by: FAMILY MEDICINE

## 2025-02-04 RX ORDER — LEVOTHYROXINE SODIUM 175 UG/1
TABLET ORAL
Qty: 90 TABLET | Refills: 1 | Status: SHIPPED | OUTPATIENT
Start: 2025-02-04

## 2025-02-04 RX ORDER — DOXEPIN HYDROCHLORIDE 10 MG/1
10 CAPSULE ORAL NIGHTLY
Qty: 120 CAPSULE | Refills: 3 | Status: SHIPPED | OUTPATIENT
Start: 2025-02-04

## 2025-02-04 RX ORDER — CYCLOBENZAPRINE HCL 5 MG
TABLET ORAL
Qty: 90 TABLET | Refills: 0 | Status: SHIPPED | OUTPATIENT
Start: 2025-02-04

## 2025-02-04 RX ORDER — EZETIMIBE 10 MG/1
10 TABLET ORAL DAILY
Qty: 30 TABLET | Refills: 5 | Status: SHIPPED | OUTPATIENT
Start: 2025-02-04

## 2025-02-04 RX ORDER — ESCITALOPRAM OXALATE 10 MG/1
10 TABLET ORAL DAILY
Qty: 90 TABLET | Refills: 0 | Status: SHIPPED | OUTPATIENT
Start: 2025-02-04

## 2025-02-04 SDOH — ECONOMIC STABILITY: FOOD INSECURITY: WITHIN THE PAST 12 MONTHS, THE FOOD YOU BOUGHT JUST DIDN'T LAST AND YOU DIDN'T HAVE MONEY TO GET MORE.: PATIENT DECLINED

## 2025-02-04 SDOH — ECONOMIC STABILITY: INCOME INSECURITY: IN THE LAST 12 MONTHS, WAS THERE A TIME WHEN YOU WERE NOT ABLE TO PAY THE MORTGAGE OR RENT ON TIME?: YES

## 2025-02-04 SDOH — HEALTH STABILITY: PHYSICAL HEALTH: ON AVERAGE, HOW MANY DAYS PER WEEK DO YOU ENGAGE IN MODERATE TO STRENUOUS EXERCISE (LIKE A BRISK WALK)?: 0 DAYS

## 2025-02-04 SDOH — ECONOMIC STABILITY: TRANSPORTATION INSECURITY
IN THE PAST 12 MONTHS, HAS THE LACK OF TRANSPORTATION KEPT YOU FROM MEDICAL APPOINTMENTS OR FROM GETTING MEDICATIONS?: NO

## 2025-02-04 SDOH — HEALTH STABILITY: PHYSICAL HEALTH: ON AVERAGE, HOW MANY MINUTES DO YOU ENGAGE IN EXERCISE AT THIS LEVEL?: 0 MIN

## 2025-02-04 SDOH — ECONOMIC STABILITY: TRANSPORTATION INSECURITY
IN THE PAST 12 MONTHS, HAS LACK OF TRANSPORTATION KEPT YOU FROM MEETINGS, WORK, OR FROM GETTING THINGS NEEDED FOR DAILY LIVING?: NO

## 2025-02-04 SDOH — ECONOMIC STABILITY: FOOD INSECURITY: WITHIN THE PAST 12 MONTHS, YOU WORRIED THAT YOUR FOOD WOULD RUN OUT BEFORE YOU GOT MONEY TO BUY MORE.: PATIENT DECLINED

## 2025-02-04 ASSESSMENT — LIFESTYLE VARIABLES
HOW OFTEN DO YOU HAVE A DRINK CONTAINING ALCOHOL: MONTHLY OR LESS
HOW OFTEN DO YOU HAVE SIX OR MORE DRINKS ON ONE OCCASION: 1
HOW MANY STANDARD DRINKS CONTAINING ALCOHOL DO YOU HAVE ON A TYPICAL DAY: 1 OR 2
HOW OFTEN DO YOU HAVE A DRINK CONTAINING ALCOHOL: 2
HOW MANY STANDARD DRINKS CONTAINING ALCOHOL DO YOU HAVE ON A TYPICAL DAY: 1

## 2025-02-04 ASSESSMENT — PATIENT HEALTH QUESTIONNAIRE - PHQ9
2. FEELING DOWN, DEPRESSED OR HOPELESS: SEVERAL DAYS
4. FEELING TIRED OR HAVING LITTLE ENERGY: NEARLY EVERY DAY
3. TROUBLE FALLING OR STAYING ASLEEP: NOT AT ALL
SUM OF ALL RESPONSES TO PHQ QUESTIONS 1-9: 10
1. LITTLE INTEREST OR PLEASURE IN DOING THINGS: SEVERAL DAYS
9. THOUGHTS THAT YOU WOULD BE BETTER OFF DEAD, OR OF HURTING YOURSELF: SEVERAL DAYS
SUM OF ALL RESPONSES TO PHQ9 QUESTIONS 1 & 2: 2
SUM OF ALL RESPONSES TO PHQ QUESTIONS 1-9: 10
SUM OF ALL RESPONSES TO PHQ QUESTIONS 1-9: 9
10. IF YOU CHECKED OFF ANY PROBLEMS, HOW DIFFICULT HAVE THESE PROBLEMS MADE IT FOR YOU TO DO YOUR WORK, TAKE CARE OF THINGS AT HOME, OR GET ALONG WITH OTHER PEOPLE: NOT DIFFICULT AT ALL
SUM OF ALL RESPONSES TO PHQ QUESTIONS 1-9: 10
6. FEELING BAD ABOUT YOURSELF - OR THAT YOU ARE A FAILURE OR HAVE LET YOURSELF OR YOUR FAMILY DOWN: MORE THAN HALF THE DAYS
5. POOR APPETITE OR OVEREATING: MORE THAN HALF THE DAYS
8. MOVING OR SPEAKING SO SLOWLY THAT OTHER PEOPLE COULD HAVE NOTICED. OR THE OPPOSITE, BEING SO FIGETY OR RESTLESS THAT YOU HAVE BEEN MOVING AROUND A LOT MORE THAN USUAL: NOT AT ALL
7. TROUBLE CONCENTRATING ON THINGS, SUCH AS READING THE NEWSPAPER OR WATCHING TELEVISION: NOT AT ALL

## 2025-02-04 ASSESSMENT — COLUMBIA-SUICIDE SEVERITY RATING SCALE - C-SSRS
1. WITHIN THE PAST MONTH, HAVE YOU WISHED YOU WERE DEAD OR WISHED YOU COULD GO TO SLEEP AND NOT WAKE UP?: NO
6. HAVE YOU EVER DONE ANYTHING, STARTED TO DO ANYTHING, OR PREPARED TO DO ANYTHING TO END YOUR LIFE?: NO
2. HAVE YOU ACTUALLY HAD ANY THOUGHTS OF KILLING YOURSELF?: NO

## 2025-02-04 NOTE — PROGRESS NOTES
Identified pt with two pt identifiers(name and )    Chief Complaint   Patient presents with    Medicare AWV    Medication Refill        Health Maintenance Due   Topic    Shingles vaccine (1 of 2)    DEXA (modify frequency per FRAX score)     Respiratory Syncytial Virus (RSV) Pregnant or age 60 yrs+ (1 - Risk 60-74 years 1-dose series)    Diabetic foot exam     Diabetic retinal exam     Breast cancer screen     Diabetic Alb to Cr ratio (uACR) test     GFR test (Diabetes, CKD 3-4, OR last GFR 15-59)     Flu vaccine (1)    COVID-19 Vaccine ( season)    Annual Wellness Visit (Medicare Advantage)     A1C test (Diabetic or Prediabetic)     Lipids        Wt Readings from Last 3 Encounters:   24 127 kg (280 lb)   24 126.9 kg (279 lb 12.8 oz)   23 129.5 kg (285 lb 6.4 oz)     Temp Readings from Last 3 Encounters:   24 98 °F (36.7 °C) (Temporal)   24 98 °F (36.7 °C) (Temporal)   23 (!) 101.5 °F (38.6 °C) (Oral)     BP Readings from Last 3 Encounters:   24 130/65   24 126/72   23 118/74     Pulse Readings from Last 3 Encounters:   24 76   24 70   23 (!) 104           Depression Screening:  :         2025     8:58 AM 2024    11:17 AM 2024    11:14 AM 2023    11:21 AM 2022     3:00 PM 2022    10:00 AM 10/28/2022    10:00 AM   PHQ-9 Questionaire   Little interest or pleasure in doing things 1 3 3 3 2 0 2   Feeling down, depressed, or hopeless 1 2 1 3 2 0 2   Trouble falling or staying asleep, or sleeping too much 0 0 0 2 0  0   Feeling tired or having little energy 3 3 3 3 3  0   Poor appetite or overeating 2 2 2 0 0  0   Feeling bad about yourself - or that you are a failure or have let yourself or your family down 2 2 2 3 2  0   Trouble concentrating on things, such as reading the newspaper or watching television 0 0 0 3 1  0   Moving or speaking so slowly that other people could have noticed. Or the opposite -

## 2025-02-04 NOTE — PROGRESS NOTES
Medicare Annual Wellness Visit    Catarina Kay is here for Medicare AWV and Medication Refill    Assessment & Plan   Medicare annual wellness visit, subsequent  Acquired hypothyroidism  -     ESTABLISHED, LOW MDM, 20-29 MIN [75447]  At high risk for falls  Asymptomatic menopausal state  -     DEXA Bone Density Axial Skeleton; Future  -     ESTABLISHED, LOW MDM, 20-29 MIN [71432]  Encounter for screening mammogram for breast cancer  -     RENAE ALEE DIGITAL SCREEN BILATERAL; Future  Positive depression screening  Major depressive disorder, recurrent, moderate (HCC)  The following orders have not been finalized:  -     doxepin (SINEQUAN) 10 MG capsule       Return in 1 year (on 2/4/2026) for Medicare AWV.     Subjective   The following acute and/or chronic problems were also addressed today:  Catarina Kay is a 69 y.o. female presents for a follow up.     She has not been able to get all of her medications due to cost and insurance restrictions.    She has bene compliant with her thyroid medications.     Patient's complete Health Risk Assessment and screening values have been reviewed and are found in Flowsheets. The following problems were reviewed today and where indicated follow up appointments were made and/or referrals ordered.    Positive Risk Factor Screenings with Interventions:    Fall Risk:  Do you feel unsteady or are you worried about falling? : no  2 or more falls in past year?: no  Fall with injury in past year?: (!) yes     Interventions:    Reviewed medications, home hazards, visual acuity, and co-morbidities that can increase risk for falls     Depression:  PHQ-2 Score: 2  PHQ-9 Total Score: 10  Total Score Interpretation: 10-14 = moderate depression  Interventions:  Patient advised to follow-up in this office for further evaluation and treatment           Inactivity:  On average, how many days per week do you engage in moderate to strenuous exercise (like a brisk walk)?: 0 days (!)

## 2025-02-05 LAB
ALBUMIN SERPL-MCNC: 3.3 G/DL (ref 3.5–5)
ALBUMIN/GLOB SERPL: 0.9 (ref 1.1–2.2)
ALP SERPL-CCNC: 104 U/L (ref 45–117)
ALT SERPL-CCNC: 27 U/L (ref 12–78)
ANION GAP SERPL CALC-SCNC: 7 MMOL/L (ref 2–12)
AST SERPL-CCNC: 25 U/L (ref 15–37)
BILIRUB SERPL-MCNC: 0.6 MG/DL (ref 0.2–1)
BUN SERPL-MCNC: 10 MG/DL (ref 6–20)
BUN/CREAT SERPL: 11 (ref 12–20)
CALCIUM SERPL-MCNC: 9.5 MG/DL (ref 8.5–10.1)
CHLORIDE SERPL-SCNC: 106 MMOL/L (ref 97–108)
CO2 SERPL-SCNC: 27 MMOL/L (ref 21–32)
CREAT SERPL-MCNC: 0.88 MG/DL (ref 0.55–1.02)
GLOBULIN SER CALC-MCNC: 3.6 G/DL (ref 2–4)
GLUCOSE SERPL-MCNC: 164 MG/DL (ref 65–100)
POTASSIUM SERPL-SCNC: 3.8 MMOL/L (ref 3.5–5.1)
PROT SERPL-MCNC: 6.9 G/DL (ref 6.4–8.2)
SODIUM SERPL-SCNC: 140 MMOL/L (ref 136–145)
T4 SERPL-MCNC: 19.1 UG/DL (ref 4.8–13.9)
TSH SERPL DL<=0.05 MIU/L-ACNC: 0.15 UIU/ML (ref 0.36–3.74)

## 2025-02-06 NOTE — RESULT ENCOUNTER NOTE
Robert Elmore,     Your thyroid levels show us that your are over controlled and may be taking too much thyroid hormones. I would like to repeat these levels in 8 weeks, until that time, please take your unithroid Monday Thru Saturday, and skip Sunday. This will help bring these levels back to normal.

## 2025-02-08 DIAGNOSIS — E03.9 ACQUIRED HYPOTHYROIDISM: ICD-10-CM

## 2025-02-10 RX ORDER — LEVOTHYROXINE SODIUM 175 UG/1
TABLET ORAL
Qty: 90 TABLET | Refills: 1 | Status: SHIPPED | OUTPATIENT
Start: 2025-02-10

## 2025-05-18 ENCOUNTER — OFFICE VISIT (OUTPATIENT)
Age: 70
End: 2025-05-18

## 2025-05-18 VITALS
BODY MASS INDEX: 42.91 KG/M2 | HEIGHT: 66 IN | OXYGEN SATURATION: 98 % | DIASTOLIC BLOOD PRESSURE: 76 MMHG | TEMPERATURE: 98.3 F | RESPIRATION RATE: 16 BRPM | WEIGHT: 267 LBS | SYSTOLIC BLOOD PRESSURE: 142 MMHG | HEART RATE: 76 BPM

## 2025-05-18 DIAGNOSIS — L30.4 INTERTRIGO: ICD-10-CM

## 2025-05-18 DIAGNOSIS — J02.9 SORE THROAT: ICD-10-CM

## 2025-05-18 DIAGNOSIS — J18.9 PNEUMONIA OF LOWER LOBE DUE TO INFECTIOUS ORGANISM, UNSPECIFIED LATERALITY: Primary | ICD-10-CM

## 2025-05-18 DIAGNOSIS — R06.02 SHORTNESS OF BREATH: ICD-10-CM

## 2025-05-18 DIAGNOSIS — R03.0 ELEVATED BLOOD PRESSURE READING: ICD-10-CM

## 2025-05-18 RX ORDER — FLUCONAZOLE 150 MG/1
150 TABLET ORAL
Qty: 3 TABLET | Refills: 0 | Status: SHIPPED | OUTPATIENT
Start: 2025-05-18 | End: 2025-05-18

## 2025-05-18 RX ORDER — FLUCONAZOLE 150 MG/1
150 TABLET ORAL
Qty: 3 TABLET | Refills: 0 | Status: SHIPPED | OUTPATIENT
Start: 2025-05-18 | End: 2025-05-25

## 2025-05-18 RX ORDER — NYSTATIN 100000 [USP'U]/G
POWDER TOPICAL
Qty: 1 EACH | Refills: 0 | Status: SHIPPED | OUTPATIENT
Start: 2025-05-18

## 2025-05-18 RX ORDER — AZITHROMYCIN 250 MG/1
250 TABLET, FILM COATED ORAL SEE ADMIN INSTRUCTIONS
Qty: 6 TABLET | Refills: 0 | Status: SHIPPED | OUTPATIENT
Start: 2025-05-18 | End: 2025-05-18

## 2025-05-18 RX ORDER — ALBUTEROL SULFATE 90 UG/1
2 INHALANT RESPIRATORY (INHALATION) EVERY 6 HOURS PRN
Qty: 18 G | Refills: 0 | Status: SHIPPED | OUTPATIENT
Start: 2025-05-18

## 2025-05-18 RX ORDER — NYSTATIN 100000 [USP'U]/G
POWDER TOPICAL
Qty: 1 EACH | Refills: 0 | Status: SHIPPED | OUTPATIENT
Start: 2025-05-18 | End: 2025-05-18

## 2025-05-18 RX ORDER — BENZONATATE 100 MG/1
100 CAPSULE ORAL 3 TIMES DAILY PRN
Qty: 30 CAPSULE | Refills: 0 | Status: SHIPPED | OUTPATIENT
Start: 2025-05-18 | End: 2025-05-18

## 2025-05-18 RX ORDER — BENZONATATE 100 MG/1
100 CAPSULE ORAL 3 TIMES DAILY PRN
Qty: 30 CAPSULE | Refills: 0 | Status: SHIPPED | OUTPATIENT
Start: 2025-05-18 | End: 2025-05-28

## 2025-05-18 RX ORDER — AZITHROMYCIN 250 MG/1
250 TABLET, FILM COATED ORAL SEE ADMIN INSTRUCTIONS
Qty: 6 TABLET | Refills: 0 | Status: SHIPPED | OUTPATIENT
Start: 2025-05-18 | End: 2025-05-23

## 2025-05-18 RX ORDER — ALBUTEROL SULFATE 90 UG/1
2 INHALANT RESPIRATORY (INHALATION) EVERY 6 HOURS PRN
Qty: 18 G | Refills: 0 | Status: SHIPPED | OUTPATIENT
Start: 2025-05-18 | End: 2025-05-18

## 2025-06-06 ENCOUNTER — LAB (OUTPATIENT)
Age: 70
End: 2025-06-06

## 2025-06-06 ENCOUNTER — OFFICE VISIT (OUTPATIENT)
Age: 70
End: 2025-06-06
Payer: MEDICARE

## 2025-06-06 VITALS
OXYGEN SATURATION: 98 % | RESPIRATION RATE: 17 BRPM | BODY MASS INDEX: 42.59 KG/M2 | SYSTOLIC BLOOD PRESSURE: 136 MMHG | DIASTOLIC BLOOD PRESSURE: 86 MMHG | HEART RATE: 65 BPM | TEMPERATURE: 97.1 F | HEIGHT: 66 IN | WEIGHT: 265 LBS

## 2025-06-06 DIAGNOSIS — Z13.220 ENCOUNTER FOR LIPID SCREENING FOR CARDIOVASCULAR DISEASE: ICD-10-CM

## 2025-06-06 DIAGNOSIS — R73.03 PREDIABETES: ICD-10-CM

## 2025-06-06 DIAGNOSIS — R06.02 SHORTNESS OF BREATH: ICD-10-CM

## 2025-06-06 DIAGNOSIS — J18.9 PNEUMONIA OF RIGHT LUNG DUE TO INFECTIOUS ORGANISM, UNSPECIFIED PART OF LUNG: ICD-10-CM

## 2025-06-06 DIAGNOSIS — E03.9 ACQUIRED HYPOTHYROIDISM: ICD-10-CM

## 2025-06-06 DIAGNOSIS — Z13.6 ENCOUNTER FOR LIPID SCREENING FOR CARDIOVASCULAR DISEASE: ICD-10-CM

## 2025-06-06 DIAGNOSIS — Z23 ENCOUNTER FOR IMMUNIZATION: ICD-10-CM

## 2025-06-06 DIAGNOSIS — E03.9 ACQUIRED HYPOTHYROIDISM: Primary | ICD-10-CM

## 2025-06-06 DIAGNOSIS — F33.1 MAJOR DEPRESSIVE DISORDER, RECURRENT, MODERATE (HCC): ICD-10-CM

## 2025-06-06 PROCEDURE — 3075F SYST BP GE 130 - 139MM HG: CPT | Performed by: FAMILY MEDICINE

## 2025-06-06 PROCEDURE — 3079F DIAST BP 80-89 MM HG: CPT | Performed by: FAMILY MEDICINE

## 2025-06-06 PROCEDURE — 99214 OFFICE O/P EST MOD 30 MIN: CPT | Performed by: FAMILY MEDICINE

## 2025-06-06 PROCEDURE — 1123F ACP DISCUSS/DSCN MKR DOCD: CPT | Performed by: FAMILY MEDICINE

## 2025-06-06 PROCEDURE — 90677 PCV20 VACCINE IM: CPT | Performed by: FAMILY MEDICINE

## 2025-06-06 PROCEDURE — 1159F MED LIST DOCD IN RCRD: CPT | Performed by: FAMILY MEDICINE

## 2025-06-06 PROCEDURE — PBSHW PNEUMOCOCCAL, PCV20, PREVNAR 20, (AGE 6W+), IM, PF: Performed by: FAMILY MEDICINE

## 2025-06-06 RX ORDER — FLUTICASONE PROPIONATE AND SALMETEROL 250; 50 UG/1; UG/1
1 POWDER RESPIRATORY (INHALATION) EVERY 12 HOURS
Qty: 60 EACH | Refills: 3 | Status: SHIPPED | OUTPATIENT
Start: 2025-06-06

## 2025-06-06 NOTE — PROGRESS NOTES
noticed. Or the opposite - being so fidgety or restless that you have been moving around a lot more than usual 0 0 0 1 0  0   Thoughts that you would be better off dead, or of hurting yourself in some way 1 1 1       PHQ-9 Total Score 10  13 12 18 10 0 4   If you checked off any problems, how difficult have these problems made it for you to do your work, take care of things at home, or get along with other people? 0 0 0           Patient-reported        Fall Risk Assessment:  :         2/4/2025     8:58 AM 2/14/2024    11:14 AM 2/14/2024    11:05 AM 2/14/2024    11:04 AM   Fall Risk   Do you feel unsteady or are you worried about falling?  no yes yes no   2 or more falls in past year? no no no no   Fall with injury in past year? yes no no no        Abuse Screening:  :          No data to display                 Coordination of Care Questionnaire:  :     \"Have you been to the ER, urgent care clinic since your last visit?  Hospitalized since your last visit?\"    YES - When: approximately 3  weeks ago.  Where and Why: [facility] pneumonia .    “Have you seen or consulted any other health care providers outside our system since your last visit?”    NO    Have you had a mammogram?”   NO    Date of last Mammogram: 5/7/2021       “Have you had a diabetic eye exam?”    NO     Date of last diabetic eye exam: 2/16/2021       Click Here for Release of Records Request      HARINI TAYLOR MA

## 2025-06-09 RX ORDER — ESCITALOPRAM OXALATE 10 MG/1
10 TABLET ORAL DAILY
Qty: 90 TABLET | Refills: 0 | Status: SHIPPED | OUTPATIENT
Start: 2025-06-09

## 2025-06-09 RX ORDER — DOXEPIN HYDROCHLORIDE 10 MG/1
10 CAPSULE ORAL NIGHTLY
Qty: 120 CAPSULE | Refills: 3 | Status: SHIPPED | OUTPATIENT
Start: 2025-06-09

## 2025-06-09 NOTE — PROGRESS NOTES
Mucous membranes are moist.      Pharynx: Oropharynx is clear.   Eyes:      Extraocular Movements: Extraocular movements intact.      Conjunctiva/sclera: Conjunctivae normal.      Pupils: Pupils are equal, round, and reactive to light.   Cardiovascular:      Rate and Rhythm: Normal rate and regular rhythm.      Pulses: Normal pulses.      Heart sounds: Normal heart sounds.   Pulmonary:      Effort: Pulmonary effort is normal.      Breath sounds: Normal breath sounds.   Abdominal:      General: Abdomen is flat. Bowel sounds are normal.   Musculoskeletal:         General: Normal range of motion.      Cervical back: Normal range of motion and neck supple.   Skin:     General: Skin is warm.      Capillary Refill: Capillary refill takes less than 2 seconds.   Neurological:      General: No focal deficit present.      Mental Status: She is alert and oriented to person, place, and time. Mental status is at baseline.   Psychiatric:         Mood and Affect: Mood normal.         Behavior: Behavior normal.         Thought Content: Thought content normal.         Judgment: Judgment normal.                  An electronic signature was used to authenticate this note.    --Scarlet Neves MD

## 2025-06-13 ENCOUNTER — TELEPHONE (OUTPATIENT)
Age: 70
End: 2025-06-13

## 2025-06-13 NOTE — TELEPHONE ENCOUNTER
Pt came in to schedule a lab appt. She stated that she was in last week 06/06/2025 and the lab was unable to complete her draw. She was told to come back another day.     There are no active lab orders in the system. The last labs listed are 02/04/2025.    Can new lab orders be put in?    LOV: 06/06/2025    Thanks!

## 2025-07-07 ENCOUNTER — OFFICE VISIT (OUTPATIENT)
Age: 70
End: 2025-07-07
Payer: MEDICARE

## 2025-07-07 DIAGNOSIS — E03.9 ACQUIRED HYPOTHYROIDISM: Primary | ICD-10-CM

## 2025-07-07 DIAGNOSIS — E23.0 ADULT GROWTH HORMONE DEFICIENCY: ICD-10-CM

## 2025-07-07 PROCEDURE — G2211 COMPLEX E/M VISIT ADD ON: HCPCS | Performed by: INTERNAL MEDICINE

## 2025-07-07 PROCEDURE — 99214 OFFICE O/P EST MOD 30 MIN: CPT | Performed by: INTERNAL MEDICINE

## 2025-07-07 PROCEDURE — 1123F ACP DISCUSS/DSCN MKR DOCD: CPT | Performed by: INTERNAL MEDICINE

## 2025-07-07 NOTE — PROGRESS NOTES
Chief Complaint   Patient presents with    Thyroid Problem       * Since Last Visit: 8/17/2023  OVER-DUE almost 2 years!!    Lowered to 175mcg 9/2023 due to over-treatment  Filled by PCP since has not followed up with me   More recently TSH was slightly low again      in early alzheimer - does not get out much   Younger son is helping her more so able to get out     On brand, takes correctly, no missed doses     General:  From initial visit 3/18/22  Recent TSH is high   Is dealing with a liver condition - can be forgetful and confused - but does not think missed enough to cause the high TSH  Maybe only missed one once in awhile   Wt based is 206, is on 175mcg  Takes in AM with water, waits to eat 30 min or more  No Fe, Ca or MTV   No new meds  Did have choly 7/21  -  Was on nordotropin - got $3000 from them but only helped the 1st Rx of the year   Co-pay was $300 for 30 days after that   Has been off HGH since 7/21  Went on medicare 8/21     Thyroid Symptoms  **has decreased energy**, **has weight loss**, denies change in appetite, denies sleep issues, denies hair changes, no skin changes, denies sweats, denies hot/cold intolerance, denies tremor, **has palpitations**, denies change in bowels, is menopausal, denies mood changes    Neck Symptoms  denies dysphagia, denies anterior neck pain, denies neck swelling, notes no nodules    Labs/Studies    Lab Results   Component Value Date/Time    TSH 0.15 02/04/2025 10:49 AM    T4FREE 1.7 02/14/2024 11:56 AM       Lab Results   Component Value Date/Time    TSH 0.15 02/04/2025 10:49 AM    TSH 0.98 02/14/2024 11:56 AM    TSH 0.054 08/17/2023 08:47 AM    TSH 0.015 06/20/2022 04:47 PM    TSH 45.70 03/10/2022 09:58 AM    TSH 0.052 06/05/2020 08:47 AM    TSH 32.340 01/08/2020 12:00 AM      Past Medical History:   Diagnosis Date    Depression     Diabetes (HCC)     Gastrointestinal disorder     gerd    GERD (gastroesophageal reflux disease)     High cholesterol     Liver

## 2025-07-08 ENCOUNTER — RESULTS FOLLOW-UP (OUTPATIENT)
Age: 70
End: 2025-07-08

## 2025-07-08 DIAGNOSIS — E03.9 ACQUIRED HYPOTHYROIDISM: Primary | ICD-10-CM

## 2025-07-08 LAB
T4 FREE SERPL-MCNC: 1.84 NG/DL (ref 0.82–1.77)
TSH SERPL DL<=0.005 MIU/L-ACNC: 0.03 UIU/ML (ref 0.45–4.5)

## 2025-07-08 RX ORDER — LEVOTHYROXINE SODIUM 150 UG/1
TABLET ORAL
Qty: 90 TABLET | Refills: 1 | Status: SHIPPED | OUTPATIENT
Start: 2025-07-08

## 2025-08-13 ENCOUNTER — OFFICE VISIT (OUTPATIENT)
Age: 70
End: 2025-08-13
Payer: MEDICARE

## 2025-08-13 VITALS
WEIGHT: 257.4 LBS | HEIGHT: 66 IN | OXYGEN SATURATION: 97 % | BODY MASS INDEX: 41.37 KG/M2 | RESPIRATION RATE: 20 BRPM | DIASTOLIC BLOOD PRESSURE: 62 MMHG | HEART RATE: 89 BPM | TEMPERATURE: 97.6 F | SYSTOLIC BLOOD PRESSURE: 152 MMHG

## 2025-08-13 DIAGNOSIS — E66.813 OBESITY, CLASS 3 (HCC): ICD-10-CM

## 2025-08-13 DIAGNOSIS — E11.9 TYPE 2 DIABETES MELLITUS WITHOUT COMPLICATION, WITHOUT LONG-TERM CURRENT USE OF INSULIN (HCC): ICD-10-CM

## 2025-08-13 DIAGNOSIS — U07.1 COVID: Primary | ICD-10-CM

## 2025-08-13 PROCEDURE — 1123F ACP DISCUSS/DSCN MKR DOCD: CPT | Performed by: FAMILY MEDICINE

## 2025-08-13 PROCEDURE — 1126F AMNT PAIN NOTED NONE PRSNT: CPT | Performed by: FAMILY MEDICINE

## 2025-08-13 PROCEDURE — 99213 OFFICE O/P EST LOW 20 MIN: CPT | Performed by: FAMILY MEDICINE

## 2025-08-13 PROCEDURE — 3077F SYST BP >= 140 MM HG: CPT | Performed by: FAMILY MEDICINE

## 2025-08-13 PROCEDURE — 3078F DIAST BP <80 MM HG: CPT | Performed by: FAMILY MEDICINE

## 2025-08-13 PROCEDURE — 1159F MED LIST DOCD IN RCRD: CPT | Performed by: FAMILY MEDICINE

## 2025-08-13 RX ORDER — AZITHROMYCIN 250 MG/1
TABLET, FILM COATED ORAL
Qty: 6 TABLET | Refills: 0 | Status: SHIPPED | OUTPATIENT
Start: 2025-08-13 | End: 2025-08-23

## 2025-08-27 ENCOUNTER — COMMUNITY OUTREACH (OUTPATIENT)
Age: 70
End: 2025-08-27

## (undated) DEVICE — Device

## (undated) DEVICE — TRAY BX SFT TISS W/ RUL ALC PREP PD FEN DRP TWL LUERLOCK

## (undated) DEVICE — CATH IV AUTOGRD BC PNK 20GA 25 -- INSYTE

## (undated) DEVICE — 3M™ CUROS™ DISINFECTING CAP FOR NEEDLELESS CONNECTORS 270/CARTON 20 CARTONS/CASE CFF1-270: Brand: CUROS™

## (undated) DEVICE — SENSOR RMFG PLSE OXMTR INF --

## (undated) DEVICE — CONTAINER SPEC 20 ML LID NEUT BUFF FORMALIN 10 % POLYPR STS

## (undated) DEVICE — SNARE ENDOSCP M L240CM W27MM SHTH DIA2.4MM CHN 2.8MM OVL

## (undated) DEVICE — BASIN EMSIS 16OZ GRAPHITE PLAS KID SHP MOLD GRAD FOR ORAL

## (undated) DEVICE — SET ADMIN 16ML TBNG L100IN 2 Y INJ SITE IV PIGGY BK DISP

## (undated) DEVICE — CUFF RMFG BP INF SZ 11L DISP --

## (undated) DEVICE — FORCEPS BX L240CM JAW DIA2.8MM L CAP W/ NDL MIC MESH TOOTH

## (undated) DEVICE — SOLIDIFIER MEDC 1200ML -- CONVERT TO 356117

## (undated) DEVICE — POLYP TRAP: Brand: TRAPEASE®

## (undated) DEVICE — BITEBLOCK ENDOSCP 60FR MAXI WHT POLYETH STURDY W/ VELC WVN

## (undated) DEVICE — BAG BELONG PT PERS CLEAR HANDL

## (undated) DEVICE — ELECTRODE,RADIOTRANSLUCENT,FOAM,3PK: Brand: MEDLINE

## (undated) DEVICE — MAX-CORE® DISPOSABLE CORE BIOPSY INSTRUMENT, 16G X 16CM: Brand: MAX-CORE

## (undated) DEVICE — CANNULA CUSH AD W/ 14FT TBG

## (undated) DEVICE — KIT COLON W/ 1.1OZ LUB AND 2 END

## (undated) DEVICE — BAG SPEC BIOHZRD 10 X 10 IN --

## (undated) DEVICE — CANN NASAL O2 CAPNOGRAPHY AD -- FILTERLINE

## (undated) DEVICE — 1200 GUARD II KIT W/5MM TUBE W/O VAC TUBE: Brand: GUARDIAN